# Patient Record
Sex: MALE | Race: WHITE | NOT HISPANIC OR LATINO | Employment: OTHER | URBAN - METROPOLITAN AREA
[De-identification: names, ages, dates, MRNs, and addresses within clinical notes are randomized per-mention and may not be internally consistent; named-entity substitution may affect disease eponyms.]

---

## 2017-10-30 ENCOUNTER — OFFICE VISIT (OUTPATIENT)
Dept: FAMILY MEDICINE CLINIC | Facility: CLINIC | Age: 77
End: 2017-10-30

## 2017-10-30 VITALS
RESPIRATION RATE: 16 BRPM | WEIGHT: 205 LBS | OXYGEN SATURATION: 98 % | TEMPERATURE: 98 F | HEIGHT: 72 IN | SYSTOLIC BLOOD PRESSURE: 162 MMHG | DIASTOLIC BLOOD PRESSURE: 76 MMHG | HEART RATE: 91 BPM | BODY MASS INDEX: 27.77 KG/M2

## 2017-10-30 DIAGNOSIS — E78.00 HYPERCHOLESTEROLEMIA: Primary | ICD-10-CM

## 2017-10-30 DIAGNOSIS — I10 ESSENTIAL HYPERTENSION: ICD-10-CM

## 2017-10-30 PROCEDURE — 99203 OFFICE O/P NEW LOW 30 MIN: CPT | Performed by: INTERNAL MEDICINE

## 2017-10-30 RX ORDER — POTASSIUM CHLORIDE 750 MG/1
10 TABLET, FILM COATED, EXTENDED RELEASE ORAL
Refills: 0 | COMMUNITY
Start: 2017-08-25 | End: 2017-10-30 | Stop reason: SDUPTHER

## 2017-10-30 RX ORDER — HYDROCHLOROTHIAZIDE 25 MG/1
25 TABLET ORAL 2 TIMES DAILY
Qty: 180 TABLET | Refills: 1 | Status: SHIPPED | OUTPATIENT
Start: 2017-10-30 | End: 2018-04-30 | Stop reason: SDUPTHER

## 2017-10-30 RX ORDER — METOPROLOL SUCCINATE 25 MG/1
25 TABLET, EXTENDED RELEASE ORAL DAILY
Refills: 0 | COMMUNITY
Start: 2017-07-31 | End: 2017-10-30 | Stop reason: SDUPTHER

## 2017-10-30 RX ORDER — ALLOPURINOL 100 MG/1
100 TABLET ORAL DAILY
Refills: 0 | COMMUNITY
Start: 2017-09-13 | End: 2017-10-30 | Stop reason: SDUPTHER

## 2017-10-30 RX ORDER — HYDROCHLOROTHIAZIDE 25 MG/1
25 TABLET ORAL 2 TIMES DAILY
Refills: 0 | COMMUNITY
Start: 2017-08-07 | End: 2017-10-30 | Stop reason: SDUPTHER

## 2017-10-30 RX ORDER — RAMIPRIL 5 MG/1
5 CAPSULE ORAL 2 TIMES DAILY
Refills: 0 | COMMUNITY
Start: 2017-09-13 | End: 2017-10-30 | Stop reason: SDUPTHER

## 2017-10-30 RX ORDER — ATORVASTATIN CALCIUM 10 MG/1
10 TABLET, FILM COATED ORAL DAILY
Qty: 90 TABLET | Refills: 1 | Status: SHIPPED | OUTPATIENT
Start: 2017-10-30 | End: 2018-05-29 | Stop reason: SDUPTHER

## 2017-10-30 RX ORDER — ATORVASTATIN CALCIUM 10 MG/1
10 TABLET, FILM COATED ORAL DAILY
Refills: 0 | COMMUNITY
Start: 2017-09-01 | End: 2017-10-30 | Stop reason: SDUPTHER

## 2017-10-30 RX ORDER — POTASSIUM CHLORIDE 750 MG/1
10 TABLET, FILM COATED, EXTENDED RELEASE ORAL 2 TIMES DAILY
Qty: 180 TABLET | Refills: 1 | Status: SHIPPED | OUTPATIENT
Start: 2017-10-30 | End: 2017-11-01 | Stop reason: SDUPTHER

## 2017-10-30 RX ORDER — METOPROLOL SUCCINATE 25 MG/1
25 TABLET, EXTENDED RELEASE ORAL DAILY
Qty: 90 TABLET | Refills: 1 | Status: SHIPPED | OUTPATIENT
Start: 2017-10-30 | End: 2018-04-24 | Stop reason: SDUPTHER

## 2017-10-30 RX ORDER — RAMIPRIL 5 MG/1
5 CAPSULE ORAL 2 TIMES DAILY
Qty: 180 CAPSULE | Refills: 1 | Status: SHIPPED | OUTPATIENT
Start: 2017-10-30 | End: 2018-07-03 | Stop reason: SDUPTHER

## 2017-10-30 RX ORDER — ALLOPURINOL 100 MG/1
100 TABLET ORAL DAILY
Qty: 90 TABLET | Refills: 1 | Status: SHIPPED | OUTPATIENT
Start: 2017-10-30 | End: 2018-07-03 | Stop reason: SDUPTHER

## 2017-10-30 RX ORDER — HYDRALAZINE HYDROCHLORIDE 25 MG/1
25 TABLET, FILM COATED ORAL DAILY
Qty: 180 TABLET | Refills: 0 | Status: SHIPPED | OUTPATIENT
Start: 2017-10-30 | End: 2018-02-06 | Stop reason: SDUPTHER

## 2017-10-30 RX ORDER — HYDRALAZINE HYDROCHLORIDE 25 MG/1
25 TABLET, FILM COATED ORAL DAILY
Refills: 0 | COMMUNITY
Start: 2017-09-06 | End: 2017-10-30 | Stop reason: SDUPTHER

## 2017-10-30 NOTE — PROGRESS NOTES
Subjective   Olu Izquierdo is a 77 y.o. male. Patient is here today for   Chief Complaint   Patient presents with   • Med Refill     patient needs refill on Metoprolol and HCTZ          Vitals:    10/30/17 1323   BP: 162/76   Pulse: 91   Resp: 16   Temp: 98 °F (36.7 °C)   SpO2: 98%       Past Medical History:   Diagnosis Date   • Arthritis    • Depression    • Hyperlipidemia    • Hypertension       No Known Allergies   Social History     Social History   • Marital status:      Spouse name: N/A   • Number of children: N/A   • Years of education: N/A     Occupational History   • Not on file.     Social History Main Topics   • Smoking status: Never Smoker   • Smokeless tobacco: Not on file   • Alcohol use Yes   • Drug use: Not on file   • Sexual activity: Not on file     Other Topics Concern   • Not on file     Social History Narrative   • No narrative on file        Current Outpatient Prescriptions:   •  allopurinol (ZYLOPRIM) 100 MG tablet, Take 1 tablet by mouth Daily., Disp: 90 tablet, Rfl: 1  •  atorvastatin (LIPITOR) 10 MG tablet, Take 1 tablet by mouth Daily., Disp: 90 tablet, Rfl: 1  •  hydrALAZINE (APRESOLINE) 25 MG tablet, Take 1 tablet by mouth Daily., Disp: 180 tablet, Rfl: 0  •  hydrochlorothiazide (HYDRODIURIL) 25 MG tablet, Take 1 tablet by mouth 2 (Two) Times a Day., Disp: 180 tablet, Rfl: 1  •  metoprolol succinate XL (TOPROL-XL) 25 MG 24 hr tablet, Take 1 tablet by mouth Daily., Disp: 90 tablet, Rfl: 1  •  potassium chloride (K-DUR) 10 MEQ CR tablet, Take 1 tablet by mouth 2 (Two) Times a Day. TAKE 3 TABLETS TWICE DAILY, Disp: 180 tablet, Rfl: 1  •  ramipril (ALTACE) 5 MG capsule, Take 1 capsule by mouth 2 (Two) Times a Day., Disp: 180 capsule, Rfl: 1     Objective     HPI Comments: He is here meet me today for the first time.  He saw Dr. Akins for many years until his recent death.       Review of Systems   Constitutional: Negative.    HENT: Negative.    Respiratory: Negative.     Cardiovascular: Negative.    Musculoskeletal: Negative.    Psychiatric/Behavioral: Negative.        Physical Exam   Constitutional: He is oriented to person, place, and time. He appears well-developed and well-nourished.   HENT:   Head: Normocephalic and atraumatic.   Cardiovascular: Normal rate and regular rhythm.    Pulmonary/Chest: Effort normal and breath sounds normal.   Neurological: He is alert and oriented to person, place, and time.   Psychiatric: He has a normal mood and affect. His behavior is normal.   Nursing note and vitals reviewed.        Problem List Items Addressed This Visit        Cardiovascular and Mediastinum    Essential hypertension    Relevant Medications    hydrALAZINE (APRESOLINE) 25 MG tablet    hydrochlorothiazide (HYDRODIURIL) 25 MG tablet    metoprolol succinate XL (TOPROL-XL) 25 MG 24 hr tablet    ramipril (ALTACE) 5 MG capsule    Hypercholesterolemia - Primary    Relevant Medications    atorvastatin (LIPITOR) 10 MG tablet            PLAN  His hypertension is well-controlled.    He is a lipid and comprehensive metabolic panel which we will get today.    Follow-up in 3 months or so.  About a week before that visit, I would like to get following labs: Lipid profile, comprehensive metabolic panel, CBC, urinalysis.  Return in about 3 months (around 1/30/2018) for with labs.

## 2017-11-01 RX ORDER — POTASSIUM CHLORIDE 750 MG/1
TABLET, FILM COATED, EXTENDED RELEASE ORAL
Qty: 540 TABLET | Refills: 1 | Status: SHIPPED | OUTPATIENT
Start: 2017-11-01 | End: 2018-05-29 | Stop reason: SDUPTHER

## 2017-12-27 ENCOUNTER — OFFICE VISIT (OUTPATIENT)
Dept: FAMILY MEDICINE CLINIC | Facility: CLINIC | Age: 77
End: 2017-12-27

## 2017-12-27 VITALS
DIASTOLIC BLOOD PRESSURE: 77 MMHG | HEART RATE: 91 BPM | HEIGHT: 72 IN | RESPIRATION RATE: 16 BRPM | WEIGHT: 207.2 LBS | SYSTOLIC BLOOD PRESSURE: 148 MMHG | BODY MASS INDEX: 28.06 KG/M2 | TEMPERATURE: 98.4 F | OXYGEN SATURATION: 98 %

## 2017-12-27 DIAGNOSIS — J06.9 ACUTE URI: Primary | ICD-10-CM

## 2017-12-27 PROCEDURE — 99213 OFFICE O/P EST LOW 20 MIN: CPT | Performed by: NURSE PRACTITIONER

## 2017-12-27 RX ORDER — PROMETHAZINE HYDROCHLORIDE AND CODEINE PHOSPHATE 6.25; 1 MG/5ML; MG/5ML
5 SYRUP ORAL EVERY 4 HOURS PRN
Qty: 180 ML | Refills: 0 | Status: SHIPPED | OUTPATIENT
Start: 2017-12-27 | End: 2018-02-06

## 2017-12-27 NOTE — PROGRESS NOTES
Subjective   Olu Izquierdo is a 77 y.o. male.   Chief Complaint   Patient presents with   • Cough     pt states has been going on 12/18/17   • Hoarse   • Nasal Congestion     Vitals:    12/27/17 1347   BP: 148/77   Pulse: 91   Resp: 16   Temp: 98.4 °F (36.9 °C)   SpO2: 98%     No LMP for male patient.    History of Present Illness  Olu is here for an acute visit. He c/o sore throat, cough, and nasal congestion for 3-4 days. He denies fever, chills or body aches. He did get his flu vaccine.     The following portions of the patient's history were reviewed and updated as appropriate: allergies, current medications, past family history, past medical history, past social history, past surgical history and problem list.    Review of Systems   Constitutional: Negative for chills, fatigue and fever.   HENT: Positive for congestion, postnasal drip, rhinorrhea and sore throat (mild ). Negative for ear pain, sinus pain and sinus pressure.    Respiratory: Positive for cough. Negative for shortness of breath and wheezing.    Cardiovascular: Negative.        Objective   Physical Exam   Constitutional: Vital signs are normal. He appears well-developed and well-nourished. No distress.   HENT:   Right Ear: Tympanic membrane and ear canal normal.   Left Ear: Tympanic membrane and ear canal normal.   Nose: Mucosal edema and rhinorrhea present.   Mouth/Throat: Uvula is midline. Posterior oropharyngeal erythema present.   Cardiovascular: Normal rate and regular rhythm.    Pulmonary/Chest: Effort normal and breath sounds normal.   Neurological: He is alert.       Assessment/Plan   Olu was seen today for cough, hoarse and nasal congestion.    Diagnoses and all orders for this visit:    Acute URI    Other orders  -     promethazine-codeine (PHENERGAN with CODEINE) 6.25-10 MG/5ML syrup; Take 5 mL by mouth Every 4 (Four) Hours As Needed for Cough.      claritin or flonase otc for post nasal drainage  Rest and fluids  Tylenol or  motrin  If no improvement with symptom treatment for 7-10 days, advised to call and will call in abx  If there are new symptoms or he is worse he needs to follow up in the office

## 2018-01-08 ENCOUNTER — OFFICE VISIT (OUTPATIENT)
Dept: FAMILY MEDICINE CLINIC | Facility: CLINIC | Age: 78
End: 2018-01-08

## 2018-01-08 VITALS
BODY MASS INDEX: 28.06 KG/M2 | HEART RATE: 91 BPM | TEMPERATURE: 98 F | HEIGHT: 72 IN | DIASTOLIC BLOOD PRESSURE: 64 MMHG | WEIGHT: 207.2 LBS | OXYGEN SATURATION: 98 % | SYSTOLIC BLOOD PRESSURE: 118 MMHG

## 2018-01-08 DIAGNOSIS — N41.0 ACUTE PROSTATITIS: Primary | ICD-10-CM

## 2018-01-08 PROCEDURE — 99213 OFFICE O/P EST LOW 20 MIN: CPT | Performed by: INTERNAL MEDICINE

## 2018-01-08 RX ORDER — AMOXICILLIN AND CLAVULANATE POTASSIUM 875; 125 MG/1; MG/1
TABLET, FILM COATED ORAL
Refills: 0 | COMMUNITY
Start: 2017-12-29 | End: 2018-02-06

## 2018-01-08 NOTE — PROGRESS NOTES
Subjective   Olu Izquierdo is a 77 y.o. male. Patient is here today for   Chief Complaint   Patient presents with   • Benign Prostatic Hypertrophy     went to urgent care on 1/6/18          Vitals:    01/08/18 1019   BP: 118/64   Pulse: 91   Temp: 98 °F (36.7 °C)   SpO2: 98%       Past Medical History:   Diagnosis Date   • Arthritis    • Depression    • Hyperlipidemia    • Hypertension       No Known Allergies   Social History     Social History   • Marital status:      Spouse name: N/A   • Number of children: N/A   • Years of education: N/A     Occupational History   • Not on file.     Social History Main Topics   • Smoking status: Never Smoker   • Smokeless tobacco: Never Used   • Alcohol use Yes   • Drug use: Not on file   • Sexual activity: Not on file     Other Topics Concern   • Not on file     Social History Narrative        Current Outpatient Prescriptions:   •  allopurinol (ZYLOPRIM) 100 MG tablet, Take 1 tablet by mouth Daily., Disp: 90 tablet, Rfl: 1  •  amoxicillin-clavulanate (AUGMENTIN) 875-125 MG per tablet, TK 1 T PO BID FOR 10 DAYS, Disp: , Rfl: 0  •  atorvastatin (LIPITOR) 10 MG tablet, Take 1 tablet by mouth Daily., Disp: 90 tablet, Rfl: 1  •  hydrALAZINE (APRESOLINE) 25 MG tablet, Take 1 tablet by mouth Daily., Disp: 180 tablet, Rfl: 0  •  hydrochlorothiazide (HYDRODIURIL) 25 MG tablet, Take 1 tablet by mouth 2 (Two) Times a Day., Disp: 180 tablet, Rfl: 1  •  metoprolol succinate XL (TOPROL-XL) 25 MG 24 hr tablet, Take 1 tablet by mouth Daily., Disp: 90 tablet, Rfl: 1  •  potassium chloride (K-DUR) 10 MEQ CR tablet, TAKE 3 TABLETS BY MOUTH TWICE DAILY, Disp: 540 tablet, Rfl: 1  •  promethazine-codeine (PHENERGAN with CODEINE) 6.25-10 MG/5ML syrup, Take 5 mL by mouth Every 4 (Four) Hours As Needed for Cough., Disp: 180 mL, Rfl: 0  •  ramipril (ALTACE) 5 MG capsule, Take 1 capsule by mouth 2 (Two) Times a Day., Disp: 180 capsule, Rfl: 1  •  sulfamethoxazole-trimethoprim (BACTRIM DS,SEPTRA DS)  800-160 MG per tablet, Take 1 tablet by mouth 2 (Two) Times a Day., Disp: 28 tablet, Rfl: 0  •  tamsulosin (FLOMAX) 0.4 MG capsule 24 hr capsule, Take 1 capsule by mouth Every Night., Disp: 30 capsule, Rfl: 0     Objective     HPI Comments: He went to the urgent treatment Center 2 days ago.  He is found to have a tender prostate.  He was treated for prostatitis with tamsulosin 0.4 mg and Bactrim DS.  He said he felt better almost immediately after starting the antibiotic.    He is following up today as he was asked to by the urgent treatment physician.    He tells me his symptoms of urinary obstruction have resolved.  She was having some burning in the tip of his penis at the end of micturition but this has gone away as well.    Benign Prostatic Hypertrophy          Review of Systems   Constitutional: Negative.    Genitourinary: Negative.        Physical Exam   Constitutional: He is oriented to person, place, and time. He appears well-developed and well-nourished.   HENT:   Head: Normocephalic and atraumatic.   Pulmonary/Chest: Effort normal.   Neurological: He is alert and oriented to person, place, and time.   Psychiatric: He has a normal mood and affect. His behavior is normal.   Nursing note and vitals reviewed.        Problem List Items Addressed This Visit        Genitourinary    Acute prostatitis - Primary            PLAN  He appears to be getting better with the appropriate antibiotic selection of the urgent treatment physician.  He will continue a total of 14 days Bactrim 1 by mouth twice a day.    He will continue tamsulosin 0.4 mg.    He will follow-up as previously arranged.  No Follow-up on file.

## 2018-01-30 DIAGNOSIS — Z79.899 LONG TERM USE OF DRUG: Primary | ICD-10-CM

## 2018-01-30 DIAGNOSIS — E78.5 HYPERLIPIDEMIA, UNSPECIFIED HYPERLIPIDEMIA TYPE: ICD-10-CM

## 2018-01-30 DIAGNOSIS — Z12.5 ENCOUNTER FOR SCREENING FOR MALIGNANT NEOPLASM OF PROSTATE: ICD-10-CM

## 2018-01-30 LAB
ALBUMIN SERPL-MCNC: 4.3 G/DL (ref 3.5–5.2)
ALBUMIN/GLOB SERPL: 1.7 G/DL
ALP SERPL-CCNC: 75 U/L (ref 39–117)
ALT SERPL-CCNC: 31 U/L (ref 1–41)
AST SERPL-CCNC: 24 U/L (ref 1–40)
BILIRUB SERPL-MCNC: 0.5 MG/DL (ref 0.1–1.2)
BUN SERPL-MCNC: 19 MG/DL (ref 8–23)
BUN/CREAT SERPL: 16.4 (ref 7–25)
CALCIUM SERPL-MCNC: 9.1 MG/DL (ref 8.6–10.5)
CHLORIDE SERPL-SCNC: 100 MMOL/L (ref 98–107)
CHOLEST SERPL-MCNC: 151 MG/DL (ref 0–200)
CO2 SERPL-SCNC: 32.6 MMOL/L (ref 22–29)
CREAT SERPL-MCNC: 1.16 MG/DL (ref 0.76–1.27)
GFR SERPLBLD CREATININE-BSD FMLA CKD-EPI: 61 ML/MIN/1.73
GFR SERPLBLD CREATININE-BSD FMLA CKD-EPI: 74 ML/MIN/1.73
GLOBULIN SER CALC-MCNC: 2.5 GM/DL
GLUCOSE SERPL-MCNC: 104 MG/DL (ref 65–99)
HDLC SERPL-MCNC: 58 MG/DL (ref 40–60)
LDLC SERPL CALC-MCNC: 76 MG/DL (ref 0–100)
LDLC/HDLC SERPL: 1.31 {RATIO}
POTASSIUM SERPL-SCNC: 4 MMOL/L (ref 3.5–5.2)
PROT SERPL-MCNC: 6.8 G/DL (ref 6–8.5)
PSA SERPL-MCNC: 1.9 NG/ML (ref 0–4)
SODIUM SERPL-SCNC: 140 MMOL/L (ref 136–145)
TRIGL SERPL-MCNC: 86 MG/DL (ref 0–150)
VLDLC SERPL CALC-MCNC: 17.2 MG/DL (ref 5–40)

## 2018-02-06 ENCOUNTER — OFFICE VISIT (OUTPATIENT)
Dept: FAMILY MEDICINE CLINIC | Facility: CLINIC | Age: 78
End: 2018-02-06

## 2018-02-06 VITALS
OXYGEN SATURATION: 99 % | TEMPERATURE: 97.8 F | BODY MASS INDEX: 28.42 KG/M2 | DIASTOLIC BLOOD PRESSURE: 63 MMHG | WEIGHT: 209.8 LBS | SYSTOLIC BLOOD PRESSURE: 134 MMHG | RESPIRATION RATE: 16 BRPM | HEART RATE: 78 BPM | HEIGHT: 72 IN

## 2018-02-06 DIAGNOSIS — E78.00 HYPERCHOLESTEROLEMIA: Primary | ICD-10-CM

## 2018-02-06 DIAGNOSIS — I10 ESSENTIAL HYPERTENSION: ICD-10-CM

## 2018-02-06 PROCEDURE — 99213 OFFICE O/P EST LOW 20 MIN: CPT | Performed by: INTERNAL MEDICINE

## 2018-02-06 RX ORDER — HYDRALAZINE HYDROCHLORIDE 25 MG/1
25 TABLET, FILM COATED ORAL DAILY
Qty: 180 TABLET | Refills: 0 | Status: SHIPPED | OUTPATIENT
Start: 2018-02-06 | End: 2018-06-07 | Stop reason: SDUPTHER

## 2018-02-06 NOTE — PROGRESS NOTES
Subjective   Olu Izquierdo is a 77 y.o. male. Patient is here today for   Chief Complaint   Patient presents with   • Follow-up     hypertension          Vitals:    02/06/18 0857   BP: 134/63   Pulse: 78   Resp: 16   Temp: 97.8 °F (36.6 °C)   SpO2: 99%       Past Medical History:   Diagnosis Date   • Arthritis    • Depression    • Hyperlipidemia    • Hypertension       No Known Allergies   Social History     Social History   • Marital status:      Spouse name: N/A   • Number of children: N/A   • Years of education: N/A     Occupational History   • Not on file.     Social History Main Topics   • Smoking status: Never Smoker   • Smokeless tobacco: Never Used   • Alcohol use Yes   • Drug use: Not on file   • Sexual activity: Not on file     Other Topics Concern   • Not on file     Social History Narrative        Current Outpatient Prescriptions:   •  allopurinol (ZYLOPRIM) 100 MG tablet, Take 1 tablet by mouth Daily., Disp: 90 tablet, Rfl: 1  •  atorvastatin (LIPITOR) 10 MG tablet, Take 1 tablet by mouth Daily., Disp: 90 tablet, Rfl: 1  •  hydrALAZINE (APRESOLINE) 25 MG tablet, Take 1 tablet by mouth Daily., Disp: 180 tablet, Rfl: 0  •  hydrochlorothiazide (HYDRODIURIL) 25 MG tablet, Take 1 tablet by mouth 2 (Two) Times a Day., Disp: 180 tablet, Rfl: 1  •  metoprolol succinate XL (TOPROL-XL) 25 MG 24 hr tablet, Take 1 tablet by mouth Daily., Disp: 90 tablet, Rfl: 1  •  potassium chloride (K-DUR) 10 MEQ CR tablet, TAKE 3 TABLETS BY MOUTH TWICE DAILY, Disp: 540 tablet, Rfl: 1  •  ramipril (ALTACE) 5 MG capsule, Take 1 capsule by mouth 2 (Two) Times a Day., Disp: 180 capsule, Rfl: 1  •  tamsulosin (FLOMAX) 0.4 MG capsule 24 hr capsule, Take 1 capsule by mouth Every Night., Disp: 30 capsule, Rfl: 0     Objective     HPI Comments: He is here to follow-up on hypertension.    He tells me that he feels well.       Review of Systems   Constitutional: Negative.    HENT: Negative.    Respiratory: Negative.     Cardiovascular: Negative.    Psychiatric/Behavioral: Negative.        Physical Exam   Constitutional: He is oriented to person, place, and time.   Pleasant, neatly groomed, BMI 28.   HENT:   Head: Normocephalic and atraumatic.   Pulmonary/Chest: Effort normal.   Neurological: He is alert and oriented to person, place, and time.   Psychiatric: He has a normal mood and affect. His behavior is normal.   Nursing note and vitals reviewed.        Problem List Items Addressed This Visit        Cardiovascular and Mediastinum    Essential hypertension    Relevant Medications    hydrALAZINE (APRESOLINE) 25 MG tablet    Hypercholesterolemia - Primary            PLAN  His hypertension is well-controlled.    His hypercholesterolemia is well-controlled with an LDL of 76.    I asked him to follow-up in about 6 months to recheck a lipid and comprehensive metabolic panel.    He should follow-up for Medicare wellness visit once yearly.    I reminded him to get some vascular screening.  No Follow-up on file.

## 2018-04-24 RX ORDER — METOPROLOL SUCCINATE 25 MG/1
25 TABLET, EXTENDED RELEASE ORAL DAILY
Qty: 90 TABLET | Refills: 0 | Status: SHIPPED | OUTPATIENT
Start: 2018-04-24 | End: 2018-07-24 | Stop reason: SDUPTHER

## 2018-04-30 RX ORDER — HYDROCHLOROTHIAZIDE 25 MG/1
TABLET ORAL
Qty: 180 TABLET | Refills: 0 | Status: SHIPPED | OUTPATIENT
Start: 2018-04-30 | End: 2018-08-07 | Stop reason: SDUPTHER

## 2018-05-29 RX ORDER — ATORVASTATIN CALCIUM 10 MG/1
10 TABLET, FILM COATED ORAL DAILY
Qty: 90 TABLET | Refills: 0 | Status: SHIPPED | OUTPATIENT
Start: 2018-05-29 | End: 2018-08-22 | Stop reason: SDUPTHER

## 2018-05-29 RX ORDER — POTASSIUM CHLORIDE 750 MG/1
TABLET, FILM COATED, EXTENDED RELEASE ORAL
Qty: 540 TABLET | Refills: 0 | Status: SHIPPED | OUTPATIENT
Start: 2018-05-29 | End: 2018-08-22 | Stop reason: SDUPTHER

## 2018-06-07 RX ORDER — HYDRALAZINE HYDROCHLORIDE 25 MG/1
TABLET, FILM COATED ORAL
Qty: 180 TABLET | Refills: 0 | Status: SHIPPED | OUTPATIENT
Start: 2018-06-07 | End: 2018-08-22 | Stop reason: SDUPTHER

## 2018-06-14 ENCOUNTER — TELEPHONE (OUTPATIENT)
Dept: FAMILY MEDICINE CLINIC | Facility: CLINIC | Age: 78
End: 2018-06-14

## 2018-06-14 NOTE — TELEPHONE ENCOUNTER
Patient would like to become your patient. He sees Dr. Moore but would like pcp closer home.   Just let me know thanks.

## 2018-07-03 RX ORDER — ALLOPURINOL 100 MG/1
100 TABLET ORAL DAILY
Qty: 90 TABLET | Refills: 0 | Status: SHIPPED | OUTPATIENT
Start: 2018-07-03 | End: 2018-08-22

## 2018-07-03 RX ORDER — RAMIPRIL 5 MG/1
CAPSULE ORAL
Qty: 180 CAPSULE | Refills: 0 | Status: SHIPPED | OUTPATIENT
Start: 2018-07-03 | End: 2018-08-22 | Stop reason: SDUPTHER

## 2018-07-24 RX ORDER — METOPROLOL SUCCINATE 25 MG/1
25 TABLET, EXTENDED RELEASE ORAL DAILY
Qty: 90 TABLET | Refills: 0 | Status: SHIPPED | OUTPATIENT
Start: 2018-07-24 | End: 2018-08-22 | Stop reason: SDUPTHER

## 2018-08-07 RX ORDER — HYDROCHLOROTHIAZIDE 25 MG/1
TABLET ORAL
Qty: 180 TABLET | Refills: 0 | Status: SHIPPED | OUTPATIENT
Start: 2018-08-07 | End: 2018-08-22 | Stop reason: SDUPTHER

## 2018-08-22 ENCOUNTER — OFFICE VISIT (OUTPATIENT)
Dept: FAMILY MEDICINE CLINIC | Facility: CLINIC | Age: 78
End: 2018-08-22

## 2018-08-22 VITALS
DIASTOLIC BLOOD PRESSURE: 79 MMHG | HEIGHT: 72 IN | WEIGHT: 197.8 LBS | OXYGEN SATURATION: 99 % | SYSTOLIC BLOOD PRESSURE: 140 MMHG | BODY MASS INDEX: 26.79 KG/M2 | TEMPERATURE: 97 F | HEART RATE: 72 BPM

## 2018-08-22 DIAGNOSIS — I10 ESSENTIAL HYPERTENSION: Primary | ICD-10-CM

## 2018-08-22 DIAGNOSIS — E87.6 HYPOKALEMIA: ICD-10-CM

## 2018-08-22 DIAGNOSIS — E78.00 HYPERCHOLESTEROLEMIA: ICD-10-CM

## 2018-08-22 DIAGNOSIS — M10.9 GOUT, UNSPECIFIED CAUSE, UNSPECIFIED CHRONICITY, UNSPECIFIED SITE: ICD-10-CM

## 2018-08-22 PROBLEM — N41.0 ACUTE PROSTATITIS: Status: RESOLVED | Noted: 2018-01-08 | Resolved: 2018-08-22

## 2018-08-22 PROCEDURE — 99214 OFFICE O/P EST MOD 30 MIN: CPT | Performed by: FAMILY MEDICINE

## 2018-08-22 RX ORDER — POTASSIUM CHLORIDE 750 MG/1
30 TABLET, FILM COATED, EXTENDED RELEASE ORAL 2 TIMES DAILY
Qty: 540 TABLET | Refills: 1 | Status: SHIPPED | OUTPATIENT
Start: 2018-08-22 | End: 2019-02-25 | Stop reason: SDUPTHER

## 2018-08-22 RX ORDER — HYDRALAZINE HYDROCHLORIDE 25 MG/1
25 TABLET, FILM COATED ORAL 2 TIMES DAILY
Qty: 180 TABLET | Refills: 1 | Status: SHIPPED | OUTPATIENT
Start: 2018-08-22 | End: 2019-03-04 | Stop reason: SDUPTHER

## 2018-08-22 RX ORDER — ATORVASTATIN CALCIUM 10 MG/1
10 TABLET, FILM COATED ORAL DAILY
Qty: 90 TABLET | Refills: 1 | Status: SHIPPED | OUTPATIENT
Start: 2018-08-22 | End: 2019-02-21 | Stop reason: SDUPTHER

## 2018-08-22 RX ORDER — HYDROCHLOROTHIAZIDE 25 MG/1
25 TABLET ORAL 2 TIMES DAILY
Qty: 180 TABLET | Refills: 1 | Status: SHIPPED | OUTPATIENT
Start: 2018-08-22 | End: 2019-05-10 | Stop reason: SDUPTHER

## 2018-08-22 RX ORDER — ALLOPURINOL 100 MG/1
100 TABLET ORAL DAILY
Qty: 90 TABLET | Refills: 1 | Status: CANCELLED | OUTPATIENT
Start: 2018-08-22

## 2018-08-22 RX ORDER — METOPROLOL SUCCINATE 25 MG/1
25 TABLET, EXTENDED RELEASE ORAL DAILY
Qty: 90 TABLET | Refills: 0 | Status: SHIPPED | OUTPATIENT
Start: 2018-08-22 | End: 2019-01-21 | Stop reason: SDUPTHER

## 2018-08-22 RX ORDER — RAMIPRIL 5 MG/1
5 CAPSULE ORAL 2 TIMES DAILY
Qty: 180 CAPSULE | Refills: 1 | Status: SHIPPED | OUTPATIENT
Start: 2018-08-22 | End: 2019-04-02 | Stop reason: SDUPTHER

## 2018-08-22 NOTE — PROGRESS NOTES
"Aba Izquierdo is a 77 y.o. male.     Chief Complaint   Patient presents with   • Establish Care     Transfer Dr. Moore    • Hypertension   • Hyperlipidemia   • Arthritis        History of Present Illness    New patient.  Long-standing hypertension.  On a interesting combination of medication including hydralazine, hydrochlorothiazide 50 mg a day, metoprolol, Altace 5 mg twice a day, and a high dose of potassium chloride 60 mEq daily.  He states in the distant past, prior to the hydrochlorothiazide, he is to pass out with low potassium levels.  His blood pressures remained high at times.  He does not check at home.  He feels great otherwise.  No cardiovascular or neurological symptoms.  No rashes.  No synovitis.  Potassium only 4.0 a few months ago with normal creatinine.    Hyperlipidemia.  Continues on 10 mg of atorvastatin daily without complaint.  Lipid panel a few months ago normal.    History of questionable gout.  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  He's never had classic gout symptoms.  He's been on allopurinol 100 mg a day for about 20 years.      The following portions of the patient's history were reviewed and updated as appropriate: allergies, current medications, past family history, past medical history, past social history, past surgical history and problem list.          Review of Systems   Constitutional: Negative.    Respiratory: Negative.    Cardiovascular: Negative.    Musculoskeletal: Negative.    Neurological: Negative.    Psychiatric/Behavioral: Negative.        Objective   Blood pressure 140/79, pulse 72, temperature 97 °F (36.1 °C), temperature source Oral, height 182.9 cm (72.01\"), weight 89.7 kg (197 lb 12.8 oz), SpO2 99 %.  Physical Exam   Constitutional: He appears well-developed and well-nourished. No distress.   Neck: No thyromegaly present.   Cardiovascular: Normal rate, regular rhythm, normal heart sounds and intact distal pulses.    Pulmonary/Chest: " Effort normal and breath sounds normal.   Abdominal: Soft. Bowel sounds are normal. He exhibits no distension and no mass. There is no tenderness. There is no guarding.   No renal bruits   Musculoskeletal: He exhibits no edema.   Skin: Skin is warm and dry.   Psychiatric: He has a normal mood and affect. His behavior is normal. Judgment and thought content normal.   Nursing note and vitals reviewed.      Assessment/Plan   Olu was seen today for establish care, hypertension, hyperlipidemia and arthritis.    Diagnoses and all orders for this visit:    Essential hypertension  -     Comprehensive Metabolic Panel  -     Aldosterone / Renin Ratio    Gout, unspecified cause, unspecified chronicity, unspecified site    Hypercholesterolemia    Hypokalemia  -     Comprehensive Metabolic Panel  -     Aldosterone / Renin Ratio    Other orders  -     potassium chloride (K-DUR) 10 MEQ CR tablet; Take 3 tablets by mouth 2 (Two) Times a Day.  -     ramipril (ALTACE) 5 MG capsule; Take 1 capsule by mouth 2 (Two) Times a Day.  -     Cancel: allopurinol (ZYLOPRIM) 100 MG tablet; Take 1 tablet by mouth Daily.  -     atorvastatin (LIPITOR) 10 MG tablet; Take 1 tablet by mouth Daily.  -     hydrALAZINE (APRESOLINE) 25 MG tablet; Take 1 tablet by mouth 2 (Two) Times a Day.  -     hydrochlorothiazide (HYDRODIURIL) 25 MG tablet; Take 1 tablet by mouth 2 (Two) Times a Day.  -     metoprolol succinate XL (TOPROL-XL) 25 MG 24 hr tablet; Take 1 tablet by mouth Daily.      Hypertension.  History of hypokalemia.  Blood pressure not optimally controlled.  He is on a very high dose of potassium supplement along with his ACE inhibitor 10 mg a day of Altace and the relatively high dose of hydrochlorothiazide 50 mg a day.  However he states he had hypokalemia prior to hydrochlorothiazide use in the past.  Suspicious for possible hyperaldosteronism.  Rechecking CMP along with a aldosterone renin ratio.  He may need adjustment of medication.  He's  going to be checking blood pressures at home and sending us results.  I'll see him within 3 months for follow-up, sooner pending test results.  Also Medicare wellness visit next visit.    Hyperlipidemia.  Continue atorvastatin.    Previous history of possible gout.  Upon further history does not sound like gout.  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  I recommend stopping the 100 mg of allopurinol daily.  If he has gout-like symptoms will further investigate.  And treat.

## 2018-08-27 LAB
ALBUMIN SERPL-MCNC: 4.9 G/DL (ref 3.5–5.2)
ALBUMIN/GLOB SERPL: 2.2 G/DL
ALDOST SERPL-MCNC: 10.4 NG/DL (ref 0–30)
ALDOST/RENIN PLAS-RTO: 3.1 {RATIO} (ref 0–30)
ALP SERPL-CCNC: 82 U/L (ref 39–117)
ALT SERPL-CCNC: 21 U/L (ref 1–41)
AST SERPL-CCNC: 18 U/L (ref 1–40)
BILIRUB SERPL-MCNC: 0.4 MG/DL (ref 0.1–1.2)
BUN SERPL-MCNC: 19 MG/DL (ref 8–23)
BUN/CREAT SERPL: 14.8 (ref 7–25)
CALCIUM SERPL-MCNC: 9.4 MG/DL (ref 8.6–10.5)
CHLORIDE SERPL-SCNC: 102 MMOL/L (ref 98–107)
CO2 SERPL-SCNC: 28.7 MMOL/L (ref 22–29)
CREAT SERPL-MCNC: 1.28 MG/DL (ref 0.76–1.27)
GLOBULIN SER CALC-MCNC: 2.2 GM/DL
GLUCOSE SERPL-MCNC: 99 MG/DL (ref 65–99)
POTASSIUM SERPL-SCNC: 4.2 MMOL/L (ref 3.5–5.2)
PROT SERPL-MCNC: 7.1 G/DL (ref 6–8.5)
RENIN PLAS-CCNC: 3.31 NG/ML/HR (ref 0.17–5.38)
SODIUM SERPL-SCNC: 142 MMOL/L (ref 136–145)

## 2018-08-27 NOTE — PROGRESS NOTES
The lab work overall normal.  The aldosterone renin level was normal.  No cause of hypertension found.  Continue current therapy.  Need CMP and lipid panel prior to next visit.

## 2018-11-13 DIAGNOSIS — E78.00 HYPERCHOLESTEROLEMIA: ICD-10-CM

## 2018-11-13 DIAGNOSIS — I10 ESSENTIAL HYPERTENSION: Primary | ICD-10-CM

## 2018-11-13 LAB
ALBUMIN SERPL-MCNC: 4.4 G/DL (ref 3.5–5.2)
ALBUMIN/GLOB SERPL: 1.8 G/DL
ALP SERPL-CCNC: 79 U/L (ref 39–117)
ALT SERPL-CCNC: 14 U/L (ref 1–41)
AST SERPL-CCNC: 15 U/L (ref 1–40)
BILIRUB SERPL-MCNC: 0.6 MG/DL (ref 0.1–1.2)
BUN SERPL-MCNC: 20 MG/DL (ref 8–23)
BUN/CREAT SERPL: 17.2 (ref 7–25)
CALCIUM SERPL-MCNC: 9.5 MG/DL (ref 8.6–10.5)
CHLORIDE SERPL-SCNC: 101 MMOL/L (ref 98–107)
CHOLEST SERPL-MCNC: 146 MG/DL (ref 0–200)
CO2 SERPL-SCNC: 29.9 MMOL/L (ref 22–29)
CREAT SERPL-MCNC: 1.16 MG/DL (ref 0.76–1.27)
GLOBULIN SER CALC-MCNC: 2.4 GM/DL
GLUCOSE SERPL-MCNC: 103 MG/DL (ref 65–99)
HDLC SERPL-MCNC: 64 MG/DL (ref 40–60)
LDLC SERPL CALC-MCNC: 71 MG/DL (ref 0–100)
POTASSIUM SERPL-SCNC: 4.3 MMOL/L (ref 3.5–5.2)
PROT SERPL-MCNC: 6.8 G/DL (ref 6–8.5)
SODIUM SERPL-SCNC: 142 MMOL/L (ref 136–145)
TRIGL SERPL-MCNC: 57 MG/DL (ref 0–150)
VLDLC SERPL CALC-MCNC: 11.4 MG/DL (ref 5–40)

## 2018-11-20 ENCOUNTER — OFFICE VISIT (OUTPATIENT)
Dept: FAMILY MEDICINE CLINIC | Facility: CLINIC | Age: 78
End: 2018-11-20

## 2018-11-20 VITALS
DIASTOLIC BLOOD PRESSURE: 78 MMHG | TEMPERATURE: 97.1 F | OXYGEN SATURATION: 98 % | SYSTOLIC BLOOD PRESSURE: 140 MMHG | WEIGHT: 199.1 LBS | BODY MASS INDEX: 26.97 KG/M2 | HEART RATE: 83 BPM | HEIGHT: 72 IN

## 2018-11-20 DIAGNOSIS — I10 ESSENTIAL HYPERTENSION: ICD-10-CM

## 2018-11-20 DIAGNOSIS — M54.41 ACUTE BILATERAL LOW BACK PAIN WITH BILATERAL SCIATICA: ICD-10-CM

## 2018-11-20 DIAGNOSIS — E87.6 HYPOKALEMIA: ICD-10-CM

## 2018-11-20 DIAGNOSIS — M54.42 ACUTE BILATERAL LOW BACK PAIN WITH BILATERAL SCIATICA: ICD-10-CM

## 2018-11-20 DIAGNOSIS — E78.00 HYPERCHOLESTEROLEMIA: ICD-10-CM

## 2018-11-20 DIAGNOSIS — Z00.00 MEDICARE ANNUAL WELLNESS VISIT, SUBSEQUENT: Primary | ICD-10-CM

## 2018-11-20 PROCEDURE — 90670 PCV13 VACCINE IM: CPT | Performed by: FAMILY MEDICINE

## 2018-11-20 PROCEDURE — G0439 PPPS, SUBSEQ VISIT: HCPCS | Performed by: FAMILY MEDICINE

## 2018-11-20 PROCEDURE — G0009 ADMIN PNEUMOCOCCAL VACCINE: HCPCS | Performed by: FAMILY MEDICINE

## 2018-11-20 PROCEDURE — 99214 OFFICE O/P EST MOD 30 MIN: CPT | Performed by: FAMILY MEDICINE

## 2018-11-20 NOTE — PROGRESS NOTES
Subjective   Olu Izquierdo is a 78 y.o. male.     Annual Exam (AWV follow up labs) and Leg Pain (bilateral, siatica pain x 5 wks )    History of Present Illness    Hypertension follow up. Doing well with current medication which he is taking as directed. No known high or low blood pressure episodes. No cardiovascular or neurological symptoms. Today's BP: 140/78.  Repeat blood pressure was 140/78.  He checks his blood pressure at home regularly.  Runs normal.  He's been on high-dose potassium supplementation for about 10 years and the medication has been unchanged.  His aldosterone renin levels are normal.  He has history of hypokalemia but his lab work in recent weeks was normal.  He feels good.    Hyperlipidemia follow up. He is taking statin medication without complaint. No myopathy symptoms.     Last lipid panel:   Lab Results   Component Value Date    HDL 64 (H) 11/13/2018     Lab Results   Component Value Date    LDL 71 11/13/2018     Lab Results   Component Value Date    TRIG 57 11/13/2018     A few weeks of low back pain.  Shooting sciatica pain to the bilateral knees.  He's had this before but it's been a number of years.  He'll start with some numbness at the left anterior shin.  He's had no injury.  No change in urination.  No weakness.  No numbness otherwise.  Feels good.  Pain is not currently bothering him.  Mild to moderate symptoms.  Takes Tylenol.    The following portions of the patient's history were reviewed and updated as appropriate: allergies, current medications, past family history, past medical history, past social history, past surgical history and problem list.      Review of Systems   Constitutional: Negative.  Negative for fatigue and fever.   Respiratory: Negative.    Cardiovascular: Negative.    Gastrointestinal: Negative.    Genitourinary: Negative.    Musculoskeletal: Positive for back pain.   Neurological: Negative for weakness and numbness.   Psychiatric/Behavioral: Negative.   "      Objective   Blood pressure 140/78, pulse 83, temperature 97.1 °F (36.2 °C), temperature source Oral, height 182.9 cm (72.01\"), weight 90.3 kg (199 lb 1.6 oz), SpO2 98 %.  Physical Exam   Constitutional: He appears well-developed and well-nourished. No distress.   Neck: No thyromegaly present.   Cardiovascular: Normal rate, regular rhythm, normal heart sounds and intact distal pulses.   Pulmonary/Chest: Effort normal and breath sounds normal.   Musculoskeletal: He exhibits no edema.   Lumbar spine with good range of motion.  No bony pain to palpation.  No SI joint pain to palpation.  Negative straight leg lift bilaterally.  Strength is 5 out of 5 all major muscular lower extremity is.  DTRs are +2 over 4 bilateral patella.  1 over 4 bilateral Achilles.  Gait unremarkable.   Skin: Skin is warm and dry.   Psychiatric: He has a normal mood and affect. His behavior is normal. Judgment and thought content normal.   Nursing note and vitals reviewed.      Assessment/Plan   Olu was seen today for annual exam and leg pain.    Diagnoses and all orders for this visit:    Medicare annual wellness visit, subsequent    Essential hypertension    Hypercholesterolemia    Hypokalemia    Acute bilateral low back pain with bilateral sciatica  -     Ambulatory Referral to Physical Therapy Evaluate and treat    Other orders  -     Pneumococcal Conjugate Vaccine 13-Valent All    Hypertension.  Previous hypokalemia, resolved.  Continue current medication including high-dose potassium supplementation.  The aldosterone renin level was normal.  We will continue to monitor his lab work.    Hyperlipidemia.  Stable.    Acute low back pain.  Bilateral sciatica.  He's had this in the distant past.  This time no absolute indication for imaging or other workup.  If getting much worse in the next few weeks I would recommend an MRI.  In the meantime I recommend Tylenol and physical therapy consultation and treatment.    Follow-up in 6 months " for recheck.  Sooner as needed as above.

## 2018-11-20 NOTE — PROGRESS NOTES
QUICK REFERENCE INFORMATION:  The ABCs of the Annual Wellness Visit    Subsequent Medicare Wellness Visit    HEALTH RISK ASSESSMENT    1940    Recent Hospitalizations:  No hospitalization(s) within the last year..        Current Medical Providers:  Patient Care Team:  Edenilson Henry MD as PCP - General (Family Medicine)        Smoking Status:  Social History     Tobacco Use   Smoking Status Never Smoker   Smokeless Tobacco Never Used       Alcohol Consumption:  Social History     Substance and Sexual Activity   Alcohol Use Yes    Comment: 1 a night        Depression Screen:   PHQ-2/PHQ-9 Depression Screening 11/20/2018   Little interest or pleasure in doing things 0   Feeling down, depressed, or hopeless 0   Trouble falling or staying asleep, or sleeping too much -   Feeling tired or having little energy -   Poor appetite or overeating -   Feeling bad about yourself - or that you are a failure or have let yourself or your family down -   Trouble concentrating on things, such as reading the newspaper or watching television -   Moving or speaking so slowly that other people could have noticed. Or the opposite - being so fidgety or restless that you have been moving around a lot more than usual -   Thoughts that you would be better off dead, or of hurting yourself in some way -   Total Score 0   If you checked off any problems, how difficult have these problems made it for you to do your work, take care of things at home, or get along with other people? -       Health Habits and Functional and Cognitive Screening:  Functional & Cognitive Status 11/20/2018   Do you have difficulty preparing food and eating? No   Do you have difficulty bathing yourself, getting dressed or grooming yourself? No   Do you have difficulty using the toilet? No   Do you have difficulty moving around from place to place? No   Do you have trouble with steps or getting out of a bed or a chair? No   In the past year have you fallen or  experienced a near fall? No   Current Diet Well Balanced Diet   Dental Exam Up to date   Eye Exam Not up to date   Exercise (times per week) 6 times per week   Current Exercise Activities Include Walking   Do you need help using the phone?  No   Are you deaf or do you have serious difficulty hearing?  No   Do you need help with transportation? No   Do you need help shopping? No   Do you need help preparing meals?  No   Do you need help with housework?  No   Do you need help with laundry? No   Do you need help taking your medications? No   Do you need help managing money? No   Do you ever drive or ride in a car without wearing a seat belt? No   Have you felt unusual stress, anger or loneliness in the last month? No   Who do you live with? Spouse   If you need help, do you have trouble finding someone available to you? No   Have you been bothered in the last four weeks by sexual problems? No   Do you have difficulty concentrating, remembering or making decisions? No           Does the patient have evidence of cognitive impairment? No    Aspirin use counseling: Does not need ASA (and currently is not on it)      Recent Lab Results:  CMP:  Lab Results   Component Value Date     (H) 11/13/2018    BUN 20 11/13/2018    CREATININE 1.16 11/13/2018    EGFRIFNONA 61 11/13/2018    EGFRIFAFRI 74 11/13/2018    BCR 17.2 11/13/2018     11/13/2018    K 4.3 11/13/2018    CO2 29.9 (H) 11/13/2018    CALCIUM 9.5 11/13/2018    PROTENTOTREF 6.8 11/13/2018    ALBUMIN 4.40 11/13/2018    LABGLOBREF 2.4 11/13/2018    LABIL2 1.8 11/13/2018    BILITOT 0.6 11/13/2018    ALKPHOS 79 11/13/2018    AST 15 11/13/2018    ALT 14 11/13/2018     Lipid Panel:  Lab Results   Component Value Date    TRIG 57 11/13/2018    HDL 64 (H) 11/13/2018    VLDL 11.4 11/13/2018    LDLHDL 1.31 01/30/2018     HbA1c:       Visual Acuity:  No exam data present    Age-appropriate Screening Schedule:  Refer to the list below for future screening recommendations  based on patient's age, sex and/or medical conditions. Orders for these recommended tests are listed in the plan section. The patient has been provided with a written plan.    Health Maintenance   Topic Date Due   • TDAP/TD VACCINES (1 - Tdap) 09/05/1960   • ZOSTER VACCINE (1 of 2) 09/05/1991   • PNEUMOCOCCAL VACCINES (65+ LOW/MEDIUM RISK) (1 of 2 - PCV13) 09/05/2006   • INFLUENZA VACCINE  08/01/2018   • LIPID PANEL  11/13/2019        Immunization History   Administered Date(s) Administered   • Flu Vaccine High Dose PF 65YR+ 10/03/2017, 10/04/2018   • Hepatitis A 05/16/2018   \      Subjective   History of Present Illness    Olu Izquierdo is a 78 y.o. male who presents for an Subsequent Wellness Visit.    The following portions of the patient's history were reviewed and updated as appropriate: allergies, current medications, past family history, past medical history, past social history, past surgical history and problem list.    Outpatient Medications Prior to Visit   Medication Sig Dispense Refill   • atorvastatin (LIPITOR) 10 MG tablet Take 1 tablet by mouth Daily. 90 tablet 1   • hydrALAZINE (APRESOLINE) 25 MG tablet Take 1 tablet by mouth 2 (Two) Times a Day. 180 tablet 1   • hydrochlorothiazide (HYDRODIURIL) 25 MG tablet Take 1 tablet by mouth 2 (Two) Times a Day. 180 tablet 1   • metoprolol succinate XL (TOPROL-XL) 25 MG 24 hr tablet Take 1 tablet by mouth Daily. 90 tablet 0   • potassium chloride (K-DUR) 10 MEQ CR tablet Take 3 tablets by mouth 2 (Two) Times a Day. 540 tablet 1   • ramipril (ALTACE) 5 MG capsule Take 1 capsule by mouth 2 (Two) Times a Day. 180 capsule 1     No facility-administered medications prior to visit.        Patient Active Problem List   Diagnosis   • Essential hypertension   • Hypercholesterolemia   • Hypokalemia       Advance Care Planning:  has an advance directive - a copy HAS NOT been provided. Have asked the patient to send this to us to add to record.    Identification of Risk  "Factors:  Risk factors include: cardiovascular risk.    Review of Systems    Compared to one year ago, the patient feels his physical health is the same.  Compared to one year ago, the patient feels his mental health is the same.    Objective     Physical Exam    Vitals:    11/20/18 1349 11/20/18 1411   BP: 159/72 140/78   Pulse: 83    Temp: 97.1 °F (36.2 °C)    TempSrc: Oral    SpO2: 98%    Weight: 90.3 kg (199 lb 1.6 oz)    Height: 182.9 cm (72.01\")        Patient's Body mass index is 27 kg/m². BMI is within normal parameters. No follow-up required.      Assessment/Plan   Patient Self-Management and Personalized Health Advice  The patient has been provided with information about: exercise, prevention of cardiac or vascular disease and designing advance directives and preventive services including:   · Pneumococcal vaccine , new shingles at pharm.  · Patient declined colon cancer screening    Visit Diagnoses:    ICD-10-CM ICD-9-CM   1. Medicare annual wellness visit, subsequent Z00.00 V70.0   2. Essential hypertension I10 401.9   3. Hypercholesterolemia E78.00 272.0   4. Hypokalemia E87.6 276.8   5. Acute bilateral low back pain with bilateral sciatica M54.42 724.2    M54.41 724.3       Orders Placed This Encounter   Procedures   • Pneumococcal Conjugate Vaccine 13-Valent All   • Ambulatory Referral to Physical Therapy Evaluate and treat     Referral Priority:   Routine     Referral Type:   Therapy     Referral Reason:   Specialty Services Required     Referred to Provider:   Karla Marie, PT DPT     Requested Specialty:   Physical Therapy     Number of Visits Requested:   1       Outpatient Encounter Medications as of 11/20/2018   Medication Sig Dispense Refill   • atorvastatin (LIPITOR) 10 MG tablet Take 1 tablet by mouth Daily. 90 tablet 1   • hydrALAZINE (APRESOLINE) 25 MG tablet Take 1 tablet by mouth 2 (Two) Times a Day. 180 tablet 1   • hydrochlorothiazide (HYDRODIURIL) 25 MG tablet Take 1 tablet by " mouth 2 (Two) Times a Day. 180 tablet 1   • metoprolol succinate XL (TOPROL-XL) 25 MG 24 hr tablet Take 1 tablet by mouth Daily. 90 tablet 0   • potassium chloride (K-DUR) 10 MEQ CR tablet Take 3 tablets by mouth 2 (Two) Times a Day. 540 tablet 1   • ramipril (ALTACE) 5 MG capsule Take 1 capsule by mouth 2 (Two) Times a Day. 180 capsule 1     No facility-administered encounter medications on file as of 11/20/2018.        Reviewed use of high risk medication in the elderly: yes  Reviewed for potential of harmful drug interactions in the elderly: yes    Follow Up:  Return in about 6 months (around 5/20/2019) for Recheck.     An After Visit Summary and PPPS with all of these plans were given to the patient.

## 2018-11-27 ENCOUNTER — TREATMENT (OUTPATIENT)
Dept: PHYSICAL THERAPY | Facility: CLINIC | Age: 78
End: 2018-11-27

## 2018-11-27 DIAGNOSIS — M53.86 SCIATICA ASSOCIATED WITH DISORDER OF LUMBAR SPINE: Primary | ICD-10-CM

## 2018-11-27 PROCEDURE — 97035 APP MDLTY 1+ULTRASOUND EA 15: CPT | Performed by: PHYSICAL THERAPIST

## 2018-11-27 PROCEDURE — G8979 MOBILITY GOAL STATUS: HCPCS | Performed by: PHYSICAL THERAPIST

## 2018-11-27 PROCEDURE — 97161 PT EVAL LOW COMPLEX 20 MIN: CPT | Performed by: PHYSICAL THERAPIST

## 2018-11-27 PROCEDURE — G8978 MOBILITY CURRENT STATUS: HCPCS | Performed by: PHYSICAL THERAPIST

## 2018-11-27 PROCEDURE — 97110 THERAPEUTIC EXERCISES: CPT | Performed by: PHYSICAL THERAPIST

## 2018-11-27 NOTE — PROGRESS NOTES
Physical Therapy Initial Evaluation and Plan of Care    Patient: Olu Izquierdo   : 1940  Diagnosis/ICD-10 Code:  Sciatica associated with disorder of lumbar spine [M53.86]  Referring practitioner: Edenilson Henry MD    Subjective Evaluation    History of Present Illness  Mechanism of injury: Had back surgery  with partial discectomy - has had pain off/on for years but always resolves. 6-8 weeks ago pain started again and went into both posterior thighs and left anterior shin  Self managed with 2 tylenol per day and 2 at night    Walks 2 miles/day at fast pace, does yard work       Patient Occupation: Retired manager of electric [employer]  Current pain ratin  At best pain ratin  At worst pain ratin  Location: Right/Left LB, right>left  posterior thigh and left shin, no foot, residual numbness in left shin since surgery  Quality: knife-like, sharp, burning and radiating  Relieving factors: rest and change in position  Aggravating factors: standing, lifting and repetitive movement (twisting)    Social Support  Lives in: multiple-level home    Diagnostic Tests  No diagnostic tests performed    Treatments  Previous treatment: medication  Current treatment: medication and physical therapy  Patient Goals  Patient goal: be able to do normal activities without pain, return to exericse walking           Objective     Special Questions      Additional Special Questions  No to lumbar special questions      Postural Observations    Additional Postural Observation Details  Left hemipelvis anteriorly rotated compared to right, increased thoracic kyphosis and decreased lumbar lordosis    Palpation   Left   Hypertonic in the erector spinae and quadratus lumborum.     Right   Hypertonic in the erector spinae and quadratus lumborum.     Additional Palpation Details  Tenderness to palpation in the bilateral piriformis muscle  Slight tenderness in left SI joint    Tenderness     Left Hip   Tenderness in the  PSIS.     Right Hip   Tenderness in the PSIS.     Active Range of Motion     Lumbar   Flexion: 50 degrees with pain  Extension: 25 degrees   Left lateral flexion: 50 degrees   Right lateral flexion: 50 degrees   Left rotation: 50 degrees   Right rotation: 50 degrees     Strength/Myotome Testing     Lumbar   Left   Normal strength    Right   Normal strength    Tests     Lumbar     Left   Negative passive SLR, quadrant and valsalva.     Right   Negative passive SLR and quadrant.     Left Pelvic Girdle/Sacrum   Negative: sacrum compression and sacral spring.     Right Pelvic Girdle/Sacrum   Negative: sacrum compression and sacral spring.          Assessment & Plan     Assessment  Impairments: abnormal muscle firing, abnormal muscle tone, abnormal or restricted ROM, activity intolerance, lacks appropriate home exercise program, pain with function and weight-bearing intolerance  Assessment details: 78 y.o. Male with lumbar strain, sciatica presents with: 1. Constant lumbar and intermittent bilateral LE pain, 2. Decreased spinal AROM, 3. Increased thoracic kyphosis and decreased lumbar lordosis in standing, 4. Decreased hip and hamstring flexibility, 5. Slight pelvic asymmetry, 6. Chronic left lower leg numbness   Prognosis: good  Functional Limitations: carrying objects, lifting, walking, pulling, pushing, standing and stooping  Goals  Plan Goals: Short Term Goals: 2 weeks  Patient will be able to tolerate initial exercises  Patient will have pain <5/10  Patient will be able to stand/walk >15 minutes without increased symptoms  Patient will be able to twist at the waist without pain    Long Term Goals: 4 weeks  Patient will be independent in performing home exercise program.  Patient will have functional pain free spinal AROM  Patient will be able to stand/walk for 30 minutes without increased symptoms  Patient will be able to perform light yard work without pain    Plan  Therapy options: will be seen for skilled  physical therapy services  Planned modality interventions: ultrasound  Planned therapy interventions: manual therapy, strengthening, stretching, spinal/joint mobilization and home exercise program  Frequency: 2x week  Duration in visits: 8  Duration in weeks: 4  Treatment plan discussed with: patient  Plan details: Patient issued written HEP of exercises performed in clinic today          Manual Therapy:    5     mins  28983;  Therapeutic Exercise:    25     mins  71990;     Neuromuscular Tremayne:    0    mins  60940;    Therapeutic Activity:     0     mins  61767;       Evaluation Time:     25  mins  Timed Treatment:   38   mins   Total Treatment:     75   mins    PT SIGNATURE: Alicia Walter, PT   DATE TREATMENT INITIATED: 11/27/2018    Initial Certification  Certification Period: 2/25/2019  I certify that the therapy services are furnished while this patient is under my care.  The services outlined above are required by this patient, and will be reviewed every 90 days.     PHYSICIAN: Edenilson Henry MD      DATE:     Please sign and return via fax to [phone].. Thank you, [facility].

## 2018-11-30 ENCOUNTER — TREATMENT (OUTPATIENT)
Dept: PHYSICAL THERAPY | Facility: CLINIC | Age: 78
End: 2018-11-30

## 2018-11-30 DIAGNOSIS — M53.86 SCIATICA ASSOCIATED WITH DISORDER OF LUMBAR SPINE: Primary | ICD-10-CM

## 2018-11-30 PROCEDURE — G0283 ELEC STIM OTHER THAN WOUND: HCPCS | Performed by: PHYSICAL THERAPIST

## 2018-11-30 PROCEDURE — 97140 MANUAL THERAPY 1/> REGIONS: CPT | Performed by: PHYSICAL THERAPIST

## 2018-11-30 PROCEDURE — 97035 APP MDLTY 1+ULTRASOUND EA 15: CPT | Performed by: PHYSICAL THERAPIST

## 2018-11-30 PROCEDURE — 97110 THERAPEUTIC EXERCISES: CPT | Performed by: PHYSICAL THERAPIST

## 2018-11-30 NOTE — PROGRESS NOTES
Physical Therapy Daily Progress Note    VISIT#: 2    Subjective   Olu Izquierdo reports: that he is very sore this morning.  Complaining of increased back pain more left than right.  It loosens up after a bit of time in the morning.       Objective   Presents in flexed spinal positioning, left clinic in more upright position     See Exercise, Manual, and Modality Logs for complete treatment.     Patient Education: required considerable cueing for proper exercise technique    Assessment/Plan  Patient had increased pain upon presentation but was performing HEP incorrectly.  Exhibits symptoms of lumbar stenosis as well as pelvic rotation.    Progress strengthening /stabilization /functional activity           Manual Therapy:    13     mins  14703;  Therapeutic Exercise:    20     mins  29038;     Neuromuscular Tremayne:    0    mins  63949;    Therapeutic Activity:     0     mins  23066;       Timed Treatment:   39   mins   Total Treatment:     70   mins    Alicia Walter, PT  [place] License # [id number]  Physical Therapist

## 2018-12-03 ENCOUNTER — TREATMENT (OUTPATIENT)
Dept: PHYSICAL THERAPY | Facility: CLINIC | Age: 78
End: 2018-12-03

## 2018-12-03 DIAGNOSIS — M53.86 SCIATICA ASSOCIATED WITH DISORDER OF LUMBAR SPINE: Primary | ICD-10-CM

## 2018-12-03 PROCEDURE — G0283 ELEC STIM OTHER THAN WOUND: HCPCS | Performed by: PHYSICAL THERAPIST

## 2018-12-03 PROCEDURE — 97140 MANUAL THERAPY 1/> REGIONS: CPT | Performed by: PHYSICAL THERAPIST

## 2018-12-03 PROCEDURE — 97035 APP MDLTY 1+ULTRASOUND EA 15: CPT | Performed by: PHYSICAL THERAPIST

## 2018-12-03 PROCEDURE — 97110 THERAPEUTIC EXERCISES: CPT | Performed by: PHYSICAL THERAPIST

## 2018-12-03 NOTE — PROGRESS NOTES
Physical Therapy Daily Progress Note    VISIT#: 3    Subjective   Olu Izquierdo reports: that his hamstrings have increased with flexibility and the left calf pain has decreased in frequency but the LBP persists with standing      Objective   Presents with slightly flexed spinal position     Spinal flexion 50% with pain at end range    See Exercise, Manual, and Modality Logs for complete treatment.     Patient Education: need for careful gradual stretching     Assessment/Plan   Slight weakness noted in the left anterior tib as he was slightly dragging his left foot in the swing phase of gait.  His hamstring mobility does seem to e slightly Improved.   Relief with manual traction.    Progress strengthening /stabilization /functional activity  Attempt mechanical traction next visit         Manual Therapy:    11     mins  70444;  Therapeutic Exercise:    20/35     mins  68482;     Neuromuscular Tremayne:    0    mins  31263;    Therapeutic Activity:     0     mins  89077;       Timed Treatment:   39   mins   Total Treatment:     90   mins    Alicia Walter, PT  [place] License # [id number]  Physical Therapist

## 2018-12-06 ENCOUNTER — TREATMENT (OUTPATIENT)
Dept: PHYSICAL THERAPY | Facility: CLINIC | Age: 78
End: 2018-12-06

## 2018-12-06 DIAGNOSIS — M53.86 SCIATICA ASSOCIATED WITH DISORDER OF LUMBAR SPINE: Primary | ICD-10-CM

## 2018-12-06 PROCEDURE — 97110 THERAPEUTIC EXERCISES: CPT | Performed by: PHYSICAL THERAPIST

## 2018-12-06 PROCEDURE — 97140 MANUAL THERAPY 1/> REGIONS: CPT | Performed by: PHYSICAL THERAPIST

## 2018-12-06 PROCEDURE — G0283 ELEC STIM OTHER THAN WOUND: HCPCS | Performed by: PHYSICAL THERAPIST

## 2018-12-06 NOTE — PROGRESS NOTES
Physical Therapy Daily Progress Note    VISIT#: 4    Subjective   Olu Izquierdo reports: that he still has intense pain at times.  States that the left shin pain increases with driving.  Lower back still painful with coughing.       Objective   Slight flexed positioning upon presentation   Tenderness in bilateral SI joints  Relief with LAD    See Exercise, Manual, and Modality Logs for complete treatment.     Patient Education: need for improved flexibility to decrease symptoms    Assessment/Plan  Slow yet steady improvement with LBP.  Able to take longer strides with gait after stretching today.      Progress strengthening /stabilization /functional activity           Manual Therapy:    11     mins  59402;  Therapeutic Exercise:    20/35     mins  95829;     Neuromuscular Tremayne:    0    mins  52708;    Therapeutic Activity:     0     mins  85890;       Timed Treatment:   30   mins   Total Treatment:     70   mins    Alicia Walter, PT  [place] License # [id number]  Physical Therapist

## 2019-01-21 RX ORDER — METOPROLOL SUCCINATE 25 MG/1
25 TABLET, EXTENDED RELEASE ORAL DAILY
Qty: 90 TABLET | Refills: 1 | Status: SHIPPED | OUTPATIENT
Start: 2019-01-21 | End: 2019-07-22 | Stop reason: SDUPTHER

## 2019-02-24 RX ORDER — ATORVASTATIN CALCIUM 10 MG/1
10 TABLET, FILM COATED ORAL DAILY
Qty: 90 TABLET | Refills: 0 | Status: SHIPPED | OUTPATIENT
Start: 2019-02-24 | End: 2019-05-25 | Stop reason: SDUPTHER

## 2019-02-25 RX ORDER — POTASSIUM CHLORIDE 750 MG/1
TABLET, FILM COATED, EXTENDED RELEASE ORAL
Qty: 540 TABLET | Refills: 0 | Status: SHIPPED | OUTPATIENT
Start: 2019-02-25 | End: 2019-05-22 | Stop reason: SDUPTHER

## 2019-03-04 RX ORDER — HYDRALAZINE HYDROCHLORIDE 25 MG/1
TABLET, FILM COATED ORAL
Qty: 180 TABLET | Refills: 1 | Status: SHIPPED | OUTPATIENT
Start: 2019-03-04 | End: 2019-09-03 | Stop reason: SDUPTHER

## 2019-03-25 ENCOUNTER — OFFICE VISIT (OUTPATIENT)
Dept: FAMILY MEDICINE CLINIC | Facility: CLINIC | Age: 79
End: 2019-03-25

## 2019-03-25 VITALS
BODY MASS INDEX: 27.7 KG/M2 | WEIGHT: 204.5 LBS | DIASTOLIC BLOOD PRESSURE: 89 MMHG | SYSTOLIC BLOOD PRESSURE: 157 MMHG | OXYGEN SATURATION: 97 % | TEMPERATURE: 97.2 F | HEIGHT: 72 IN | HEART RATE: 89 BPM

## 2019-03-25 DIAGNOSIS — M48.062 SPINAL STENOSIS OF LUMBAR REGION WITH NEUROGENIC CLAUDICATION: Primary | ICD-10-CM

## 2019-03-25 PROCEDURE — 99214 OFFICE O/P EST MOD 30 MIN: CPT | Performed by: FAMILY MEDICINE

## 2019-03-25 NOTE — PROGRESS NOTES
"Subjective   Olu Izquierdo is a 78 y.o. male.     Chief Complaint   Patient presents with   • Sciatica     bilateral went to physical therapy it got  worse x nov and it just keeps getting worse   • Back Pain     and also on the right upper side like a pulled muscle        History of Present Illness    6-month history of worsening moderate to severe low back pain with bilateral radiation to the thighs and to the back of the knees.  It is worse with prolonged standing and walking.  It is not cramping.  It is fairly symmetric.  There are no urinary changes.  There is some questionable weakness in the lower extremities.  The pain bothers him and is starting to cause functional changes.  He is able to sleep fine until about 6 hours then and then it will wake him up.  He is otherwise able to bend over and move and lift things without pain.  There is been no known injury.  He has not done well with physical therapy.  No depression.  Remote laminectomy many years ago.      The following portions of the patient's history were reviewed and updated as appropriate: allergies, current medications, past family history, past medical history, past social history, past surgical history and problem list.          Review of Systems   Respiratory: Negative.    Cardiovascular: Negative.    Genitourinary: Negative.  Negative for difficulty urinating.   Musculoskeletal: Positive for back pain.   Neurological: Positive for weakness. Negative for numbness.       Objective   Blood pressure 157/89, pulse 89, temperature 97.2 °F (36.2 °C), temperature source Oral, height 182.9 cm (72.01\"), weight 92.8 kg (204 lb 8 oz), SpO2 97 %.  Physical Exam   Constitutional: He appears well-developed and well-nourished. No distress.   Neck: No thyromegaly present.   Cardiovascular: Normal rate.   Pulmonary/Chest: Effort normal.   Musculoskeletal: He exhibits no edema.   Lumbar spine full range of motion with no pain to palpation.  Negative straight leg " lift.  Strength is 5 out of 5 all major muscle groups in lower extremities.  DTRs are diminished +1/4 bilaterally at the patella and also similarly at the Achilles.  Gait is unremarkable.   Skin: Skin is warm and dry.   Psychiatric: He has a normal mood and affect. His behavior is normal. Judgment and thought content normal.   Nursing note and vitals reviewed.      Assessment/Plan   Olu was seen today for sciatica and back pain.    Diagnoses and all orders for this visit:    Spinal stenosis of lumbar region with neurogenic claudication  -     MRI Lumbar Spine Without Contrast; Future        Very probable lumbar spinal stenosis with neurogenic claudication.  Vascular claudication is much less likely.  Further workup needed.  Prognosis unknown.  I am recommending an MRI.  Holding off any medication at this time although gabapentin may be helpful.  Follow-up pending MRI results.

## 2019-03-27 ENCOUNTER — HOSPITAL ENCOUNTER (OUTPATIENT)
Dept: MRI IMAGING | Facility: HOSPITAL | Age: 79
Discharge: HOME | End: 2019-03-27
Admitting: FAMILY MEDICINE

## 2019-03-27 DIAGNOSIS — M48.062 SPINAL STENOSIS OF LUMBAR REGION WITH NEUROGENIC CLAUDICATION: ICD-10-CM

## 2019-03-27 PROCEDURE — A9577 INJ MULTIHANCE: HCPCS | Performed by: FAMILY MEDICINE

## 2019-03-27 PROCEDURE — 72158 MRI LUMBAR SPINE W/O & W/DYE: CPT

## 2019-03-27 PROCEDURE — 82565 ASSAY OF CREATININE: CPT

## 2019-03-27 PROCEDURE — 0 GADOBENATE DIMEGLUMINE 529 MG/ML SOLUTION: Performed by: FAMILY MEDICINE

## 2019-03-27 RX ADMIN — GADOBENATE DIMEGLUMINE 18 ML: 529 INJECTION, SOLUTION INTRAVENOUS at 09:12

## 2019-03-28 DIAGNOSIS — M48.062 SPINAL STENOSIS OF LUMBAR REGION WITH NEUROGENIC CLAUDICATION: Primary | ICD-10-CM

## 2019-03-28 LAB — CREAT BLDA-MCNC: 1.2 MG/DL (ref 0.6–1.3)

## 2019-03-28 NOTE — PROGRESS NOTES
Severe spinal stenosis at the L2-L3 level multiple levels, especially L2-L3 where there is a disc protrusion and also some arthritis areas causing trouble.  I recommend consultation with neurosurgeon.  I will place a consultation for Dr. Dudley

## 2019-04-02 RX ORDER — RAMIPRIL 5 MG/1
CAPSULE ORAL
Qty: 180 CAPSULE | Refills: 0 | Status: SHIPPED | OUTPATIENT
Start: 2019-04-02 | End: 2019-05-20

## 2019-04-09 ENCOUNTER — TELEPHONE (OUTPATIENT)
Dept: NEUROSURGERY | Facility: CLINIC | Age: 79
End: 2019-04-09

## 2019-04-09 NOTE — TELEPHONE ENCOUNTER
I called and spoke with the patient and he did receive the packet and will be mailing it out tomorrow.  Confirmed appointment on 4-30-19 @ 245 pm and arriving 215 pm.

## 2019-04-24 NOTE — PROGRESS NOTES
Subjective   Patient ID: Olu Izquierdo is a 78 y.o. male is being seen for consultation today at the request of Edenilson Henry MD for back pain that radiates into bilateral thigh's with tingling.    History of Present Illness    This patient has been having severe pain in his back with radiation into his legs since last November.  The pain is sharp and stabbing and quite severe.  It is located all the way across his lower back and radiates into his buttocks on both sides.  It radiates into his posterior lateral thigh down to the knee.  On the left side it goes into his anterior lower leg but not on the right.  He has no difficulty with bowel or bladder control or other associated symptoms.  He has been treated with physical therapy which made it worse as well as anti-inflammatory medications which did not help.  Activity makes the pain worse.  He did have a previous L4-5 discectomy by Dr. Hoang in the mid 80s.    The following portions of the patient's history were reviewed and updated as appropriate: allergies, current medications, past family history, past medical history, past social history, past surgical history and problem list.    Review of Systems   Constitutional: Positive for activity change.   Respiratory: Negative for chest tightness and shortness of breath.    Cardiovascular: Negative for chest pain.   Genitourinary: Positive for frequency.   Musculoskeletal: Positive for back pain, gait problem and myalgias.        Bilateral thigh pain   Neurological: Negative for weakness and numbness.        Positive for tingling   All other systems reviewed and are negative.      Objective   Physical Exam   Constitutional: He is oriented to person, place, and time. He appears well-developed and well-nourished.   HENT:   Head: Normocephalic and atraumatic.   Eyes: Conjunctivae and EOM are normal. Pupils are equal, round, and reactive to light.   Fundoscopic exam:       The right eye shows no papilledema. The right  eye shows venous pulsations.        The left eye shows no papilledema. The left eye shows venous pulsations.   Neck: Carotid bruit is not present.   Neurological: He is oriented to person, place, and time. He has a normal Finger-Nose-Finger Test and a normal Heel to Shin Test. Gait normal.   Reflex Scores:       Tricep reflexes are 2+ on the right side and 2+ on the left side.       Bicep reflexes are 2+ on the right side and 2+ on the left side.       Brachioradialis reflexes are 2+ on the right side and 2+ on the left side.       Patellar reflexes are 2+ on the right side and 2+ on the left side.       Achilles reflexes are 2+ on the right side and 2+ on the left side.  Psychiatric: His speech is normal.     Neurologic Exam     Mental Status   Oriented to person, place, and time.   Registration of memory: Good recent and remote memory.   Attention: normal. Concentration: normal.   Speech: speech is normal   Level of consciousness: alert  Knowledge: consistent with education.     Cranial Nerves     CN II   Visual fields full to confrontation.   Visual acuity: normal    CN III, IV, VI   Pupils are equal, round, and reactive to light.  Extraocular motions are normal.     CN V   Facial sensation intact.   Right corneal reflex: normal  Left corneal reflex: normal    CN VII   Facial expression full, symmetric.   Right facial weakness: none  Left facial weakness: none    CN VIII   Hearing: intact    CN IX, X   Palate: symmetric    CN XI   Right sternocleidomastoid strength: normal  Left sternocleidomastoid strength: normal    CN XII   Tongue: not atrophic  Tongue deviation: none    Motor Exam   Muscle bulk: normal  Right arm tone: normal  Left arm tone: normal  Right leg tone: normal  Left leg tone: normal    Strength   Strength 5/5 except as noted.     Sensory Exam   Light touch normal.     Gait, Coordination, and Reflexes     Gait  Gait: normal    Coordination   Finger to nose coordination: normal  Heel to shin  coordination: normal    Reflexes   Right brachioradialis: 2+  Left brachioradialis: 2+  Right biceps: 2+  Left biceps: 2+  Right triceps: 2+  Left triceps: 2+  Right patellar: 2+  Left patellar: 2+  Right achilles: 2+  Left achilles: 2+  Right : 2+  Left : 2+      Assessment/Plan   Independent Review of Radiographic Studies:      I reviewed an MRI of his lumbar spine done on 27 March of this year.  The axial images show widely patent canal and neuroforamina at L1-2 and T12-L1.  L2-3 shows a large disc bulge with severe stenosis.  L3-4 also shows fairly severe stenosis.  L4-5 is more open and it looks like he has had previous surgery on the left side at that level and L5-S1 mostly looks okay.    Medical Decision Making:      I told the patient about the imaging.  I told him we could certainly proceed with a myelogram and surgery but I would recommend some further nonsurgical treatment as a next step.  To that end I suggested some epidural blocks.  I will see him back after to have been done.    Olu was seen today for back pain.    Diagnoses and all orders for this visit:    Spinal stenosis of lumbar region with neurogenic claudication  -     Epidural Block      Return for Recheck and call after treatment or consultation.

## 2019-04-30 ENCOUNTER — OFFICE VISIT (OUTPATIENT)
Dept: NEUROSURGERY | Facility: CLINIC | Age: 79
End: 2019-04-30

## 2019-04-30 VITALS
HEIGHT: 72 IN | HEART RATE: 92 BPM | DIASTOLIC BLOOD PRESSURE: 88 MMHG | BODY MASS INDEX: 27.09 KG/M2 | WEIGHT: 200 LBS | SYSTOLIC BLOOD PRESSURE: 164 MMHG

## 2019-04-30 DIAGNOSIS — M48.062 SPINAL STENOSIS OF LUMBAR REGION WITH NEUROGENIC CLAUDICATION: Primary | ICD-10-CM

## 2019-04-30 PROCEDURE — 99203 OFFICE O/P NEW LOW 30 MIN: CPT | Performed by: NEUROLOGICAL SURGERY

## 2019-05-10 DIAGNOSIS — E87.6 HYPOKALEMIA: ICD-10-CM

## 2019-05-10 DIAGNOSIS — I10 ESSENTIAL HYPERTENSION: Primary | ICD-10-CM

## 2019-05-10 DIAGNOSIS — E78.00 HYPERCHOLESTEROLEMIA: ICD-10-CM

## 2019-05-10 RX ORDER — HYDROCHLOROTHIAZIDE 25 MG/1
TABLET ORAL
Qty: 180 TABLET | Refills: 1 | Status: SHIPPED | OUTPATIENT
Start: 2019-05-10 | End: 2019-08-22

## 2019-05-13 ENCOUNTER — ANESTHESIA (OUTPATIENT)
Dept: PAIN MEDICINE | Facility: HOSPITAL | Age: 79
End: 2019-05-13

## 2019-05-13 ENCOUNTER — HOSPITAL ENCOUNTER (OUTPATIENT)
Dept: GENERAL RADIOLOGY | Facility: HOSPITAL | Age: 79
Discharge: HOME | End: 2019-05-13

## 2019-05-13 ENCOUNTER — ANESTHESIA EVENT (OUTPATIENT)
Dept: PAIN MEDICINE | Facility: HOSPITAL | Age: 79
End: 2019-05-13

## 2019-05-13 ENCOUNTER — HOSPITAL ENCOUNTER (OUTPATIENT)
Dept: PAIN MEDICINE | Facility: HOSPITAL | Age: 79
Discharge: HOME | End: 2019-05-13
Admitting: ANESTHESIOLOGY

## 2019-05-13 VITALS
SYSTOLIC BLOOD PRESSURE: 152 MMHG | WEIGHT: 200 LBS | HEIGHT: 72 IN | RESPIRATION RATE: 16 BRPM | DIASTOLIC BLOOD PRESSURE: 82 MMHG | OXYGEN SATURATION: 96 % | HEART RATE: 71 BPM | BODY MASS INDEX: 27.09 KG/M2 | TEMPERATURE: 98.1 F

## 2019-05-13 DIAGNOSIS — R52 PAIN: ICD-10-CM

## 2019-05-13 DIAGNOSIS — M48.062 SPINAL STENOSIS OF LUMBAR REGION WITH NEUROGENIC CLAUDICATION: Primary | ICD-10-CM

## 2019-05-13 LAB
ALBUMIN SERPL-MCNC: 4.5 G/DL (ref 3.5–5.2)
ALBUMIN/GLOB SERPL: 1.8 G/DL
ALP SERPL-CCNC: 81 U/L (ref 39–117)
ALT SERPL-CCNC: 23 U/L (ref 1–41)
AST SERPL-CCNC: 18 U/L (ref 1–40)
BILIRUB SERPL-MCNC: 0.5 MG/DL (ref 0.2–1.2)
BUN SERPL-MCNC: 18 MG/DL (ref 8–23)
BUN/CREAT SERPL: 17.3 (ref 7–25)
CALCIUM SERPL-MCNC: 10 MG/DL (ref 8.6–10.5)
CHLORIDE SERPL-SCNC: 98 MMOL/L (ref 98–107)
CHOLEST SERPL-MCNC: 158 MG/DL (ref 0–200)
CO2 SERPL-SCNC: 29.2 MMOL/L (ref 22–29)
CREAT SERPL-MCNC: 1.04 MG/DL (ref 0.76–1.27)
GLOBULIN SER CALC-MCNC: 2.5 GM/DL
GLUCOSE SERPL-MCNC: 101 MG/DL (ref 65–99)
HDLC SERPL-MCNC: 61 MG/DL (ref 40–60)
LDLC SERPL CALC-MCNC: 75 MG/DL (ref 0–100)
POTASSIUM SERPL-SCNC: 3.9 MMOL/L (ref 3.5–5.2)
PROT SERPL-MCNC: 7 G/DL (ref 6–8.5)
SODIUM SERPL-SCNC: 138 MMOL/L (ref 136–145)
TRIGL SERPL-MCNC: 111 MG/DL (ref 0–150)
VLDLC SERPL CALC-MCNC: 22.2 MG/DL

## 2019-05-13 PROCEDURE — 77003 FLUOROGUIDE FOR SPINE INJECT: CPT

## 2019-05-13 PROCEDURE — 25010000002 FENTANYL CITRATE (PF) 100 MCG/2ML SOLUTION: Performed by: ANESTHESIOLOGY

## 2019-05-13 PROCEDURE — 0 IOPAMIDOL 41 % SOLUTION: Performed by: ANESTHESIOLOGY

## 2019-05-13 PROCEDURE — 25010000002 METHYLPREDNISOLONE PER 80 MG: Performed by: ANESTHESIOLOGY

## 2019-05-13 PROCEDURE — C1755 CATHETER, INTRASPINAL: HCPCS

## 2019-05-13 PROCEDURE — 25010000002 MIDAZOLAM PER 1 MG: Performed by: ANESTHESIOLOGY

## 2019-05-13 RX ORDER — FENTANYL CITRATE 50 UG/ML
50 INJECTION, SOLUTION INTRAMUSCULAR; INTRAVENOUS AS NEEDED
Status: DISCONTINUED | OUTPATIENT
Start: 2019-05-13 | End: 2019-05-14 | Stop reason: HOSPADM

## 2019-05-13 RX ORDER — MIDAZOLAM HYDROCHLORIDE 1 MG/ML
1 INJECTION INTRAMUSCULAR; INTRAVENOUS
Status: DISCONTINUED | OUTPATIENT
Start: 2019-05-13 | End: 2019-05-14 | Stop reason: HOSPADM

## 2019-05-13 RX ORDER — LIDOCAINE HYDROCHLORIDE 10 MG/ML
1 INJECTION, SOLUTION INFILTRATION; PERINEURAL ONCE
Status: DISCONTINUED | OUTPATIENT
Start: 2019-05-13 | End: 2019-05-14 | Stop reason: HOSPADM

## 2019-05-13 RX ORDER — ACETAMINOPHEN 500 MG
500 TABLET ORAL DAILY
COMMUNITY

## 2019-05-13 RX ORDER — SODIUM CHLORIDE 0.9 % (FLUSH) 0.9 %
3 SYRINGE (ML) INJECTION EVERY 12 HOURS SCHEDULED
Status: DISCONTINUED | OUTPATIENT
Start: 2019-05-13 | End: 2019-05-14 | Stop reason: HOSPADM

## 2019-05-13 RX ORDER — SODIUM CHLORIDE 0.9 % (FLUSH) 0.9 %
3-10 SYRINGE (ML) INJECTION AS NEEDED
Status: DISCONTINUED | OUTPATIENT
Start: 2019-05-13 | End: 2019-05-14 | Stop reason: HOSPADM

## 2019-05-13 RX ORDER — ACETAMINOPHEN,DIPHENHYDRAMINE HCL 500; 25 MG/1; MG/1
2 TABLET, FILM COATED ORAL NIGHTLY PRN
COMMUNITY

## 2019-05-13 RX ORDER — METHYLPREDNISOLONE ACETATE 80 MG/ML
80 INJECTION, SUSPENSION INTRA-ARTICULAR; INTRALESIONAL; INTRAMUSCULAR; SOFT TISSUE ONCE
Status: COMPLETED | OUTPATIENT
Start: 2019-05-13 | End: 2019-05-13

## 2019-05-13 RX ORDER — LIDOCAINE HYDROCHLORIDE 10 MG/ML
0.5 INJECTION, SOLUTION INFILTRATION; PERINEURAL ONCE AS NEEDED
Status: DISCONTINUED | OUTPATIENT
Start: 2019-05-13 | End: 2019-05-14 | Stop reason: HOSPADM

## 2019-05-13 RX ADMIN — FENTANYL CITRATE 50 MCG: 50 INJECTION, SOLUTION INTRAMUSCULAR; INTRAVENOUS at 13:09

## 2019-05-13 RX ADMIN — IOPAMIDOL 10 ML: 408 INJECTION, SOLUTION INTRATHECAL at 13:13

## 2019-05-13 RX ADMIN — MIDAZOLAM 2 MG: 1 INJECTION INTRAMUSCULAR; INTRAVENOUS at 13:09

## 2019-05-13 RX ADMIN — METHYLPREDNISOLONE ACETATE 80 MG: 80 INJECTION, SUSPENSION INTRA-ARTICULAR; INTRALESIONAL; INTRAMUSCULAR; SOFT TISSUE at 13:13

## 2019-05-13 NOTE — ANESTHESIA PROCEDURE NOTES
PAIN Epidural block    Pre-sedation assessment completed: 5/13/2019 1:04 PM    Patient reassessed immediately prior to procedure    Patient location during procedure: pain clinic  Start Time: 5/13/2019 1:04 PM  Stop Time: 5/13/2019 1:19 PM  Indication:procedure for pain  Performed By  Anesthesiologist: Blaise Leone MD  Preanesthetic Checklist  Completed: patient identified, site marked, surgical consent, pre-op evaluation, timeout performed, risks and benefits discussed and monitors and equipment checked  Additional Notes  Depomedrol - 80mg    Needle position confirmed by fluoroscopy and epidurogram using 2cc of [id number].    Diagnosis  Post-Op Diagnosis Codes:     * Lumbar spinal stenosis (M48.061)     * Lumbar radiculopathy (M54.16)    Prep:  Pt Position:prone  Sterile Tech:cap, gloves, mask and sterile barrier  Prep:chlorhexidine gluconate and isopropyl alcohol  Monitoring:blood pressure monitoring, continuous pulse oximetry and EKG  Procedure:Sedation: yes     Approach:left paramedian  Guidance: fluoroscopy  Location:lumbar  Level:2-3  Needle Type:Tuohy  Needle Gauge:20  Aspiration:negative  Medications:  Depomedrol:80 mg  Preservative Free Saline:2mL  Isovue:2mL    Post Assessment:  Post-procedure: bandaide.  Pt Tolerance:patient tolerated the procedure well with no apparent complications  Complications:no

## 2019-05-13 NOTE — H&P
"[facility]    History and Physical    Patient Name: Olu Izquierdo  :  1940  MRN:  [id number]  Date of Admission: 2019    Subjective     Patient is a 78 y.o. male presents with chief complaint of chronic, intermitent, moderate low back and leg: bilateral pain.  Onset of symptoms was gradual starting several months ago.  Symptoms are associated/aggravated by activity or twisting. Symptoms improve with rest    The following portions of the patients history were reviewed and updated as appropriate: current medications, allergies, past medical history, past surgical history, past family history, past social history and problem list                Objective     Past Medical History:   Past Medical History:   Diagnosis Date   • Arthritis    • Depression    • Hyperlipidemia    • Hypertension    • Injury of back     Back surgery 1984 partial discectomy with \"sciatic nerve damage\"   • Low back pain      Past Surgical History:   Past Surgical History:   Procedure Laterality Date   • BACK SURGERY     • CATARACT EXTRACTION, BILATERAL     • EPIDURAL BLOCK       Family History:   Family History   Problem Relation Age of Onset   • Heart disease Mother    • Hypertension Mother    • Alcohol abuse Father      Social History:   Social History     Tobacco Use   • Smoking status: Never Smoker   • Smokeless tobacco: Never Used   Substance Use Topics   • Alcohol use: Yes     Comment: 1 a night    • Drug use: No       Vital Signs Range for the last 24 hours  Temperature:     Temp Source:     BP:     Pulse:     Respirations:     SPO2:     O2 Amount (l/min):     O2 Devices     Weight: Weight:  [90.7 kg (200 lb)] 90.7 kg (200 lb)     Flowsheet Rows      First Filed Value   Admission Height  182.9 cm (72\") Documented at 2019 1239   Admission Weight  90.7 kg (200 lb) Documented at 2019 1239          --------------------------------------------------------------------------------    Current Outpatient Medications "   Medication Sig Dispense Refill   • acetaminophen (TYLENOL) 500 MG tablet Take 500 mg by mouth Daily. PT STATES HE TAKES 3 TABLETS IN THE AM     • atorvastatin (LIPITOR) 10 MG tablet TAKE 1 TABLET BY MOUTH DAILY 90 tablet 0   • diphenhydrAMINE-acetaminophen (TYLENOL PM)  MG tablet per tablet Take 2 tablets by mouth At Night As Needed for Sleep.     • hydrALAZINE (APRESOLINE) 25 MG tablet TAKE 1 TABLET BY MOUTH TWICE DAILY 180 tablet 1   • hydrochlorothiazide (HYDRODIURIL) 25 MG tablet TAKE 1 TABLET BY MOUTH TWICE DAILY 180 tablet 1   • metoprolol succinate XL (TOPROL-XL) 25 MG 24 hr tablet TAKE 1 TABLET BY MOUTH DAILY 90 tablet 1   • potassium chloride (K-DUR) 10 MEQ CR tablet TAKE 3 TABLETS BY MOUTH TWICE DAILY 540 tablet 0   • ramipril (ALTACE) 5 MG capsule TAKE 1 CAPSULE BY MOUTH TWICE DAILY 180 capsule 0     No current facility-administered medications for this encounter.        --------------------------------------------------------------------------------  Assessment/Plan      Anesthesia Evaluation     Patient summary reviewed and Nursing notes reviewed   NPO Solid Status: > 8 hours  NPO Liquid Status: > 2 hours    Pain impairs ability to perform ADLs: Sleeping  Modalities previously tried to control pain with limited effectiveness within the last 4-6 weeks: Rest     Airway   Mallampati: II  TM distance: >3 FB  Neck ROM: full  Dental - normal exam     Pulmonary - negative pulmonary ROS and normal exam   Cardiovascular - normal exam    (+) hypertension, hyperlipidemia,       Neuro/Psych- neuro exam normal  (+) psychiatric history Depression,     GI/Hepatic/Renal/Endo - negative ROS     Musculoskeletal (-) normal exam    (+) back pain, radiculopathy  Abdominal  - normal exam   Substance History - negative use     OB/GYN negative ob/gyn ROS         Other                   Diagnosis and Plan    Treatment Plan  ASA 3      Procedures: Lumbar Epidural Steroid Injection(LESI), With fluoroscopy,        Anesthetic plan and risks discussed with patient.          Diagnosis     * Lumbar spinal stenosis [M48.061]     * Lumbar radiculopathy [M54.16]

## 2019-05-20 ENCOUNTER — OFFICE VISIT (OUTPATIENT)
Dept: FAMILY MEDICINE CLINIC | Facility: CLINIC | Age: 79
End: 2019-05-20

## 2019-05-20 VITALS
HEART RATE: 77 BPM | WEIGHT: 206.8 LBS | BODY MASS INDEX: 28.01 KG/M2 | SYSTOLIC BLOOD PRESSURE: 146 MMHG | DIASTOLIC BLOOD PRESSURE: 82 MMHG | TEMPERATURE: 98.4 F | OXYGEN SATURATION: 99 % | HEIGHT: 72 IN

## 2019-05-20 DIAGNOSIS — E78.00 HYPERCHOLESTEROLEMIA: ICD-10-CM

## 2019-05-20 DIAGNOSIS — E87.6 HYPOKALEMIA: ICD-10-CM

## 2019-05-20 DIAGNOSIS — I10 ESSENTIAL HYPERTENSION: Primary | ICD-10-CM

## 2019-05-20 DIAGNOSIS — M48.062 SPINAL STENOSIS OF LUMBAR REGION WITH NEUROGENIC CLAUDICATION: ICD-10-CM

## 2019-05-20 PROCEDURE — 99214 OFFICE O/P EST MOD 30 MIN: CPT | Performed by: FAMILY MEDICINE

## 2019-05-20 RX ORDER — RAMIPRIL 10 MG/1
10 CAPSULE ORAL 2 TIMES DAILY
Qty: 180 CAPSULE | Refills: 1 | Status: SHIPPED | OUTPATIENT
Start: 2019-05-20 | End: 2019-11-14 | Stop reason: SDUPTHER

## 2019-05-20 NOTE — PROGRESS NOTES
"Subjective   Olu Izquierdo is a 78 y.o. male.     Hypertension (follow up labs)    History of Present Illness    Hypertension follow up. Doing well with current medication which he is taking as directed.  Blood pressure has been running high at the office.  He does not check at home.  No cardiovascular or neurological symptoms. Today's BP: 146/82.      Hyperlipidemia follow up. He is taking statin medication without complaint. No myopathy symptoms.     Last lipid panel:   Lab Results   Component Value Date    HDL 61 (H) 05/13/2019     Lab Results   Component Value Date    LDL 75 05/13/2019     Lab Results   Component Value Date    TRIG 111 05/13/2019     Spinal stenosis with neurogenic claudication.  He saw the neurosurgeon.  They are doing epidural blocks.  He had his first 1 done.  It helped a little bit.  He seems pleased so far.  He has a second 1 scheduled.    Hypokalemia.  Long-standing.  He is on high doses of potassium supplementation along with hydrochlorothiazide hydralazine and metoprolol.  He also takes ramipril 5 mg twice a day.  His blood pressure remains high in the office today.    The following portions of the patient's history were reviewed and updated as appropriate: allergies, current medications, past family history, past medical history, past social history, past surgical history and problem list.      Review of Systems   Constitutional: Negative.    Respiratory: Negative.    Cardiovascular: Negative.    Musculoskeletal: Negative.        Objective   Blood pressure 146/82, pulse 77, temperature 98.4 °F (36.9 °C), temperature source Oral, height 182.9 cm (72.01\"), weight 93.8 kg (206 lb 12.8 oz), SpO2 99 %.  Physical Exam   Constitutional: He appears well-developed and well-nourished. No distress.   Neck: No thyromegaly present.   Cardiovascular: Normal rate, regular rhythm, normal heart sounds and intact distal pulses.   Pulmonary/Chest: Effort normal and breath sounds normal.   Musculoskeletal: " He exhibits no edema.   Skin: Skin is warm and dry.   Psychiatric: He has a normal mood and affect. His behavior is normal. Judgment and thought content normal.   Nursing note and vitals reviewed.      Assessment/Plan   Olu was seen today for hypertension.    Diagnoses and all orders for this visit:    Essential hypertension  -     Basic Metabolic Panel; Future    Hypercholesterolemia    Spinal stenosis of lumbar region with neurogenic claudication    Hypokalemia    Other orders  -     ramipril (ALTACE) 10 MG capsule; Take 1 capsule by mouth 2 (Two) Times a Day.      Hypertension.  Needs better control.  Increasing the Altace from 5 mg twice a day up to 10 mg twice a day which is the maximum dose.  He continues hydralazine, hydrochlorothiazide, and metoprolol.  I have asked him to check his blood pressure at home on a regular basis and send us numbers within 3 weeks.    Hypokalemia.  He continues the potassium supplementation at 60 mEq a day.  We will need to watch for a potassium increase with the increase in the Altace.  He is going to check a BMP in 2 weeks.    Spinal stenosis with neurogenic claudication of the lumbar area.  He continues with neurosurgery.  He is next epidural steroid injection ordered.    Hyperlipidemia.  He continues atorvastatin.    Follow-up in 6 months for recheck and wellness visit

## 2019-05-23 RX ORDER — POTASSIUM CHLORIDE 750 MG/1
TABLET, FILM COATED, EXTENDED RELEASE ORAL
Qty: 540 TABLET | Refills: 1 | Status: SHIPPED | OUTPATIENT
Start: 2019-05-23 | End: 2019-10-03

## 2019-05-28 RX ORDER — ATORVASTATIN CALCIUM 10 MG/1
10 TABLET, FILM COATED ORAL DAILY
Qty: 90 TABLET | Refills: 1 | Status: SHIPPED | OUTPATIENT
Start: 2019-05-28 | End: 2019-11-24 | Stop reason: SDUPTHER

## 2019-05-29 ENCOUNTER — OFFICE VISIT (OUTPATIENT)
Dept: FAMILY MEDICINE CLINIC | Facility: CLINIC | Age: 79
End: 2019-05-29

## 2019-05-29 VITALS
HEART RATE: 82 BPM | WEIGHT: 204.8 LBS | OXYGEN SATURATION: 98 % | HEIGHT: 72 IN | DIASTOLIC BLOOD PRESSURE: 76 MMHG | SYSTOLIC BLOOD PRESSURE: 169 MMHG | TEMPERATURE: 97.2 F | BODY MASS INDEX: 27.74 KG/M2

## 2019-05-29 DIAGNOSIS — L24.7 IRRITANT CONTACT DERMATITIS DUE TO PLANTS, EXCEPT FOOD: Primary | ICD-10-CM

## 2019-05-29 PROCEDURE — 99213 OFFICE O/P EST LOW 20 MIN: CPT | Performed by: FAMILY MEDICINE

## 2019-05-29 NOTE — PROGRESS NOTES
"Subjective   Olu Izquierdo is a 78 y.o. male.     Chief Complaint   Patient presents with   • Rash     right site, under arm        History of Present Illness    Rash about 5 days.  Right thorax.  Also right shoulder right axilla.  Linear streaks.  Very itchy.  Also some burning on the bilateral axilla without rash.  He was doing some yard work a few days ago.  Had assured on.  No rash elsewhere.    Hypertension.  Long-standing.  Originally started on hydralazine.  They developed hand swelling.  He was placed on hydrochlorothiazide.  He was then given supplementation with potassium.  Because of hypokalemia.  He also takes a beta-blocker and ramipril.  We double the dose of the ACE inhibitor recently, his blood pressures during 150 systolic at home.  His aldosterone renin levels were normal late last year.  Recent potassium levels were on the low side but still normal.  We are rechecking now that he is been on the higher dose of the ACE inhibitor.  He also continues metoprolol XL 25 mg daily.      The following portions of the patient's history were reviewed and updated as appropriate: allergies, current medications, past family history, past medical history, past social history, past surgical history and problem list.          Review of Systems   Constitutional: Negative.    Respiratory: Negative.    Cardiovascular: Negative.    Musculoskeletal: Negative.    Skin: Positive for rash.       Objective   Blood pressure 169/76, pulse 82, temperature 97.2 °F (36.2 °C), temperature source Oral, height 182.9 cm (72.01\"), weight 92.9 kg (204 lb 12.8 oz), SpO2 98 %.  Physical Exam   Constitutional: No distress.   Cardiovascular: Normal rate.   Pulmonary/Chest: Effort normal.   Skin: Rash noted.   Pruritic erythematous streaks and some papular lesions along the right thorax into the right axilla.  Not vesicular.       Assessment/Plan   Olu was seen today for rash.    Diagnoses and all orders for this visit:    Irritant " contact dermatitis due to plants, except food    Other orders  -     triamcinolone (KENALOG) 0.1 % ointment; Apply  topically to the appropriate area as directed 2 (Two) Times a Day.      Probable contact dermatitis.  Mild poison ivy possible.  At this point holding off on steroids.  Triamcinolone ointment 0.1% twice a day as needed.  If not improving let us know.    Partially resistant hypertension.  Hypokalemia related to the hydrochlorothiazide.  Normal aldosterone renin ratio.  Recently increase the Altace.  Checking BMP next week.  If potassium still reasonable and blood pressure continues remain high, add Aldactone and decrease potassium at same time.  Otherwise follow-up as indicated

## 2019-06-03 ENCOUNTER — RESULTS ENCOUNTER (OUTPATIENT)
Dept: FAMILY MEDICINE CLINIC | Facility: CLINIC | Age: 79
End: 2019-06-03

## 2019-06-03 DIAGNOSIS — I10 ESSENTIAL HYPERTENSION: ICD-10-CM

## 2019-06-04 LAB
BUN SERPL-MCNC: 20 MG/DL (ref 8–23)
BUN/CREAT SERPL: 19.4 (ref 7–25)
CALCIUM SERPL-MCNC: 10.2 MG/DL (ref 8.6–10.5)
CHLORIDE SERPL-SCNC: 101 MMOL/L (ref 98–107)
CO2 SERPL-SCNC: 28.7 MMOL/L (ref 22–29)
CREAT SERPL-MCNC: 1.03 MG/DL (ref 0.76–1.27)
GLUCOSE SERPL-MCNC: 105 MG/DL (ref 65–99)
POTASSIUM SERPL-SCNC: 4.5 MMOL/L (ref 3.5–5.2)
SODIUM SERPL-SCNC: 141 MMOL/L (ref 136–145)

## 2019-06-10 ENCOUNTER — HOSPITAL ENCOUNTER (OUTPATIENT)
Dept: GENERAL RADIOLOGY | Facility: HOSPITAL | Age: 79
Discharge: HOME | End: 2019-06-10

## 2019-06-10 ENCOUNTER — ANESTHESIA (OUTPATIENT)
Dept: PAIN MEDICINE | Facility: HOSPITAL | Age: 79
End: 2019-06-10

## 2019-06-10 ENCOUNTER — HOSPITAL ENCOUNTER (OUTPATIENT)
Dept: PAIN MEDICINE | Facility: HOSPITAL | Age: 79
Discharge: HOME | End: 2019-06-10
Admitting: ANESTHESIOLOGY

## 2019-06-10 ENCOUNTER — ANESTHESIA EVENT (OUTPATIENT)
Dept: PAIN MEDICINE | Facility: HOSPITAL | Age: 79
End: 2019-06-10

## 2019-06-10 VITALS
BODY MASS INDEX: 27.09 KG/M2 | OXYGEN SATURATION: 96 % | RESPIRATION RATE: 16 BRPM | HEART RATE: 77 BPM | SYSTOLIC BLOOD PRESSURE: 132 MMHG | WEIGHT: 200 LBS | HEIGHT: 72 IN | DIASTOLIC BLOOD PRESSURE: 78 MMHG | TEMPERATURE: 98 F

## 2019-06-10 DIAGNOSIS — R52 PAIN: ICD-10-CM

## 2019-06-10 DIAGNOSIS — M48.062 SPINAL STENOSIS OF LUMBAR REGION WITH NEUROGENIC CLAUDICATION: ICD-10-CM

## 2019-06-10 PROCEDURE — 25010000002 MIDAZOLAM PER 1 MG: Performed by: ANESTHESIOLOGY

## 2019-06-10 PROCEDURE — C1755 CATHETER, INTRASPINAL: HCPCS

## 2019-06-10 PROCEDURE — 25010000002 METHYLPREDNISOLONE PER 80 MG: Performed by: ANESTHESIOLOGY

## 2019-06-10 PROCEDURE — 25010000002 FENTANYL CITRATE (PF) 100 MCG/2ML SOLUTION: Performed by: ANESTHESIOLOGY

## 2019-06-10 PROCEDURE — 77003 FLUOROGUIDE FOR SPINE INJECT: CPT

## 2019-06-10 PROCEDURE — 0 IOPAMIDOL 41 % SOLUTION: Performed by: ANESTHESIOLOGY

## 2019-06-10 RX ORDER — SODIUM CHLORIDE 0.9 % (FLUSH) 0.9 %
1-10 SYRINGE (ML) INJECTION AS NEEDED
Status: DISCONTINUED | OUTPATIENT
Start: 2019-06-10 | End: 2019-06-11 | Stop reason: HOSPADM

## 2019-06-10 RX ORDER — LIDOCAINE HYDROCHLORIDE 10 MG/ML
1 INJECTION, SOLUTION INFILTRATION; PERINEURAL ONCE AS NEEDED
Status: DISCONTINUED | OUTPATIENT
Start: 2019-06-10 | End: 2019-06-11 | Stop reason: HOSPADM

## 2019-06-10 RX ORDER — SODIUM CHLORIDE 0.9 % (FLUSH) 0.9 %
3-10 SYRINGE (ML) INJECTION AS NEEDED
Status: DISCONTINUED | OUTPATIENT
Start: 2019-06-10 | End: 2019-06-11 | Stop reason: HOSPADM

## 2019-06-10 RX ORDER — MIDAZOLAM HYDROCHLORIDE 1 MG/ML
1 INJECTION INTRAMUSCULAR; INTRAVENOUS AS NEEDED
Status: DISCONTINUED | OUTPATIENT
Start: 2019-06-10 | End: 2019-06-11 | Stop reason: HOSPADM

## 2019-06-10 RX ORDER — FENTANYL CITRATE 50 UG/ML
50 INJECTION, SOLUTION INTRAMUSCULAR; INTRAVENOUS AS NEEDED
Status: DISCONTINUED | OUTPATIENT
Start: 2019-06-10 | End: 2019-06-11 | Stop reason: HOSPADM

## 2019-06-10 RX ORDER — SODIUM CHLORIDE 0.9 % (FLUSH) 0.9 %
3 SYRINGE (ML) INJECTION EVERY 12 HOURS SCHEDULED
Status: DISCONTINUED | OUTPATIENT
Start: 2019-06-10 | End: 2019-06-11 | Stop reason: HOSPADM

## 2019-06-10 RX ORDER — METHYLPREDNISOLONE ACETATE 80 MG/ML
80 INJECTION, SUSPENSION INTRA-ARTICULAR; INTRALESIONAL; INTRAMUSCULAR; SOFT TISSUE ONCE
Status: COMPLETED | OUTPATIENT
Start: 2019-06-10 | End: 2019-06-10

## 2019-06-10 RX ADMIN — METHYLPREDNISOLONE ACETATE 80 MG: 80 INJECTION, SUSPENSION INTRA-ARTICULAR; INTRALESIONAL; INTRAMUSCULAR; SOFT TISSUE at 13:20

## 2019-06-10 RX ADMIN — IOPAMIDOL 10 ML: 408 INJECTION, SOLUTION INTRATHECAL at 13:20

## 2019-06-10 RX ADMIN — FENTANYL CITRATE 50 MCG: 50 INJECTION INTRAMUSCULAR; INTRAVENOUS at 13:10

## 2019-06-10 RX ADMIN — MIDAZOLAM 2 MG: 1 INJECTION INTRAMUSCULAR; INTRAVENOUS at 13:10

## 2019-06-10 NOTE — H&P
"INTERVAL HISTORY:    The patient returns for another Lumbar epidural steroid injection today.  They have received 30% improvement since their last injection with a pain level of 5/10 at its worst recently.    Conservative measures tried in the interim Tylenol and daily walking    Current Outpatient Medications on File Prior to Encounter   Medication Sig Dispense Refill   • acetaminophen (TYLENOL) 500 MG tablet Take 500 mg by mouth Daily. PT STATES HE TAKES 3 TABLETS IN THE AM     • atorvastatin (LIPITOR) 10 MG tablet TAKE 1 TABLET BY MOUTH DAILY 90 tablet 1   • diphenhydrAMINE-acetaminophen (TYLENOL PM)  MG tablet per tablet Take 2 tablets by mouth At Night As Needed for Sleep.     • hydrALAZINE (APRESOLINE) 25 MG tablet TAKE 1 TABLET BY MOUTH TWICE DAILY 180 tablet 1   • hydrochlorothiazide (HYDRODIURIL) 25 MG tablet TAKE 1 TABLET BY MOUTH TWICE DAILY 180 tablet 1   • metoprolol succinate XL (TOPROL-XL) 25 MG 24 hr tablet TAKE 1 TABLET BY MOUTH DAILY 90 tablet 1   • potassium chloride (K-DUR) 10 MEQ CR tablet TAKE 3 TABLETS BY MOUTH TWICE DAILY 540 tablet 1   • ramipril (ALTACE) 10 MG capsule Take 1 capsule by mouth 2 (Two) Times a Day. 180 capsule 1   • triamcinolone (KENALOG) 0.1 % ointment Apply  topically to the appropriate area as directed 2 (Two) Times a Day. 80 g 0     No current facility-administered medications on file prior to encounter.        Past Medical History:   Diagnosis Date   • Arthritis    • Depression    • Hyperlipidemia    • Hypertension    • Injury of back     Back surgery 1984 partial discectomy with \"sciatic nerve damage\"   • Low back pain        No hematologic infectious or constitutional symptoms  Negative patient screen for ISAAC      Exam:  There were no vitals taken for this visit.  Airway Mallampatti 2  Alert and oriented      Diagnosis:  Post-Op Diagnosis Codes:     * Spinal stenosis of lumbar region with neurogenic claudication [M48.062]    Plan:  Lumbar 2 epidural steroid " injection under fluoroscopic guidance    I have encouraged them to continue:  1.  Physical therapy exercises at home as prescribed by physical therapy or from the pain clinic handout (given to the patient).  Continuation of these exercises every day, or multiple times per week, even when the patient has good pain relief, was stressed to the patient as a preventative measure to decrease the frequency and severity of future pain episodes.  2.  Continue pain medicines as already prescribed.  If patient not currently taking any, it is recommended to begin Acetaminophen 1000 mg po q 8 hours.  If other medicines containing Acetaminophen are currently prescribed, maintain daily dose at 3000mg.    3.  If they can tolerate NSAIDS, it is recommended to take Ibuprofen 600 mg po q 6 hours for 7 days during pain exacerbations.   Alternatively, they may substitute an NSAID of their choice (e.g. Aleve)  4.  Heat and ice to the affected area as tolerated for pain control.  It was discussed that heating pads can cause burns.  5.  Low impact exercise such as walking or water exercise was recommended to maintain overall health and aid in weight control.   6.  Follow up as needed for subsequent injections.  7.  Patient was counseled to abstain from tobacco products.

## 2019-06-10 NOTE — ANESTHESIA PROCEDURE NOTES
PAIN Epidural block      Patient reassessed immediately prior to procedure    Patient location during procedure: pain clinic  Start Time: 6/10/2019 1:07 PM  Stop Time: 6/10/2019 1:26 PM  Indication:procedure for pain  Performed By  Anesthesiologist: Dewey Liu MD  Preanesthetic Checklist  Completed: patient identified and risks and benefits discussed  Additional Notes  Diagnosis:     Post-Op Diagnosis Codes:     * Spinal stenosis of lumbar region with neurogenic claudication (M48.062)    Sedation: Versed 2 mg and fentanyl 50 mcg    A lumbar epidural steroid injection with fluoroscopic guidance and an Isovue dye epidurogram was performed.  Under fluoroscopic guidance, the epidural space was identified and accessed, confirmed by loss of resistance to saline followed by injection of Isovue dye, which shows a good epidurogram.  The above medications were injected uneventfully.    My first attempt was at the L to interspace.  I attempted both sides but secondary to bony interference was unable to access the area.  I then changed to the L5 area which appeared to be the most open space.    Prep:  Pt Position:prone  Sterile Tech:cap, gloves, mask and sterile barrier  Prep:chlorhexidine gluconate and isopropyl alcohol  Monitoring:blood pressure monitoring, continuous pulse oximetry and EKG  Procedure:Sedation: yes     Approach:right paramedian  Guidance: fluoroscopy  Location:lumbar  Interspace: L5-S1.  Needle Type:Tuohy  Needle Gauge:20  Aspiration:negative  Medications:  Depomedrol:80  Preservative Free Saline:1mL  Isovue:1mL  Comments:Isovue dye reveals good epidurogram  Post Assessment:  Pt Tolerance:patient tolerated the procedure well with no apparent complications  Complications:no

## 2019-07-22 RX ORDER — METOPROLOL SUCCINATE 25 MG/1
25 TABLET, EXTENDED RELEASE ORAL DAILY
Qty: 90 TABLET | Refills: 1 | Status: SHIPPED | OUTPATIENT
Start: 2019-07-22 | End: 2020-01-20

## 2019-07-23 ENCOUNTER — ANESTHESIA EVENT (OUTPATIENT)
Dept: PAIN MEDICINE | Facility: HOSPITAL | Age: 79
End: 2019-07-23

## 2019-07-23 ENCOUNTER — HOSPITAL ENCOUNTER (OUTPATIENT)
Dept: PAIN MEDICINE | Facility: HOSPITAL | Age: 79
Discharge: HOME | End: 2019-07-23
Admitting: ANESTHESIOLOGY

## 2019-07-23 ENCOUNTER — HOSPITAL ENCOUNTER (OUTPATIENT)
Dept: GENERAL RADIOLOGY | Facility: HOSPITAL | Age: 79
Discharge: HOME | End: 2019-07-23

## 2019-07-23 ENCOUNTER — ANESTHESIA (OUTPATIENT)
Dept: PAIN MEDICINE | Facility: HOSPITAL | Age: 79
End: 2019-07-23

## 2019-07-23 VITALS
OXYGEN SATURATION: 96 % | HEART RATE: 73 BPM | SYSTOLIC BLOOD PRESSURE: 136 MMHG | RESPIRATION RATE: 16 BRPM | DIASTOLIC BLOOD PRESSURE: 80 MMHG | TEMPERATURE: 97.7 F

## 2019-07-23 DIAGNOSIS — R52 PAIN: ICD-10-CM

## 2019-07-23 DIAGNOSIS — M48.062 SPINAL STENOSIS OF LUMBAR REGION WITH NEUROGENIC CLAUDICATION: Primary | ICD-10-CM

## 2019-07-23 PROCEDURE — 77003 FLUOROGUIDE FOR SPINE INJECT: CPT

## 2019-07-23 PROCEDURE — C1755 CATHETER, INTRASPINAL: HCPCS

## 2019-07-23 PROCEDURE — 0 IOPAMIDOL 41 % SOLUTION: Performed by: ANESTHESIOLOGY

## 2019-07-23 PROCEDURE — 25010000002 FENTANYL CITRATE (PF) 100 MCG/2ML SOLUTION: Performed by: ANESTHESIOLOGY

## 2019-07-23 PROCEDURE — 25010000002 METHYLPREDNISOLONE PER 80 MG: Performed by: ANESTHESIOLOGY

## 2019-07-23 PROCEDURE — 25010000002 MIDAZOLAM PER 1 MG: Performed by: ANESTHESIOLOGY

## 2019-07-23 RX ORDER — SODIUM CHLORIDE 0.9 % (FLUSH) 0.9 %
3-10 SYRINGE (ML) INJECTION AS NEEDED
Status: DISCONTINUED | OUTPATIENT
Start: 2019-07-23 | End: 2019-07-24 | Stop reason: HOSPADM

## 2019-07-23 RX ORDER — FENTANYL CITRATE 50 UG/ML
50 INJECTION, SOLUTION INTRAMUSCULAR; INTRAVENOUS AS NEEDED
Status: DISCONTINUED | OUTPATIENT
Start: 2019-07-23 | End: 2019-07-24 | Stop reason: HOSPADM

## 2019-07-23 RX ORDER — METHYLPREDNISOLONE ACETATE 80 MG/ML
80 INJECTION, SUSPENSION INTRA-ARTICULAR; INTRALESIONAL; INTRAMUSCULAR; SOFT TISSUE ONCE
Status: COMPLETED | OUTPATIENT
Start: 2019-07-23 | End: 2019-07-23

## 2019-07-23 RX ORDER — SODIUM CHLORIDE 0.9 % (FLUSH) 0.9 %
3 SYRINGE (ML) INJECTION EVERY 12 HOURS SCHEDULED
Status: DISCONTINUED | OUTPATIENT
Start: 2019-07-23 | End: 2019-07-24 | Stop reason: HOSPADM

## 2019-07-23 RX ORDER — LIDOCAINE HYDROCHLORIDE 10 MG/ML
1 INJECTION, SOLUTION INFILTRATION; PERINEURAL ONCE
Status: DISCONTINUED | OUTPATIENT
Start: 2019-07-23 | End: 2019-07-24 | Stop reason: HOSPADM

## 2019-07-23 RX ORDER — MIDAZOLAM HYDROCHLORIDE 1 MG/ML
1 INJECTION INTRAMUSCULAR; INTRAVENOUS
Status: DISCONTINUED | OUTPATIENT
Start: 2019-07-23 | End: 2019-07-24 | Stop reason: HOSPADM

## 2019-07-23 RX ADMIN — IOPAMIDOL 10 ML: 408 INJECTION, SOLUTION INTRATHECAL at 10:48

## 2019-07-23 RX ADMIN — MIDAZOLAM 2 MG: 1 INJECTION INTRAMUSCULAR; INTRAVENOUS at 10:42

## 2019-07-23 RX ADMIN — FENTANYL CITRATE 50 MCG: 50 INJECTION, SOLUTION INTRAMUSCULAR; INTRAVENOUS at 10:43

## 2019-07-23 RX ADMIN — METHYLPREDNISOLONE ACETATE 80 MG: 80 INJECTION, SUSPENSION INTRA-ARTICULAR; INTRALESIONAL; INTRAMUSCULAR; SOFT TISSUE at 10:48

## 2019-07-23 NOTE — H&P
"[facility]    History and Physical    Patient Name: Olu Izquierdo  :  1940  MRN:  [id number]  Date of Admission: 2019    Subjective     Patient is a 78 y.o. male presents with chief complaint of chronic, intermitent, moderate low back and leg: bilateral pain.  Onset of symptoms was gradual starting several months ago.  Symptoms are associated/aggravated by activity or twisting. Symptoms improve with rest    The following portions of the patients history were reviewed and updated as appropriate: current medications, allergies, past medical history, past surgical history, past family history, past social history and problem list                Objective     Past Medical History:   Past Medical History:   Diagnosis Date   • Arthritis    • Depression    • Hyperlipidemia    • Hypertension    • Injury of back     Back surgery 1984 partial discectomy with \"sciatic nerve damage\"   • Low back pain      Past Surgical History:   Past Surgical History:   Procedure Laterality Date   • BACK SURGERY     • CATARACT EXTRACTION, BILATERAL     • EPIDURAL BLOCK       Family History:   Family History   Problem Relation Age of Onset   • Heart disease Mother    • Hypertension Mother    • Alcohol abuse Father      Social History:   Social History     Tobacco Use   • Smoking status: Never Smoker   • Smokeless tobacco: Never Used   Substance Use Topics   • Alcohol use: Yes     Comment: 1 a night    • Drug use: No       Vital Signs Range for the last 24 hours  Temperature:     Temp Source:     BP:     Pulse:     Respirations:     SPO2:     O2 Amount (l/min):     O2 Devices     Weight:       Flowsheet Rows      First Filed Value   Admission Height  182.9 cm (72\") Documented at 2019 1239   Admission Weight  90.7 kg (200 lb) Documented at 2019 1239          --------------------------------------------------------------------------------    Current Outpatient Medications   Medication Sig Dispense Refill   • " acetaminophen (TYLENOL) 500 MG tablet Take 500 mg by mouth Daily. PT STATES HE TAKES 3 TABLETS IN THE AM     • atorvastatin (LIPITOR) 10 MG tablet TAKE 1 TABLET BY MOUTH DAILY 90 tablet 1   • diphenhydrAMINE-acetaminophen (TYLENOL PM)  MG tablet per tablet Take 2 tablets by mouth At Night As Needed for Sleep.     • hydrALAZINE (APRESOLINE) 25 MG tablet TAKE 1 TABLET BY MOUTH TWICE DAILY 180 tablet 1   • hydrochlorothiazide (HYDRODIURIL) 25 MG tablet TAKE 1 TABLET BY MOUTH TWICE DAILY 180 tablet 1   • metoprolol succinate XL (TOPROL-XL) 25 MG 24 hr tablet TAKE 1 TABLET BY MOUTH DAILY 90 tablet 1   • potassium chloride (K-DUR) 10 MEQ CR tablet TAKE 3 TABLETS BY MOUTH TWICE DAILY 540 tablet 1   • ramipril (ALTACE) 10 MG capsule Take 1 capsule by mouth 2 (Two) Times a Day. 180 capsule 1     No current facility-administered medications for this encounter.        --------------------------------------------------------------------------------  Assessment/Plan      Anesthesia Evaluation     Patient summary reviewed and Nursing notes reviewed   NPO Solid Status: > 8 hours  NPO Liquid Status: > 2 hours    Pain impairs ability to perform ADLs: Sleeping  Modalities previously tried to control pain with limited effectiveness within the last 4-6 weeks: Rest     Airway   Mallampati: II  TM distance: >3 FB  Neck ROM: full  Dental - normal exam     Pulmonary - negative pulmonary ROS and normal exam   Cardiovascular - normal exam    (+) hypertension, hyperlipidemia,       Neuro/Psych- neuro exam normal  (+) psychiatric history Depression,     GI/Hepatic/Renal/Endo - negative ROS     Musculoskeletal (-) normal exam    (+) back pain, radiculopathy  Abdominal  - normal exam   Substance History - negative use     OB/GYN negative ob/gyn ROS         Other                   Diagnosis and Plan      Treatment Plan  ASA 3   Patient has had previous injection/procedure with 25-50% improvement.   Procedures: Lumbar Epidural Steroid  Injection(LESI), With fluoroscopy,       Anesthetic plan and risks discussed with patient.          Diagnosis     * Lumbar degenerative disc disease [M51.36]     * Lumbar radiculopathy [M54.16]

## 2019-07-23 NOTE — ANESTHESIA PROCEDURE NOTES
PAIN Epidural block    Pre-sedation assessment completed: 7/23/2019 10:38 AM    Patient reassessed immediately prior to procedure    Patient location during procedure: pain clinic  Start Time: 7/23/2019 10:38 AM  Stop Time: 7/23/2019 10:53 AM  Indication:procedure for pain  Performed By  Anesthesiologist: Blaise Leone MD  Preanesthetic Checklist  Completed: patient identified, site marked, surgical consent, pre-op evaluation, timeout performed, risks and benefits discussed and monitors and equipment checked  Additional Notes  Depomedrol - 80mg    Needle position confirmed by fluoroscopy and epidurogram using 2cc of [id number].    Diagnosis  Post-Op Diagnosis Codes:     * Lumbar degenerative disc disease (M51.36)     * Lumbar radiculopathy (M54.16)    Prep:  Pt Position:prone  Sterile Tech:cap, gloves, mask and sterile barrier  Prep:chlorhexidine gluconate and isopropyl alcohol  Monitoring:blood pressure monitoring, continuous pulse oximetry and EKG  Procedure:Sedation: yes     Approach:left paramedian  Guidance: fluoroscopy  Location:lumbar  Level:2-3  Needle Type:Tuohy  Needle Gauge:20  Aspiration:negative  Medications:  Depomedrol:80 mg  Preservative Free Saline:2mL  Isovue:2mL    Post Assessment:  Post-procedure: bandaide.  Pt Tolerance:patient tolerated the procedure well with no apparent complications  Complications:no

## 2019-07-26 ENCOUNTER — OFFICE VISIT (OUTPATIENT)
Dept: FAMILY MEDICINE CLINIC | Facility: CLINIC | Age: 79
End: 2019-07-26

## 2019-07-26 VITALS
SYSTOLIC BLOOD PRESSURE: 144 MMHG | OXYGEN SATURATION: 97 % | TEMPERATURE: 99.8 F | DIASTOLIC BLOOD PRESSURE: 77 MMHG | HEIGHT: 72 IN | WEIGHT: 205.5 LBS | HEART RATE: 109 BPM | BODY MASS INDEX: 27.83 KG/M2

## 2019-07-26 DIAGNOSIS — N41.0 ACUTE PROSTATITIS: Primary | ICD-10-CM

## 2019-07-26 PROCEDURE — 96372 THER/PROPH/DIAG INJ SC/IM: CPT | Performed by: FAMILY MEDICINE

## 2019-07-26 PROCEDURE — 99214 OFFICE O/P EST MOD 30 MIN: CPT | Performed by: FAMILY MEDICINE

## 2019-07-26 RX ORDER — CEFTRIAXONE 1 G/1
1 INJECTION, POWDER, FOR SOLUTION INTRAMUSCULAR; INTRAVENOUS ONCE
Status: COMPLETED | OUTPATIENT
Start: 2019-07-26 | End: 2019-07-26

## 2019-07-26 RX ADMIN — CEFTRIAXONE 1 G: 1 INJECTION, POWDER, FOR SOLUTION INTRAMUSCULAR; INTRAVENOUS at 15:17

## 2019-07-26 NOTE — PROGRESS NOTES
"Aba Izquierdo is a 78 y.o. male.     Chief Complaint   Patient presents with   • Headache     x yesterday went to the  yesterday   • Generalized Body Aches   • Urinary Incontinence        History of Present Illness    Just over 24 hours of urinary frequency and some burning.  Feeling a little run down and tired and slight headache.  He had a little bit of blood in the urethra yesterday.  He felt fine driving here.  No lightheadedness or dizziness.  He feels a little bit feverish but no severe shaking chills.  He was seen in the urgent care center yesterday.  Urinalysis revealed evidence of a UTI.  They sent a urine culture.  He was given Rocephin 1 g IM and given Bactrim.  He has started the Bactrim x2 now.  He presents here for follow-up.  He got some sleep last night but did have some frequent urination at times.  He continues to urinate every 40 minutes or so in moderate amounts.  He is able to start his stream no trouble but has some trouble finishing it.  He has had multiple prostate infections in the past.  Last was a number of months ago.      The following portions of the patient's history were reviewed and updated as appropriate: allergies, current medications, past family history, past medical history, past social history, past surgical history and problem list.          Review of Systems   Constitutional: Positive for chills. Negative for diaphoresis and fever.   HENT: Negative.    Respiratory: Negative.    Cardiovascular: Negative.    Gastrointestinal: Negative.    Genitourinary: Positive for difficulty urinating, dysuria, frequency and urgency. Negative for flank pain, hematuria and testicular pain.       Objective   Blood pressure 144/77, pulse 109, temperature 99.8 °F (37.7 °C), temperature source Oral, height 182.9 cm (72.01\"), weight 93.2 kg (205 lb 8 oz), SpO2 97 %.  Physical Exam   Constitutional:   Patient appears tired but nontoxic   HENT:   Head: Atraumatic.   Eyes: Conjunctivae " are normal.   Neck: Normal range of motion. Neck supple.   Cardiovascular: Regular rhythm.   Mild tachycardia.  Pulse 109   Pulmonary/Chest: Effort normal.   Abdominal: Soft. Bowel sounds are normal. He exhibits no distension. There is tenderness.   Minimal left mid quadrant pain to palpation.  No rebound.  No guarding.  No palpable mass   Genitourinary:   Genitourinary Comments: Prostate +3 size.  Boggy.  Slightly tender       Assessment/Plan   Olu was seen today for headache, generalized body aches and urinary incontinence.    Diagnoses and all orders for this visit:    Acute prostatitis  -     cefTRIAXone (ROCEPHIN) injection 1 g    Acute prostatitis with systemic symptoms.  At this time no evidence of toxicity.  However I am concerned about him getting sicker this weekend.  We discussed whether or not he should be hospitalized.  At this time no absolute indication for hospitalization, but things could change fast this weekend.  Patient is aware of this.  He states he does not want to be hospitalized.  He is able to drink plenty of fluids and able to urinate.  He is able to tolerate the Bactrim.  I am recommending continuing the Bactrim.  Await urine culture.  1 g of Rocephin IM was given today.  He knows to go directly to the emergency room this weekend with severe shaking chills, high fever, or otherwise feeling ill.  We will await culture report this weekend.  He will call with questions.  Hold hydrochlorothiazide for now.

## 2019-07-26 NOTE — PATIENT INSTRUCTIONS
We are going to continue to treat you for this prostate infection, possible early kidney infection.  Continue the sulfamethoxazole trimethoprim antibiotic.  We also gave you another injection of Rocephin or ceftriaxone today.  We are awaiting the urine culture report.  Continue drinking plenty of fluids.  If you feel very ill this weekend such as high fever or severe shaking chills or other severe symptoms, go directly to the emergency room.  Also call with questions.  Stop hydrochlorothiazide this weekend

## 2019-07-29 ENCOUNTER — TELEPHONE (OUTPATIENT)
Dept: FAMILY MEDICINE CLINIC | Facility: CLINIC | Age: 79
End: 2019-07-29

## 2019-08-22 ENCOUNTER — OFFICE VISIT (OUTPATIENT)
Dept: FAMILY MEDICINE CLINIC | Facility: CLINIC | Age: 79
End: 2019-08-22

## 2019-08-22 VITALS
TEMPERATURE: 98.7 F | DIASTOLIC BLOOD PRESSURE: 82 MMHG | SYSTOLIC BLOOD PRESSURE: 162 MMHG | WEIGHT: 203.2 LBS | HEIGHT: 72 IN | BODY MASS INDEX: 27.52 KG/M2 | OXYGEN SATURATION: 97 % | HEART RATE: 90 BPM

## 2019-08-22 DIAGNOSIS — R35.0 FREQUENT URINATION: ICD-10-CM

## 2019-08-22 DIAGNOSIS — N30.90 CYSTITIS: Primary | ICD-10-CM

## 2019-08-22 LAB
BILIRUB BLD-MCNC: NEGATIVE MG/DL
CLARITY, POC: ABNORMAL
COLOR UR: YELLOW
GLUCOSE UR STRIP-MCNC: NEGATIVE MG/DL
KETONES UR QL: ABNORMAL
LEUKOCYTE EST, POC: ABNORMAL
NITRITE UR-MCNC: NEGATIVE MG/ML
PH UR: 5 [PH] (ref 5–8)
PROT UR STRIP-MCNC: ABNORMAL MG/DL
RBC # UR STRIP: ABNORMAL /UL
SP GR UR: 1.03 (ref 1–1.03)
UROBILINOGEN UR QL: NORMAL

## 2019-08-22 PROCEDURE — 81003 URINALYSIS AUTO W/O SCOPE: CPT | Performed by: FAMILY MEDICINE

## 2019-08-22 PROCEDURE — 99213 OFFICE O/P EST LOW 20 MIN: CPT | Performed by: FAMILY MEDICINE

## 2019-08-22 RX ORDER — PHENAZOPYRIDINE HYDROCHLORIDE 200 MG/1
200 TABLET, FILM COATED ORAL 3 TIMES DAILY PRN
Qty: 21 TABLET | Refills: 0 | Status: SHIPPED | OUTPATIENT
Start: 2019-08-22 | End: 2019-10-03

## 2019-08-22 RX ORDER — SULFAMETHOXAZOLE AND TRIMETHOPRIM 800; 160 MG/1; MG/1
1 TABLET ORAL 2 TIMES DAILY
Qty: 14 TABLET | Refills: 0 | Status: SHIPPED | OUTPATIENT
Start: 2019-08-22 | End: 2019-10-03

## 2019-08-22 NOTE — PROGRESS NOTES
"Subjective   Olu Izquierdo is a 78 y.o. male.     Chief Complaint   Patient presents with   • Dysuria     x last night    • Urinary Frequency        History of Present Illness    12 hours.  Sudden onset of urinary frequency and urgency.  Decreased volume.  Some burning.  Last month he was diagnosed with UTI, treated.  Klebsiella sensitive to Bactrim partially resistant to nitrofurantoin and sensitive to Cipro.  He otherwise feels okay.  The symptoms are moderate but getting worse.  Sudden onset.  He has had no fevers or chills no abdominal pain.  No hematuria.    Hypertension.  We held the hydrochlorothiazide because of his lower urinary tract symptoms.  He still not taking it.  Blood pressure was normal the anesthesiologist clinic, slightly high today.  He is not checking home.    The following portions of the patient's history were reviewed and updated as appropriate: allergies, current medications, past family history, past medical history, past social history, past surgical history and problem list.          Review of Systems   Constitutional: Negative.    Respiratory: Negative.    Cardiovascular: Negative.    Genitourinary: Positive for dysuria and urgency. Negative for hematuria.       Objective   Blood pressure 162/82, pulse 90, temperature 98.7 °F (37.1 °C), temperature source Oral, height 182.9 cm (72.01\"), weight 92.2 kg (203 lb 3.2 oz), SpO2 97 %.  Physical Exam   Constitutional: No distress.   HENT:   Head: Atraumatic.   Cardiovascular: Normal rate.   Pulmonary/Chest: Effort normal.   Abdominal: Soft. He exhibits no distension. There is no tenderness.   No CVA tenderness to percussion   Skin: He is not diaphoretic.       Assessment/Plan   Olu was seen today for dysuria and urinary frequency.    Diagnoses and all orders for this visit:    Cystitis    Frequent urination  -     POC Urinalysis Dipstick, Automated  -     Urine Culture - Urine, Urine, Clean Catch    Other orders  -     " sulfamethoxazole-trimethoprim (BACTRIM DS) 800-160 MG per tablet; Take 1 tablet by mouth 2 (Two) Times a Day.  -     phenazopyridine (PYRIDIUM) 200 MG tablet; Take 1 tablet by mouth 3 (Three) Times a Day As Needed for bladder spasms.        Lower urinary tract symptoms.  Probable acute and recurrent cystitis.  Bactrim twice daily for 7 days.  Pyridium as needed.  Send urine culture.  With worsening symptoms seek medical attention immediately.  He will call if he has questions.  If symptoms persist, referral to urology may be considered also.    Hypertension.  Fair control.  He is going be checking blood pressures at home and sending us readings.  I will see him back in 6 weeks for blood pressure recheck sooner as needed

## 2019-08-25 LAB
BACTERIA UR CULT: ABNORMAL
BACTERIA UR CULT: ABNORMAL
OTHER ANTIBIOTIC SUSC ISLT: ABNORMAL

## 2019-09-03 RX ORDER — HYDRALAZINE HYDROCHLORIDE 25 MG/1
TABLET, FILM COATED ORAL
Qty: 180 TABLET | Refills: 1 | Status: SHIPPED | OUTPATIENT
Start: 2019-09-03 | End: 2019-10-25

## 2019-10-01 ENCOUNTER — OFFICE VISIT (OUTPATIENT)
Dept: NEUROSURGERY | Facility: CLINIC | Age: 79
End: 2019-10-01

## 2019-10-01 VITALS — DIASTOLIC BLOOD PRESSURE: 82 MMHG | HEART RATE: 92 BPM | SYSTOLIC BLOOD PRESSURE: 153 MMHG

## 2019-10-01 DIAGNOSIS — M48.062 SPINAL STENOSIS OF LUMBAR REGION WITH NEUROGENIC CLAUDICATION: Primary | ICD-10-CM

## 2019-10-01 PROCEDURE — 99214 OFFICE O/P EST MOD 30 MIN: CPT | Performed by: NEUROLOGICAL SURGERY

## 2019-10-01 RX ORDER — DEXAMETHASONE 4 MG/1
8 TABLET ORAL TAKE AS DIRECTED
Qty: 2 TABLET | Refills: 0 | Status: SHIPPED | OUTPATIENT
Start: 2019-10-01 | End: 2019-10-10 | Stop reason: HOSPADM

## 2019-10-03 ENCOUNTER — OFFICE VISIT (OUTPATIENT)
Dept: FAMILY MEDICINE CLINIC | Facility: CLINIC | Age: 79
End: 2019-10-03

## 2019-10-03 VITALS
SYSTOLIC BLOOD PRESSURE: 131 MMHG | TEMPERATURE: 97.1 F | BODY MASS INDEX: 27.45 KG/M2 | HEART RATE: 80 BPM | WEIGHT: 202.7 LBS | OXYGEN SATURATION: 98 % | DIASTOLIC BLOOD PRESSURE: 73 MMHG | HEIGHT: 72 IN

## 2019-10-03 DIAGNOSIS — Z23 NEED FOR IMMUNIZATION AGAINST INFLUENZA: ICD-10-CM

## 2019-10-03 DIAGNOSIS — E87.6 HYPOKALEMIA: ICD-10-CM

## 2019-10-03 DIAGNOSIS — I10 ESSENTIAL HYPERTENSION: Primary | ICD-10-CM

## 2019-10-03 PROCEDURE — G0008 ADMIN INFLUENZA VIRUS VAC: HCPCS | Performed by: FAMILY MEDICINE

## 2019-10-03 PROCEDURE — 90653 IIV ADJUVANT VACCINE IM: CPT | Performed by: FAMILY MEDICINE

## 2019-10-03 PROCEDURE — 99213 OFFICE O/P EST LOW 20 MIN: CPT | Performed by: FAMILY MEDICINE

## 2019-10-03 RX ORDER — HYDROCHLOROTHIAZIDE 25 MG/1
25 TABLET ORAL 2 TIMES DAILY
Qty: 180 TABLET | Refills: 1 | Status: CANCELLED | OUTPATIENT
Start: 2019-10-03

## 2019-10-03 RX ORDER — SPIRONOLACTONE 50 MG/1
50 TABLET, FILM COATED ORAL DAILY
Qty: 90 TABLET | Refills: 1 | Status: SHIPPED | OUTPATIENT
Start: 2019-10-03 | End: 2020-03-25

## 2019-10-03 NOTE — PROGRESS NOTES
"Subjective   Olu Iqzuierdo is a 79 y.o. male.     Chief Complaint   Patient presents with   • Hypertension     pt has started back on his HCTZ 25 mg bid , if you want him to keep taking will need refill   • Back Pain        History of Present Illness    Follow-up hypertension.  Resistant.  He is been on hydralazine, metoprolol, Altace, and hydrochlorothiazide at high dose for some time.  His potassium runs low.  Prior to starting medicine.  He 12 years ago had hypokalemia has been on 60 mEq of potassium chloride daily in addition to his 50 mg of hydrochlorothiazide.  Aldosterone level was normal last year.  His blood pressures here today is fine but at home the running on average about 150.  As high as 160 systolic.  Otherwise feels well.  He is having ongoing back pain and scheduled for a myelogram next week.  BMP 4 months ago demonstrate normal creatinine normal potassium.      The following portions of the patient's history were reviewed and updated as appropriate: allergies, current medications, past family history, past medical history, past social history, past surgical history and problem list.          Review of Systems   Constitutional: Negative.    Respiratory: Negative.    Cardiovascular: Negative.    Musculoskeletal: Positive for back pain.   Neurological: Negative for headaches.       Objective   Blood pressure 131/73, pulse 80, temperature 97.1 °F (36.2 °C), temperature source Oral, height 182.9 cm (72.01\"), weight 91.9 kg (202 lb 11.2 oz), SpO2 98 %.  Physical Exam   Constitutional: He is oriented to person, place, and time. He appears well-developed and well-nourished. No distress.   HENT:   Head: Atraumatic.   Cardiovascular: Normal rate, regular rhythm, normal heart sounds and intact distal pulses.   Pulmonary/Chest: Effort normal and breath sounds normal.   Musculoskeletal: He exhibits no edema.   Neurological: He is alert and oriented to person, place, and time. Coordination normal.   Skin: Skin " is warm and dry.   Psychiatric: He has a normal mood and affect. His behavior is normal. Judgment and thought content normal.   Nursing note and vitals reviewed.      Assessment/Plan   Olu was seen today for hypertension and back pain.    Diagnoses and all orders for this visit:    Essential hypertension    Hypokalemia  -     Basic Metabolic Panel  -     Magnesium  -     Basic Metabolic Panel; Future    Other orders  -     Cancel: hydroCHLOROthiazide (HYDRODIURIL) 25 MG tablet; Take 1 tablet by mouth 2 (Two) Times a Day.  -     spironolactone (ALDACTONE) 50 MG tablet; Take 1 tablet by mouth Daily.        Hypertension.  Not controlled.  Previous hypokalemia on ACE inhibitor, thiazide diuretic, requiring high doses of potassium supplementation.  Aldosterone level was 10 last year.  Renin was not high.  At this time I want to use a potassium sparing diuretic.  He is going to continue the ACE inhibitor, continue the hydralazine and the metoprolol.  Stop the potassium supplementation for now.  Stop hydrochlorothiazide 50 mg a day.  Start spironolactone 50 mg a day.  Check BMP today.  Check BMP next week.  See me in 3 weeks.  Continue to monitor blood pressure at home.  Questionable low-level hyperaldosteronism.  Offered nephrology consultation.  Patient declined.  However if things are not improving I will need to get him to a specialist.

## 2019-10-04 LAB
BUN SERPL-MCNC: 18 MG/DL (ref 8–23)
BUN/CREAT SERPL: 17.3 (ref 7–25)
CALCIUM SERPL-MCNC: 10 MG/DL (ref 8.6–10.5)
CHLORIDE SERPL-SCNC: 100 MMOL/L (ref 98–107)
CO2 SERPL-SCNC: 28.4 MMOL/L (ref 22–29)
CREAT SERPL-MCNC: 1.04 MG/DL (ref 0.76–1.27)
GLUCOSE SERPL-MCNC: 108 MG/DL (ref 65–99)
MAGNESIUM SERPL-MCNC: 1.9 MG/DL (ref 1.6–2.4)
POTASSIUM SERPL-SCNC: 4.3 MMOL/L (ref 3.5–5.2)
SODIUM SERPL-SCNC: 140 MMOL/L (ref 136–145)

## 2019-10-04 NOTE — PROGRESS NOTES
The potassium and other blood chemistries are normal.  Continue current plan.  Repeat BMP next week.

## 2019-10-08 ENCOUNTER — RESULTS ENCOUNTER (OUTPATIENT)
Dept: FAMILY MEDICINE CLINIC | Facility: CLINIC | Age: 79
End: 2019-10-08

## 2019-10-08 DIAGNOSIS — E87.6 HYPOKALEMIA: ICD-10-CM

## 2019-10-09 LAB
BUN SERPL-MCNC: 15 MG/DL (ref 8–23)
BUN/CREAT SERPL: 15.6 (ref 7–25)
CALCIUM SERPL-MCNC: 9.5 MG/DL (ref 8.6–10.5)
CHLORIDE SERPL-SCNC: 102 MMOL/L (ref 98–107)
CO2 SERPL-SCNC: 29 MMOL/L (ref 22–29)
CREAT SERPL-MCNC: 0.96 MG/DL (ref 0.76–1.27)
GLUCOSE SERPL-MCNC: 97 MG/DL (ref 65–99)
POTASSIUM SERPL-SCNC: 4.7 MMOL/L (ref 3.5–5.2)
SODIUM SERPL-SCNC: 143 MMOL/L (ref 136–145)

## 2019-10-10 ENCOUNTER — HOSPITAL ENCOUNTER (OUTPATIENT)
Dept: GENERAL RADIOLOGY | Facility: HOSPITAL | Age: 79
Discharge: HOME | End: 2019-10-10

## 2019-10-10 ENCOUNTER — HOSPITAL ENCOUNTER (OUTPATIENT)
Dept: CT IMAGING | Facility: HOSPITAL | Age: 79
Discharge: HOME | End: 2019-10-10
Admitting: NEUROLOGICAL SURGERY

## 2019-10-10 VITALS
RESPIRATION RATE: 20 BRPM | BODY MASS INDEX: 27.09 KG/M2 | SYSTOLIC BLOOD PRESSURE: 124 MMHG | HEART RATE: 73 BPM | HEIGHT: 72 IN | DIASTOLIC BLOOD PRESSURE: 73 MMHG | TEMPERATURE: 98.3 F | WEIGHT: 200 LBS | OXYGEN SATURATION: 97 %

## 2019-10-10 DIAGNOSIS — M48.062 SPINAL STENOSIS OF LUMBAR REGION WITH NEUROGENIC CLAUDICATION: ICD-10-CM

## 2019-10-10 PROCEDURE — 72132 CT LUMBAR SPINE W/DYE: CPT

## 2019-10-10 PROCEDURE — 25010000003 LIDOCAINE 1 % SOLUTION: Performed by: NEUROLOGICAL SURGERY

## 2019-10-10 PROCEDURE — 72240 MYELOGRAPHY NECK SPINE: CPT

## 2019-10-10 PROCEDURE — 62304 MYELOGRAPHY LUMBAR INJECTION: CPT

## 2019-10-10 PROCEDURE — 0 IOPAMIDOL 41 % SOLUTION: Performed by: NEUROLOGICAL SURGERY

## 2019-10-10 PROCEDURE — 72114 X-RAY EXAM L-S SPINE BENDING: CPT

## 2019-10-10 RX ORDER — HYDROCODONE BITARTRATE AND ACETAMINOPHEN 5; 325 MG/1; MG/1
1 TABLET ORAL EVERY 4 HOURS PRN
Status: DISCONTINUED | OUTPATIENT
Start: 2019-10-10 | End: 2019-10-11 | Stop reason: HOSPADM

## 2019-10-10 RX ORDER — ACETAMINOPHEN 325 MG/1
650 TABLET ORAL EVERY 4 HOURS PRN
Status: DISCONTINUED | OUTPATIENT
Start: 2019-10-10 | End: 2019-10-11 | Stop reason: HOSPADM

## 2019-10-10 RX ORDER — LIDOCAINE HYDROCHLORIDE 10 MG/ML
10 INJECTION, SOLUTION INFILTRATION; PERINEURAL ONCE
Status: COMPLETED | OUTPATIENT
Start: 2019-10-10 | End: 2019-10-10

## 2019-10-10 RX ADMIN — IOPAMIDOL 20 ML: 408 INJECTION, SOLUTION INTRATHECAL at 07:28

## 2019-10-10 RX ADMIN — LIDOCAINE HYDROCHLORIDE 5 ML: 10 INJECTION, SOLUTION INFILTRATION; PERINEURAL at 07:27

## 2019-10-10 NOTE — DISCHARGE INSTRUCTIONS
EDUCATION /DISCHARGE INSTRUCTIONS:    A myelogram is a special radiology procedure of the spinal cord, spinal nerves and other related structures.  You will be awake during the examination.  An area of your lower back will be cleansed with an antiseptic solution.  The physician will inject a numbing medication in your lower back.  While your back is numb, a needle will be placed in the lower back area.  A small amount of spinal fluid may be withdrawn and sent to the lab if ordered by your physician. While the needle is in the back, an injection of a contrast material (xray dye) will be given through the needle.  The contrast material will allow the physician to see the spinal cord and spinal nerves.  Once injected, the needle will be removed and a band aid will be placed over the injection site.  The table will be tilted during the process to allow the contrast material to flow to particular areas in the spine.  Following the injection and xrays, you will be taken to the CT scan where more pictures will be taken. After the procedure is finished, the contrast material will be absorbed by your body and eliminated through your kidneys.  The radiologist will study and interpret your myelogram and send the results to your physician.  Procedure risks of a myelogram include, but are not limited to:  *  Bleeding   *  seizure  *  Infection   *  Headache, possibly severe requiring  *  Contrast reaction      a blood patch  *  Nerve or cord injury  *  Paralysis and death  Benefits of the procedure:  *  Best examination for delineating pathology related to spinal cord compression from a    disc and/or nerve root compression  Alternatives to the procedure:  MRI - a non invasive procedure requiring intravenous contrast injection.  Cannot be done on patients with certain pacemakers or metal in the body.  MRI risks include possible reaction to the contrast material, movement of metal located in the body.Benefit to MRI:  Non-invasive  and usually painless procedure.  THIS EDUCATION INFORMATION WAS REVIEWED PRIOR TO PROCEDURE AND CONSENT. Patient initials________________Time______0644____________    24 hour rest period ends __on 10/11/19 after 1030__________________.  Important information following your myelogram:  * ACTIVITY:   *  Lie down with your head elevated no more than 2 pillows high today & tonight  *  Sit up to eat your meals and use the restroom, otherwise, lie down.  *  Remain less active for two to three days.  *  Do not drive for 48 hours following a myelogram  *  You may remove the bandage and shower in the morning  *  Increase your fluids for the next 24 hours.  Caffeinated drinks are encouraged.      CALL YOUR PHYSICIAN FOR THE FOLLOWING:  * Pain at the injection site  * Reddness, swelling, bruising or drainage at the injection site.  * A fever by mouth of 101.0 or any new symptoms  Headaches are a common side effect after a myelogram.  If you get a headache, you should stay flat in bed and drink plenty of fluids. If the headache persist and does not go away with rest/medication, CALL Dr. Dudley at [phone]

## 2019-10-11 ENCOUNTER — TELEPHONE (OUTPATIENT)
Dept: INTERVENTIONAL RADIOLOGY/VASCULAR | Facility: HOSPITAL | Age: 79
End: 2019-10-11

## 2019-10-23 ENCOUNTER — OFFICE VISIT (OUTPATIENT)
Dept: FAMILY MEDICINE CLINIC | Facility: CLINIC | Age: 79
End: 2019-10-23

## 2019-10-23 VITALS
TEMPERATURE: 98.2 F | HEART RATE: 87 BPM | OXYGEN SATURATION: 98 % | HEIGHT: 72 IN | DIASTOLIC BLOOD PRESSURE: 80 MMHG | BODY MASS INDEX: 27.6 KG/M2 | SYSTOLIC BLOOD PRESSURE: 160 MMHG | WEIGHT: 203.8 LBS

## 2019-10-23 DIAGNOSIS — E87.6 HYPOKALEMIA: ICD-10-CM

## 2019-10-23 DIAGNOSIS — I10 ESSENTIAL HYPERTENSION: Primary | ICD-10-CM

## 2019-10-23 PROCEDURE — 99213 OFFICE O/P EST LOW 20 MIN: CPT | Performed by: FAMILY MEDICINE

## 2019-10-23 RX ORDER — HYDROCHLOROTHIAZIDE 12.5 MG/1
12.5 TABLET ORAL DAILY
Qty: 90 TABLET | Refills: 1 | Status: SHIPPED | OUTPATIENT
Start: 2019-10-23 | End: 2019-12-02

## 2019-10-23 NOTE — PROGRESS NOTES
"Subjective   Olu Izquierdo is a 79 y.o. male.     Chief Complaint   Patient presents with   • Hypertension        History of Present Illness    Long history of hypertension.  Partially resistant.  With ongoing hypokalemia.  Last visit we stopped the potassium supplementation and stopped the hydrochlorothiazide.  Started spironolactone 50 mg a day.  He is continue hydralazine 25 mg twice a day.  His BMP was normal couple weeks ago.  The potassium was 4.7.  Off potassium supplementation.  He notices no change in urination since stopping the hydrochlorothiazide 25 mg a day and starting aspirin lactone 50 mg a day.  Blood pressure still running labile.  From 100 4250 systolic.  Normal diastolic.  He feels fine.  He has had a recent CT myelogram done.  He is following up with the neurosurgeon soon for evaluation and possible surgery.  No cardiovascular symptoms.      The following portions of the patient's history were reviewed and updated as appropriate: allergies, current medications, past family history, past medical history, past social history, past surgical history and problem list.          Review of Systems   Constitutional: Negative.    Respiratory: Negative.    Cardiovascular: Negative.    Musculoskeletal: Positive for back pain.       Objective   Blood pressure 160/80, pulse 87, temperature 98.2 °F (36.8 °C), temperature source Oral, height 182.9 cm (72.01\"), weight 92.4 kg (203 lb 12.8 oz), SpO2 98 %.  Physical Exam   Constitutional: No distress.   HENT:   Head: Atraumatic.   Cardiovascular: Normal rate.   Pulmonary/Chest: Effort normal.   Neurological: He is alert. Coordination normal.   Psychiatric: He has a normal mood and affect. His behavior is normal.       Assessment/Plan   Olu was seen today for hypertension.    Diagnoses and all orders for this visit:    Essential hypertension    Hypokalemia  -     Basic Metabolic Panel; Future    Other orders  -     hydroCHLOROthiazide (HYDRODIURIL) 12.5 MG tablet; " Take 1 tablet by mouth Daily.      Hypertension, partially controlled.  Hypokalemia, now resolved.  Off potassium supplementation since starting spironolactone.  Potassium is 4.7, towards the higher end of normal.  I am going continue current medications and restart hydrochlorothiazide but at a lower dose.  He will have a BMP done in 1 week to assure potassium remains normalized.  And then the plan is to gradually increase the hydrochlorothiazide, or switch to chlorthalidone if we need to.  At this point, the patient is happier not taking all the potassium tablets.  I will see him back in 1 month recheck.  Also recommend BMP prior to that visit.  He will continue to monitor his blood pressure.  He will call with concerns.

## 2019-10-24 ENCOUNTER — PREP FOR SURGERY (OUTPATIENT)
Dept: OTHER | Facility: HOSPITAL | Age: 79
End: 2019-10-24

## 2019-10-24 ENCOUNTER — OFFICE VISIT (OUTPATIENT)
Dept: NEUROSURGERY | Facility: CLINIC | Age: 79
End: 2019-10-24

## 2019-10-24 VITALS — DIASTOLIC BLOOD PRESSURE: 76 MMHG | HEART RATE: 81 BPM | SYSTOLIC BLOOD PRESSURE: 149 MMHG

## 2019-10-24 DIAGNOSIS — M48.062 SPINAL STENOSIS OF LUMBAR REGION WITH NEUROGENIC CLAUDICATION: Primary | ICD-10-CM

## 2019-10-24 PROCEDURE — 99213 OFFICE O/P EST LOW 20 MIN: CPT | Performed by: NEUROLOGICAL SURGERY

## 2019-10-24 RX ORDER — CEFAZOLIN SODIUM 2 G/100ML
2 INJECTION, SOLUTION INTRAVENOUS ONCE
Status: CANCELLED | OUTPATIENT
Start: 2019-10-30 | End: 2019-10-24

## 2019-10-24 NOTE — PROGRESS NOTES
Subjective   Patient ID: Olu Izquierdo is a 79 y.o. male is here today for follow-up with a new Lumbar Myelogram that was ordered at his last office visit for back pain that radiated into bilateral thighs with tingling.  Today his symptoms are unchanged from his previous visit.    History of Present Illness    This patient continues with difficulty walking and an abnormal gait and walking bent over.  He says he does not have a lot of pain.    The following portions of the patient's history were reviewed and updated as appropriate: allergies, current medications, past family history, past medical history, past social history, past surgical history and problem list.    Review of Systems   Constitutional: Positive for activity change.   Respiratory: Negative for chest tightness and shortness of breath.    Cardiovascular: Negative for chest pain.   Musculoskeletal: Positive for back pain and myalgias.        Bilateral thigh pain   All other systems reviewed and are negative.      Objective   Physical Exam   Constitutional: He is oriented to person, place, and time. He appears well-developed and well-nourished.   Neurological: He is oriented to person, place, and time.     Neurologic Exam     Mental Status   Oriented to person, place, and time.       Assessment/Plan   Independent Review of Radiographic Studies:      I reviewed his plain films, myelogram, and CT scan myself.  The plain films show multilevel degenerative disease.  There is no evidence of abnormal movement on flexion extension.  On the myelogram itself there is fairly severe stenosis at L2-3 and L3-4 as well as some at L4-5.  On the lateral films there is significant stenosis at L2-3 and moderately significant stenosis at L3-4 less so at L4-5.  On the standing films L2-3 and L3-4 look more impressively stenotic.  On the post myelographic CT scan the lower thoracic spine down to L1 looks okay.  At L2-3 there is severe stenosis with complete loss of contrast.   There is moderate central stenosis at L3-4.  L4-5 shows the central stenosis is not all that bad but there is complete obliteration of the neuroforamina.  He may have had previous surgery on the left at that level.  L5-S1 also shows complete obliteration of the neuroforamina.    Medical Decision Making:      I told the patient about the imaging.  I initially discussed with him doing a multilevel decompression from L2 to the sacrum with a fusion.  As I discussed this further and found that his pain really is not that much of a factor and is more his gait I thought that trying a 1 level decompression at L2-3 might be of some benefit.  I told the patient about the risks, complications and expected outcome of the lumbar surgery.  I explained that there was an 80% chance of getting rid of the pain in the leg.  I explained that there would still be back pain after the surgery.  Initially this will be quite severe but will improve over time.  There is a 2 or 3% chance of infection, bleeding, CSF leak, damage to the nerve as a result of surgery, paralysis, as well as anesthetic risk.  There is a 5% chance of late instability which could require a fusion later on and a 10% chance of recurrent disc herniation.  We discussed the postoperative hospital and home course.  He would like to proceed.  His right leg seems to be a bit worse than his left.    He will need to be scheduled for a: Right L2-3 laminectomy with metrx    Olu was seen today for back pain.    Diagnoses and all orders for this visit:    Spinal stenosis of lumbar region with neurogenic claudication      Return for 2-3 week post op.

## 2019-10-25 ENCOUNTER — APPOINTMENT (OUTPATIENT)
Dept: PREADMISSION TESTING | Facility: HOSPITAL | Age: 79
End: 2019-10-25

## 2019-10-25 VITALS
HEIGHT: 72 IN | BODY MASS INDEX: 27.25 KG/M2 | WEIGHT: 201.2 LBS | TEMPERATURE: 98.8 F | SYSTOLIC BLOOD PRESSURE: 159 MMHG | RESPIRATION RATE: 18 BRPM | DIASTOLIC BLOOD PRESSURE: 76 MMHG | HEART RATE: 89 BPM | OXYGEN SATURATION: 97 %

## 2019-10-25 LAB
ANION GAP SERPL CALCULATED.3IONS-SCNC: 9.3 MMOL/L (ref 5–15)
BUN BLD-MCNC: 18 MG/DL (ref 8–23)
BUN/CREAT SERPL: 13.4 (ref 7–25)
CALCIUM SPEC-SCNC: 9.7 MG/DL (ref 8.6–10.5)
CHLORIDE SERPL-SCNC: 98 MMOL/L (ref 98–107)
CO2 SERPL-SCNC: 29.7 MMOL/L (ref 22–29)
CREAT BLD-MCNC: 1.34 MG/DL (ref 0.76–1.27)
DEPRECATED RDW RBC AUTO: 39.7 FL (ref 37–54)
ERYTHROCYTE [DISTWIDTH] IN BLOOD BY AUTOMATED COUNT: 12 % (ref 12.3–15.4)
GFR SERPL CREATININE-BSD FRML MDRD: 52 ML/MIN/1.73
GLUCOSE BLD-MCNC: 103 MG/DL (ref 65–99)
HCT VFR BLD AUTO: 42.6 % (ref 37.5–51)
HGB BLD-MCNC: 14.8 G/DL (ref 13–17.7)
MCH RBC QN AUTO: 31.8 PG (ref 26.6–33)
MCHC RBC AUTO-ENTMCNC: 34.7 G/DL (ref 31.5–35.7)
MCV RBC AUTO: 91.6 FL (ref 79–97)
PLATELET # BLD AUTO: 230 10*3/MM3 (ref 140–450)
PMV BLD AUTO: 9.5 FL (ref 6–12)
POTASSIUM BLD-SCNC: 3.9 MMOL/L (ref 3.5–5.2)
RBC # BLD AUTO: 4.65 10*6/MM3 (ref 4.14–5.8)
SODIUM BLD-SCNC: 137 MMOL/L (ref 136–145)
WBC NRBC COR # BLD: 8.63 10*3/MM3 (ref 3.4–10.8)

## 2019-10-25 PROCEDURE — 36415 COLL VENOUS BLD VENIPUNCTURE: CPT

## 2019-10-25 PROCEDURE — 80048 BASIC METABOLIC PNL TOTAL CA: CPT | Performed by: NEUROLOGICAL SURGERY

## 2019-10-25 PROCEDURE — 85027 COMPLETE CBC AUTOMATED: CPT | Performed by: NEUROLOGICAL SURGERY

## 2019-10-25 PROCEDURE — 93010 ELECTROCARDIOGRAM REPORT: CPT | Performed by: INTERNAL MEDICINE

## 2019-10-25 PROCEDURE — 93005 ELECTROCARDIOGRAM TRACING: CPT

## 2019-10-25 RX ORDER — HYDRALAZINE HYDROCHLORIDE 25 MG/1
25 TABLET, FILM COATED ORAL 2 TIMES DAILY
COMMUNITY
End: 2020-03-10

## 2019-10-25 NOTE — DISCHARGE INSTRUCTIONS
Take the following medications the morning of surgery with a small sip of water:  HYDRALAZINE  BRING RAMIPRIL WITH YOU            2% CHLORAHEXIDINE GLUCONATE* CLOTH  Preparing or “prepping” skin before surgery can reduce the risk of infection at the surgical site. To make the process easier, [facility] has chosen disposable cloths moistened with a rinse-free, 2% Chlorhexidine Gluconate (CHG) antiseptic solution. The steps below outline the prepping process and should be carefully followed.        Use the prep cloth on the area that is circled in the diagram             Directions Night before Surgery  1) Shower using a fresh bar of anti-bacterial soap (such as Dial) and clean washcloth.  Use a clean towel to completely dry your skin.  2) Do not use any lotions, oils or creams on your skin.  3) Open the package and remove 1 cloth, wipe your skin for 30 seconds in a circular motion.  Allow to dry for 3 minutes.  4) Repeat #3 with second cloth.  5) Do not touch your eyes, ears, or mouth with the prep cloth.  6) Allow the wet prep solution to air dry.  7) Discard the prep cloth and wash your hands with soap and water.   8) Dress in clean bed clothes and sleep on fresh clean bed sheets.   9) You may experience some temporary itching after the prep.    Directions Day of Surgery  1) Repeat steps 1,2,3,4,5,6,7, and 9.   2) Dress in clean clothes before coming to the hospital.        General Instructions: CLEAR LIQUIDS UNTIL 4:30 AM MORNING OF SURGERY  • Do not eat solid food after midnight the night before surgery.  • You may drink clear liquids day of surgery but must stop at least one hour before your hospital arrival time.  • It is beneficial for you to have a clear drink that contains carbohydrates the day of surgery.  We suggest a 12 to 20 ounce bottle of Gatorade or Powerade for non-diabetic patients or a 12 to 20 ounce bottle of G2 or Powerade Zero for diabetic patients. (Pediatric patients, are not  advised to drink a 12 to 20 ounce carbohydrate drink)    Clear liquids are liquids you can see through.  Nothing red in color.     Plain water                               Sports drinks  Sodas                                   Gelatin (Jell-O)  Fruit juices without pulp such as white grape juice and apple juice  Popsicles that contain no fruit or yogurt  Tea or coffee (no cream or milk added)  Gatorade / Powerade  G2 / Powerade Zero    • Infants may have breast milk up to four hours before surgery.  • Infants drinking formula may drink formula up to six hours before surgery.   • Patients who avoid smoking, chewing tobacco and alcohol for 4 weeks prior to surgery have a reduced risk of post-operative complications.  Quit smoking as many days before surgery as you can.  • Do not smoke, use chewing tobacco or drink alcohol the day of surgery.   • If applicable bring your C-PAP/ BI-PAP machine.  • Bring any papers given to you in the doctor’s office.  • Wear clean comfortable clothes.  • Do not wear contact lenses, false eyelashes or make-up.  Bring a case for your glasses.   • Bring crutches or walker if applicable.  • Remove all piercings.  Leave jewelry and any other valuables at home.  • Hair extensions with metal clips must be removed prior to surgery.  • The Pre-Admission Testing nurse will instruct you to bring medications if unable to obtain an accurate list in Pre-Admission Testing.        If you were given a blood bank ID arm band remember to bring it with you the day of surgery.    Preventing a Surgical Site Infection:  • For 2 to 3 days before surgery, avoid shaving with a razor because the razor can irritate skin and make it easier to develop an infection.    • Any areas of open skin can increase the risk of a post-operative wound infection by allowing bacteria to enter and travel throughout the body.  Notify your surgeon if you have any skin wounds / rashes even if it is not near the expected surgical  site.  The area will need assessed to determine if surgery should be delayed until it is healed.  • The night prior to surgery sleep in a clean bed with clean clothing.  Do not allow pets to sleep with you.  • Shower on the morning of surgery using a fresh bar of anti-bacterial soap (such as Dial) and clean washcloth.  Dry with a clean towel and dress in clean clothing.  • Ask your surgeon if you will be receiving antibiotics prior to surgery.  • Make sure you, your family, and all healthcare providers clean their hands with soap and water or an alcohol based hand  before caring for you or your wound.    Day of surgery: 10/30/2019 ARRIVAL TIME 5:30 AM  Your arrival time is approximately two hours before your scheduled surgery time.  Upon arrival, a Pre-op nurse and Anesthesiologist will review your health history, obtain vital signs, and answer questions you may have.  The only belongings needed at this time will be a list of your home medications and if applicable your C-PAP/BI-PAP machine.  If you are staying overnight your family can leave the rest of your belongings in the car and bring them to your room later.  A Pre-op nurse will start an IV and you may receive medication in preparation for surgery, including something to help you relax.  Your family will be able to see you in the Pre-op area.  Two visitors at a time will be allowed in the Pre-op room.  While you are in surgery your family should notify the waiting room  if they leave the waiting room area and provide a contact phone number.    Please be aware that surgery does come with discomfort.  We want to make every effort to control your discomfort so please discuss any uncontrolled symptoms with your nurse.   Your doctor will most likely have prescribed pain medications.      If you are going home after surgery you will receive individualized written care instructions before being discharged.  A responsible adult must drive you to  and from the hospital on the day of your surgery and stay with you for 24 hours.    If you are staying overnight following surgery, you will be transported to your hospital room following the recovery period.  [facility] has all private rooms.    You have received a list of surgical assistants for your reference.  If you have any questions please call Pre-Admission Testing at [phone].  Deductibles and co-payments are collected on the day of service. Please be prepared to pay the required co-pay, deductible or deposit on the day of service as defined by your plan.

## 2019-10-28 ENCOUNTER — TELEPHONE (OUTPATIENT)
Dept: FAMILY MEDICINE CLINIC | Facility: CLINIC | Age: 79
End: 2019-10-28

## 2019-10-28 ENCOUNTER — TELEPHONE (OUTPATIENT)
Dept: NEUROSURGERY | Facility: CLINIC | Age: 79
End: 2019-10-28

## 2019-10-28 NOTE — TELEPHONE ENCOUNTER
There is a little risk of permanent damage.  I probably would not wait until after the holidays but I think waiting a few weeks will not make much difference.

## 2019-10-28 NOTE — TELEPHONE ENCOUNTER
Pt recently had labs with [facility] and will like to know if they are good enough for  or will he need to do more labs? He also has some questions about an upcomming surgery.

## 2019-10-28 NOTE — TELEPHONE ENCOUNTER
"Pt called to cancel surgery for 10/30/19. His son passed on 10/27/19 and he isn't able to handle having surgery right now. He will call to r/s when he is in a better place emotionally. He states that possibly \"after the holidays\".    He wants to know if he is risking permanent damage by waiting. I told him that we would check with Dr. Dudley and see what he has to say. We will get back with him when we hear from Dr. Dudley.  "

## 2019-10-28 NOTE — TELEPHONE ENCOUNTER
Spoke with the patient's wife. They will call next week on M W or F to get his surgery r/s. They will ensure it is prior to the Holidays they just need some time to collect thoughts and get some things taken care of.

## 2019-10-30 ENCOUNTER — RESULTS ENCOUNTER (OUTPATIENT)
Dept: FAMILY MEDICINE CLINIC | Facility: CLINIC | Age: 79
End: 2019-10-30

## 2019-10-30 DIAGNOSIS — E87.6 HYPOKALEMIA: ICD-10-CM

## 2019-11-01 ENCOUNTER — TELEPHONE (OUTPATIENT)
Dept: FAMILY MEDICINE CLINIC | Facility: CLINIC | Age: 79
End: 2019-11-01

## 2019-11-01 NOTE — TELEPHONE ENCOUNTER
Tell him I looked at the BMP done by the surgeon.  The potassium looks great.  Continue medication as is.  The creatinine kidney function level is just slightly off.  Like to repeat a BMP postoperatively.  Around November 20.  Thank you

## 2019-11-01 NOTE — TELEPHONE ENCOUNTER
Pt had walked in today. He did not get lab work done to check the BMP. He did get it drawn again thought the pre op for his back surgery on 10-25-19. He was not sure if he had to get them again. I told him no that we will hold off and have you look at those. He is having surgery next fri 11-8-19. Please advise if anything else needs to be done

## 2019-11-08 ENCOUNTER — ANESTHESIA (OUTPATIENT)
Dept: PERIOP | Facility: HOSPITAL | Age: 79
End: 2019-11-08

## 2019-11-08 ENCOUNTER — HOSPITAL ENCOUNTER (OUTPATIENT)
Facility: HOSPITAL | Age: 79
Setting detail: HOSPITAL OUTPATIENT SURGERY
Discharge: HOME | End: 2019-11-08
Attending: NEUROLOGICAL SURGERY | Admitting: NEUROLOGICAL SURGERY

## 2019-11-08 ENCOUNTER — ANESTHESIA EVENT (OUTPATIENT)
Dept: PERIOP | Facility: HOSPITAL | Age: 79
End: 2019-11-08

## 2019-11-08 ENCOUNTER — APPOINTMENT (OUTPATIENT)
Dept: GENERAL RADIOLOGY | Facility: HOSPITAL | Age: 79
End: 2019-11-08

## 2019-11-08 VITALS
WEIGHT: 197.75 LBS | HEART RATE: 88 BPM | DIASTOLIC BLOOD PRESSURE: 77 MMHG | SYSTOLIC BLOOD PRESSURE: 138 MMHG | OXYGEN SATURATION: 97 % | TEMPERATURE: 97.7 F | BODY MASS INDEX: 26.82 KG/M2 | RESPIRATION RATE: 16 BRPM

## 2019-11-08 DIAGNOSIS — M48.062 SPINAL STENOSIS OF LUMBAR REGION WITH NEUROGENIC CLAUDICATION: ICD-10-CM

## 2019-11-08 PROCEDURE — 25010000002 ONDANSETRON PER 1 MG: Performed by: NURSE ANESTHETIST, CERTIFIED REGISTERED

## 2019-11-08 PROCEDURE — 72100 X-RAY EXAM L-S SPINE 2/3 VWS: CPT

## 2019-11-08 PROCEDURE — 25010000002 MIDAZOLAM PER 1 MG: Performed by: ANESTHESIOLOGY

## 2019-11-08 PROCEDURE — 25010000002 PHENYLEPHRINE PER 1 ML: Performed by: NURSE ANESTHETIST, CERTIFIED REGISTERED

## 2019-11-08 PROCEDURE — 25010000003 CEFAZOLIN IN DEXTROSE 2-4 GM/100ML-% SOLUTION: Performed by: NEUROLOGICAL SURGERY

## 2019-11-08 PROCEDURE — 25010000002 NEOSTIGMINE PER 0.5 MG: Performed by: NURSE ANESTHETIST, CERTIFIED REGISTERED

## 2019-11-08 PROCEDURE — 25010000002 FENTANYL CITRATE (PF) 100 MCG/2ML SOLUTION: Performed by: NURSE ANESTHETIST, CERTIFIED REGISTERED

## 2019-11-08 PROCEDURE — 76000 FLUOROSCOPY <1 HR PHYS/QHP: CPT

## 2019-11-08 PROCEDURE — 25010000002 DEXAMETHASONE PER 1 MG: Performed by: NURSE ANESTHETIST, CERTIFIED REGISTERED

## 2019-11-08 PROCEDURE — 25010000002 PROPOFOL 10 MG/ML EMULSION: Performed by: NURSE ANESTHETIST, CERTIFIED REGISTERED

## 2019-11-08 PROCEDURE — 63047 LAM FACETEC & FORAMOT LUMBAR: CPT | Performed by: NEUROLOGICAL SURGERY

## 2019-11-08 DEVICE — SSC BONE WAX
Type: IMPLANTABLE DEVICE | Site: SPINE LUMBAR | Status: FUNCTIONAL
Brand: SSC BONE WAX

## 2019-11-08 RX ORDER — MIDAZOLAM HYDROCHLORIDE 1 MG/ML
1 INJECTION INTRAMUSCULAR; INTRAVENOUS
Status: DISCONTINUED | OUTPATIENT
Start: 2019-11-08 | End: 2019-11-08 | Stop reason: HOSPADM

## 2019-11-08 RX ORDER — SODIUM CHLORIDE 0.9 % (FLUSH) 0.9 %
3 SYRINGE (ML) INJECTION EVERY 12 HOURS SCHEDULED
Status: DISCONTINUED | OUTPATIENT
Start: 2019-11-08 | End: 2019-11-08 | Stop reason: HOSPADM

## 2019-11-08 RX ORDER — FLUMAZENIL 0.1 MG/ML
0.2 INJECTION INTRAVENOUS AS NEEDED
Status: DISCONTINUED | OUTPATIENT
Start: 2019-11-08 | End: 2019-11-08 | Stop reason: HOSPADM

## 2019-11-08 RX ORDER — OXYCODONE AND ACETAMINOPHEN 7.5; 325 MG/1; MG/1
1 TABLET ORAL ONCE AS NEEDED
Status: DISCONTINUED | OUTPATIENT
Start: 2019-11-08 | End: 2019-11-08 | Stop reason: HOSPADM

## 2019-11-08 RX ORDER — CEFAZOLIN SODIUM 2 G/100ML
2 INJECTION, SOLUTION INTRAVENOUS ONCE
Status: COMPLETED | OUTPATIENT
Start: 2019-11-08 | End: 2019-11-08

## 2019-11-08 RX ORDER — DEXAMETHASONE SODIUM PHOSPHATE 10 MG/ML
INJECTION INTRAMUSCULAR; INTRAVENOUS AS NEEDED
Status: DISCONTINUED | OUTPATIENT
Start: 2019-11-08 | End: 2019-11-08 | Stop reason: SURG

## 2019-11-08 RX ORDER — PROPOFOL 10 MG/ML
VIAL (ML) INTRAVENOUS AS NEEDED
Status: DISCONTINUED | OUTPATIENT
Start: 2019-11-08 | End: 2019-11-08 | Stop reason: SURG

## 2019-11-08 RX ORDER — PROMETHAZINE HYDROCHLORIDE 25 MG/ML
6.25 INJECTION, SOLUTION INTRAMUSCULAR; INTRAVENOUS
Status: DISCONTINUED | OUTPATIENT
Start: 2019-11-08 | End: 2019-11-08 | Stop reason: HOSPADM

## 2019-11-08 RX ORDER — EPHEDRINE SULFATE 50 MG/ML
5 INJECTION, SOLUTION INTRAVENOUS ONCE AS NEEDED
Status: DISCONTINUED | OUTPATIENT
Start: 2019-11-08 | End: 2019-11-08 | Stop reason: HOSPADM

## 2019-11-08 RX ORDER — DIPHENHYDRAMINE HCL 25 MG
25 CAPSULE ORAL
Status: DISCONTINUED | OUTPATIENT
Start: 2019-11-08 | End: 2019-11-08 | Stop reason: HOSPADM

## 2019-11-08 RX ORDER — ONDANSETRON 2 MG/ML
INJECTION INTRAMUSCULAR; INTRAVENOUS AS NEEDED
Status: DISCONTINUED | OUTPATIENT
Start: 2019-11-08 | End: 2019-11-08 | Stop reason: SURG

## 2019-11-08 RX ORDER — FAMOTIDINE 10 MG/ML
20 INJECTION, SOLUTION INTRAVENOUS ONCE
Status: COMPLETED | OUTPATIENT
Start: 2019-11-08 | End: 2019-11-08

## 2019-11-08 RX ORDER — LIDOCAINE HYDROCHLORIDE 10 MG/ML
0.5 INJECTION, SOLUTION EPIDURAL; INFILTRATION; INTRACAUDAL; PERINEURAL ONCE AS NEEDED
Status: DISCONTINUED | OUTPATIENT
Start: 2019-11-08 | End: 2019-11-08 | Stop reason: HOSPADM

## 2019-11-08 RX ORDER — DIPHENHYDRAMINE HYDROCHLORIDE 50 MG/ML
12.5 INJECTION INTRAMUSCULAR; INTRAVENOUS
Status: DISCONTINUED | OUTPATIENT
Start: 2019-11-08 | End: 2019-11-08 | Stop reason: HOSPADM

## 2019-11-08 RX ORDER — FENTANYL CITRATE 50 UG/ML
50 INJECTION, SOLUTION INTRAMUSCULAR; INTRAVENOUS
Status: DISCONTINUED | OUTPATIENT
Start: 2019-11-08 | End: 2019-11-08 | Stop reason: HOSPADM

## 2019-11-08 RX ORDER — HYDRALAZINE HYDROCHLORIDE 20 MG/ML
5 INJECTION INTRAMUSCULAR; INTRAVENOUS
Status: DISCONTINUED | OUTPATIENT
Start: 2019-11-08 | End: 2019-11-08 | Stop reason: HOSPADM

## 2019-11-08 RX ORDER — HYDROCODONE BITARTRATE AND ACETAMINOPHEN 5; 325 MG/1; MG/1
1 TABLET ORAL EVERY 4 HOURS PRN
Qty: 35 TABLET | Refills: 0
Start: 2019-11-08 | End: 2019-12-02

## 2019-11-08 RX ORDER — FENTANYL CITRATE 50 UG/ML
INJECTION, SOLUTION INTRAMUSCULAR; INTRAVENOUS AS NEEDED
Status: DISCONTINUED | OUTPATIENT
Start: 2019-11-08 | End: 2019-11-08 | Stop reason: SURG

## 2019-11-08 RX ORDER — HYDROCODONE BITARTRATE AND ACETAMINOPHEN 7.5; 325 MG/1; MG/1
1 TABLET ORAL ONCE AS NEEDED
Status: COMPLETED | OUTPATIENT
Start: 2019-11-08 | End: 2019-11-08

## 2019-11-08 RX ORDER — EPHEDRINE SULFATE 50 MG/ML
INJECTION, SOLUTION INTRAVENOUS AS NEEDED
Status: DISCONTINUED | OUTPATIENT
Start: 2019-11-08 | End: 2019-11-08 | Stop reason: SURG

## 2019-11-08 RX ORDER — ROCURONIUM BROMIDE 10 MG/ML
INJECTION, SOLUTION INTRAVENOUS AS NEEDED
Status: DISCONTINUED | OUTPATIENT
Start: 2019-11-08 | End: 2019-11-08 | Stop reason: SURG

## 2019-11-08 RX ORDER — LIDOCAINE HYDROCHLORIDE 20 MG/ML
INJECTION, SOLUTION INFILTRATION; PERINEURAL AS NEEDED
Status: DISCONTINUED | OUTPATIENT
Start: 2019-11-08 | End: 2019-11-08 | Stop reason: SURG

## 2019-11-08 RX ORDER — SODIUM CHLORIDE, SODIUM LACTATE, POTASSIUM CHLORIDE, CALCIUM CHLORIDE 600; 310; 30; 20 MG/100ML; MG/100ML; MG/100ML; MG/100ML
9 INJECTION, SOLUTION INTRAVENOUS CONTINUOUS
Status: DISCONTINUED | OUTPATIENT
Start: 2019-11-08 | End: 2019-11-08 | Stop reason: HOSPADM

## 2019-11-08 RX ORDER — LABETALOL HYDROCHLORIDE 5 MG/ML
5 INJECTION, SOLUTION INTRAVENOUS
Status: DISCONTINUED | OUTPATIENT
Start: 2019-11-08 | End: 2019-11-08 | Stop reason: HOSPADM

## 2019-11-08 RX ORDER — GLYCOPYRROLATE 0.2 MG/ML
INJECTION INTRAMUSCULAR; INTRAVENOUS AS NEEDED
Status: DISCONTINUED | OUTPATIENT
Start: 2019-11-08 | End: 2019-11-08 | Stop reason: SURG

## 2019-11-08 RX ORDER — MIDAZOLAM HYDROCHLORIDE 1 MG/ML
2 INJECTION INTRAMUSCULAR; INTRAVENOUS
Status: DISCONTINUED | OUTPATIENT
Start: 2019-11-08 | End: 2019-11-08 | Stop reason: HOSPADM

## 2019-11-08 RX ORDER — PROMETHAZINE HYDROCHLORIDE 25 MG/1
25 SUPPOSITORY RECTAL ONCE AS NEEDED
Status: DISCONTINUED | OUTPATIENT
Start: 2019-11-08 | End: 2019-11-08 | Stop reason: HOSPADM

## 2019-11-08 RX ORDER — PROMETHAZINE HYDROCHLORIDE 25 MG/ML
12.5 INJECTION, SOLUTION INTRAMUSCULAR; INTRAVENOUS ONCE AS NEEDED
Status: DISCONTINUED | OUTPATIENT
Start: 2019-11-08 | End: 2019-11-08 | Stop reason: HOSPADM

## 2019-11-08 RX ORDER — ACETAMINOPHEN 325 MG/1
650 TABLET ORAL ONCE AS NEEDED
Status: DISCONTINUED | OUTPATIENT
Start: 2019-11-08 | End: 2019-11-08 | Stop reason: HOSPADM

## 2019-11-08 RX ORDER — PROMETHAZINE HYDROCHLORIDE 25 MG/1
25 TABLET ORAL ONCE AS NEEDED
Status: DISCONTINUED | OUTPATIENT
Start: 2019-11-08 | End: 2019-11-08 | Stop reason: HOSPADM

## 2019-11-08 RX ORDER — SODIUM CHLORIDE 0.9 % (FLUSH) 0.9 %
3-10 SYRINGE (ML) INJECTION AS NEEDED
Status: DISCONTINUED | OUTPATIENT
Start: 2019-11-08 | End: 2019-11-08 | Stop reason: HOSPADM

## 2019-11-08 RX ORDER — ONDANSETRON 2 MG/ML
4 INJECTION INTRAMUSCULAR; INTRAVENOUS ONCE AS NEEDED
Status: COMPLETED | OUTPATIENT
Start: 2019-11-08 | End: 2019-11-08

## 2019-11-08 RX ORDER — HYDROMORPHONE HYDROCHLORIDE 1 MG/ML
0.5 INJECTION, SOLUTION INTRAMUSCULAR; INTRAVENOUS; SUBCUTANEOUS
Status: DISCONTINUED | OUTPATIENT
Start: 2019-11-08 | End: 2019-11-08 | Stop reason: HOSPADM

## 2019-11-08 RX ORDER — NALOXONE HCL 0.4 MG/ML
0.2 VIAL (ML) INJECTION AS NEEDED
Status: DISCONTINUED | OUTPATIENT
Start: 2019-11-08 | End: 2019-11-08 | Stop reason: HOSPADM

## 2019-11-08 RX ADMIN — SODIUM CHLORIDE, POTASSIUM CHLORIDE, SODIUM LACTATE AND CALCIUM CHLORIDE 9 ML/HR: 600; 310; 30; 20 INJECTION, SOLUTION INTRAVENOUS at 07:39

## 2019-11-08 RX ADMIN — FAMOTIDINE 20 MG: 10 INJECTION INTRAVENOUS at 07:39

## 2019-11-08 RX ADMIN — ONDANSETRON 4 MG: 2 INJECTION INTRAMUSCULAR; INTRAVENOUS at 09:20

## 2019-11-08 RX ADMIN — PHENYLEPHRINE HYDROCHLORIDE 100 MCG: 10 INJECTION INTRAVENOUS at 08:07

## 2019-11-08 RX ADMIN — ONDANSETRON 4 MG: 2 INJECTION INTRAMUSCULAR; INTRAVENOUS at 10:12

## 2019-11-08 RX ADMIN — HYDROCODONE BITARTRATE AND ACETAMINOPHEN 1 TABLET: 7.5; 325 TABLET ORAL at 10:43

## 2019-11-08 RX ADMIN — PROPOFOL 150 MG: 10 INJECTION, EMULSION INTRAVENOUS at 08:05

## 2019-11-08 RX ADMIN — LIDOCAINE HYDROCHLORIDE 100 MG: 20 INJECTION, SOLUTION INFILTRATION; PERINEURAL at 08:05

## 2019-11-08 RX ADMIN — EPHEDRINE SULFATE 10 MG: 50 INJECTION INTRAMUSCULAR; INTRAVENOUS; SUBCUTANEOUS at 09:01

## 2019-11-08 RX ADMIN — CEFAZOLIN SODIUM 1 G: 2 INJECTION, SOLUTION INTRAVENOUS at 09:12

## 2019-11-08 RX ADMIN — PHENYLEPHRINE HYDROCHLORIDE 100 MCG: 10 INJECTION INTRAVENOUS at 08:35

## 2019-11-08 RX ADMIN — SODIUM CHLORIDE, POTASSIUM CHLORIDE, SODIUM LACTATE AND CALCIUM CHLORIDE 9 ML/HR: 600; 310; 30; 20 INJECTION, SOLUTION INTRAVENOUS at 10:24

## 2019-11-08 RX ADMIN — MIDAZOLAM 2 MG: 1 INJECTION INTRAMUSCULAR; INTRAVENOUS at 07:39

## 2019-11-08 RX ADMIN — PROPOFOL 50 MG: 10 INJECTION, EMULSION INTRAVENOUS at 08:54

## 2019-11-08 RX ADMIN — FENTANYL CITRATE 50 MCG: 50 INJECTION INTRAMUSCULAR; INTRAVENOUS at 08:05

## 2019-11-08 RX ADMIN — DEXAMETHASONE SODIUM PHOSPHATE 4 MG: 10 INJECTION INTRAMUSCULAR; INTRAVENOUS at 08:30

## 2019-11-08 RX ADMIN — FENTANYL CITRATE 50 MCG: 50 INJECTION, SOLUTION INTRAMUSCULAR; INTRAVENOUS at 10:07

## 2019-11-08 RX ADMIN — PHENYLEPHRINE HYDROCHLORIDE 100 MCG: 10 INJECTION INTRAVENOUS at 08:20

## 2019-11-08 RX ADMIN — FENTANYL CITRATE 50 MCG: 50 INJECTION, SOLUTION INTRAMUSCULAR; INTRAVENOUS at 10:20

## 2019-11-08 RX ADMIN — CEFAZOLIN SODIUM 2 G: 2 INJECTION, SOLUTION INTRAVENOUS at 08:15

## 2019-11-08 RX ADMIN — GLYCOPYRROLATE 0.4 MG: 0.2 INJECTION INTRAMUSCULAR; INTRAVENOUS at 09:20

## 2019-11-08 RX ADMIN — ROCURONIUM BROMIDE 50 MG: 10 INJECTION INTRAVENOUS at 08:05

## 2019-11-08 RX ADMIN — FENTANYL CITRATE 50 MCG: 50 INJECTION INTRAMUSCULAR; INTRAVENOUS at 08:54

## 2019-11-08 RX ADMIN — NEOSTIGMINE METHYLSULFATE 2 MG: 1 INJECTION INTRAMUSCULAR; INTRAVENOUS; SUBCUTANEOUS at 09:20

## 2019-11-08 NOTE — ANESTHESIA PROCEDURE NOTES
Airway  Urgency: elective    Date/Time: 11/8/2019 8:06 AM    General Information and Staff    Patient location during procedure: OR  Anesthesiologist: Izaiah Gupta MD  CRNA: Srinivasan Anton CRNA    Indications and Patient Condition  Indications for airway management: airway protection    Preoxygenated: yes  MILS maintained throughout  Mask difficulty assessment: 2 - vent by mask + OA or adjuvant +/- NMBA    Final Airway Details  Final airway type: endotracheal airway      Successful airway: reinforced tube  Cuffed: yes   Successful intubation technique: direct laryngoscopy  Endotracheal tube insertion site: oral  Blade: Roque  Blade size: 4  Cormack-Lehane Classification: grade I - full view of glottis  Placement verified by: chest auscultation and capnometry   Measured from: gums  ETT/EBT to gums (cm): 23  Number of attempts at approach: 1  Assessment: lips, teeth, and gum same as pre-op and atraumatic intubation

## 2019-11-08 NOTE — BRIEF OP NOTE
LUMBAR DISCECTOMY POSTERIOR WITH METRIX  Progress Note    Olu Izquierdo  11/8/2019    Pre-op Diagnosis:   Spinal stenosis of lumbar region with neurogenic claudication [M48.062]       Post-Op Diagnosis Codes:     * Spinal stenosis of lumbar region with neurogenic claudication [M48.062]    Procedure/CPT® Codes:      Procedure(s):  Right Lumbar two-three laminectomy with metrix    Surgeon(s):  Luis Dudley MD    Anesthesia: General    Staff:   Circulator: Julianne Jack RN  Radiology Technologist: Mira Castano  Scrub Person: Dorota Donis  Assistant: Nahed Felton CSA  Other: Sonali Graff RN    Estimated Blood Loss: 100 mL    Urine Voided: 0 mL    Specimens:                None          Drains:      Findings: severe stenosis    Complications: none      Luis Dudley MD     Date: 11/8/2019  Time: 9:46 AM

## 2019-11-08 NOTE — ANESTHESIA PREPROCEDURE EVALUATION
Anesthesia Evaluation     Patient summary reviewed and Nursing notes reviewed   history of anesthetic complications (difficulty urinating after surgery):  NPO Solid Status: > 8 hours  NPO Liquid Status: > 2 hours           Airway   Mallampati: II  Neck ROM: full  No difficulty expected  Dental    (+) upper dentures    Pulmonary     breath sounds clear to auscultation  Cardiovascular     Rhythm: regular    (+) hypertension, hyperlipidemia,       Neuro/Psych  (+) numbness,     GI/Hepatic/Renal/Endo      Musculoskeletal     Abdominal    Substance History      OB/GYN          Other   arthritis,                      Anesthesia Plan    ASA 2     general     intravenous induction     Anesthetic plan, all risks, benefits, and alternatives have been provided, discussed and informed consent has been obtained with: patient.

## 2019-11-08 NOTE — ANESTHESIA POSTPROCEDURE EVALUATION
Patient: Olu Izquierdo    Procedure Summary     Date:  11/08/19 Room / Location:  [facility] OR [address] [place] [address] MAIN OR    Anesthesia Start:  0800 Anesthesia Stop:  0940    Procedure:  Right Lumbar two-three laminectomy with metrix (Right Spine Lumbar) Diagnosis:       Spinal stenosis of lumbar region with neurogenic claudication      (Spinal stenosis of lumbar region with neurogenic claudication [M48.062])    Surgeon:  Luis Dudley MD Provider:  Izaiah Gupta MD    Anesthesia Type:  general ASA Status:  2          Anesthesia Type: general  Last vitals  BP   139/73 (11/08/19 1115)   Temp   36.5 °C (97.7 °F) (11/08/19 1105)   Pulse   89 (11/08/19 1115)   Resp   16 (11/08/19 1115)     SpO2   97 % (11/08/19 1115)     Post Anesthesia Care and Evaluation    Patient location during evaluation: bedside  Patient participation: complete - patient participated  Level of consciousness: awake and alert  Pain management: adequate  Airway patency: patent  Anesthetic complications: No anesthetic complications  PONV Status: controlled  Cardiovascular status: acceptable  Respiratory status: acceptable  Hydration status: acceptable

## 2019-11-08 NOTE — OP NOTE
Preop diagnosis: Lumbar stenosis with neurogenic claudication L2-3    Postop diagnosis: same    Procedure performed: Right L2-3 laminectomy with a crossover to the left using minimal access spinal technologies    Surgeon: Luis Dudley M.D.    Assistant: Nahed Felton CFA who was instrumental in helping with visualization of neural structures, hemostasis, and retraction of neural structures.    Indications for the procedure:  This is a patient with severe pain in the legs.  Previous imaging had shown neural compression at the L2-3 levels.  As a result of this and the failure of conservative therapy the patient elected to proceed with surgery.    Operative summary:  After induction of general anesthesia the patient was intubated and placed on the operating table in the prone position on a Kendall table.  All pressure points were padded including peripheral points of entrapment.  The back was prepped with Chloraprep and then draped with Ioban, half sheets, and a split sheet.      The L2-3 level was localized with intraoperative fluoroscopy an incision was made on the right just above the pedicle.  Successive dilating tubes up to 18 mm by 5 cm were placed over that area.  Soft tissue was then removed from the supralaminar space.  The inferior laminar arch of L2 as well as the superior laminar arch of L3 and the medial aspect of facet were removed with the My COI drill.  The remainder of the operation was done under high-power magnification of the operating microscope using microsurgical technique and microsurgical instrumentation.  The ligamentum flavum was opened and removed out to the level of the pedicle using the Kerrison rongeurs.  This exposed the lateral thecal sac and the nerve root of L3.  Once the lateral recess was opened the dissection was carried up to the L2 pedicle completely decompressing the superior nerve root.    Once this was done the L3 nerve root was mobilized medially to expose the disc.   There was no evidence of a disc herniation.  Following this the tube was angled to the left and the anterior aspect of the spinous process as well as some of the left-sided lamina arches were removed to open the lateral recess on the left.  I was able to palpate the pedicles both L2 and L3 to be sure that the nerve roots on the left were relatively well decompressed.  Following this the bleeding was controlled with bipolar cautery.    Once the entire decompression was completed we were able to explore under the nerve root and the thecal sac using the Mendoza ball probe to be sure there was no evidence of residual compression.there being none bleeding was controlled again using the bipolar cautery, FloSeal, and thrombin and Gelfoam.  The area was then copiously irrigated with bacitracin solution and the tube was removed. The paraspinous musculature was injected with 100 cc 1/8% Marcaine with 1:200,000 epinephrine solution.    Another gram of Kefzol was given prior to closure.    The incision was then closed in layersdressed and the patient was taken to the recovery room in stable condition there were no apparent complications.  Sponge, instrument, and needle counts were correct at the end of the procedure.

## 2019-11-11 ENCOUNTER — TELEPHONE (OUTPATIENT)
Dept: NEUROSURGERY | Facility: CLINIC | Age: 79
End: 2019-11-11

## 2019-11-11 ENCOUNTER — HOSPITAL ENCOUNTER (EMERGENCY)
Facility: HOSPITAL | Age: 79
Discharge: HOME | End: 2019-11-11
Attending: EMERGENCY MEDICINE | Admitting: EMERGENCY MEDICINE

## 2019-11-11 VITALS
DIASTOLIC BLOOD PRESSURE: 80 MMHG | SYSTOLIC BLOOD PRESSURE: 146 MMHG | OXYGEN SATURATION: 95 % | HEART RATE: 94 BPM | BODY MASS INDEX: 26.68 KG/M2 | TEMPERATURE: 98.9 F | WEIGHT: 197 LBS | HEIGHT: 72 IN | RESPIRATION RATE: 16 BRPM

## 2019-11-11 DIAGNOSIS — R82.998 DARK URINE: ICD-10-CM

## 2019-11-11 DIAGNOSIS — M54.9 POSTOPERATIVE BACK PAIN: ICD-10-CM

## 2019-11-11 DIAGNOSIS — G89.18 POSTOPERATIVE BACK PAIN: ICD-10-CM

## 2019-11-11 DIAGNOSIS — E86.0 MILD DEHYDRATION: Primary | ICD-10-CM

## 2019-11-11 LAB
ALBUMIN SERPL-MCNC: 4 G/DL (ref 3.5–5.2)
ALBUMIN/GLOB SERPL: 1.2 G/DL
ALP SERPL-CCNC: 73 U/L (ref 39–117)
ALT SERPL W P-5'-P-CCNC: 16 U/L (ref 1–41)
ANION GAP SERPL CALCULATED.3IONS-SCNC: 14.3 MMOL/L (ref 5–15)
ANION GAP SERPL CALCULATED.3IONS-SCNC: 14.3 MMOL/L (ref 5–15)
AST SERPL-CCNC: 17 U/L (ref 1–40)
BASOPHILS # BLD AUTO: 0.09 10*3/MM3 (ref 0–0.2)
BASOPHILS NFR BLD AUTO: 0.7 % (ref 0–1.5)
BILIRUB SERPL-MCNC: 0.5 MG/DL (ref 0.2–1.2)
BILIRUB UR QL STRIP: NEGATIVE
BUN BLD-MCNC: 23 MG/DL (ref 8–23)
BUN BLD-MCNC: 23 MG/DL (ref 8–23)
BUN/CREAT SERPL: 20.4 (ref 7–25)
BUN/CREAT SERPL: 20.4 (ref 7–25)
CALCIUM SPEC-SCNC: 9.5 MG/DL (ref 8.6–10.5)
CALCIUM SPEC-SCNC: 9.5 MG/DL (ref 8.6–10.5)
CHLORIDE SERPL-SCNC: 91 MMOL/L (ref 98–107)
CHLORIDE SERPL-SCNC: 91 MMOL/L (ref 98–107)
CLARITY UR: CLEAR
CO2 SERPL-SCNC: 26.7 MMOL/L (ref 22–29)
CO2 SERPL-SCNC: 26.7 MMOL/L (ref 22–29)
COLOR UR: YELLOW
CREAT BLD-MCNC: 1.13 MG/DL (ref 0.76–1.27)
CREAT BLD-MCNC: 1.13 MG/DL (ref 0.76–1.27)
DEPRECATED RDW RBC AUTO: 39.3 FL (ref 37–54)
EOSINOPHIL # BLD AUTO: 0.17 10*3/MM3 (ref 0–0.4)
EOSINOPHIL NFR BLD AUTO: 1.2 % (ref 0.3–6.2)
ERYTHROCYTE [DISTWIDTH] IN BLOOD BY AUTOMATED COUNT: 11.8 % (ref 12.3–15.4)
GFR SERPL CREATININE-BSD FRML MDRD: 63 ML/MIN/1.73
GFR SERPL CREATININE-BSD FRML MDRD: 63 ML/MIN/1.73
GLOBULIN UR ELPH-MCNC: 3.3 GM/DL
GLUCOSE BLD-MCNC: 137 MG/DL (ref 65–99)
GLUCOSE BLD-MCNC: 137 MG/DL (ref 65–99)
GLUCOSE UR STRIP-MCNC: NEGATIVE MG/DL
HCT VFR BLD AUTO: 42.2 % (ref 37.5–51)
HGB BLD-MCNC: 14.4 G/DL (ref 13–17.7)
HGB UR QL STRIP.AUTO: NEGATIVE
IMM GRANULOCYTES # BLD AUTO: 0.11 10*3/MM3 (ref 0–0.05)
IMM GRANULOCYTES NFR BLD AUTO: 0.8 % (ref 0–0.5)
KETONES UR QL STRIP: ABNORMAL
LEUKOCYTE ESTERASE UR QL STRIP.AUTO: NEGATIVE
LYMPHOCYTES # BLD AUTO: 1.15 10*3/MM3 (ref 0.7–3.1)
LYMPHOCYTES NFR BLD AUTO: 8.4 % (ref 19.6–45.3)
MCH RBC QN AUTO: 31.1 PG (ref 26.6–33)
MCHC RBC AUTO-ENTMCNC: 34.1 G/DL (ref 31.5–35.7)
MCV RBC AUTO: 91.1 FL (ref 79–97)
MONOCYTES # BLD AUTO: 2.01 10*3/MM3 (ref 0.1–0.9)
MONOCYTES NFR BLD AUTO: 14.7 % (ref 5–12)
NEUTROPHILS # BLD AUTO: 10.19 10*3/MM3 (ref 1.7–7)
NEUTROPHILS NFR BLD AUTO: 74.2 % (ref 42.7–76)
NITRITE UR QL STRIP: NEGATIVE
NRBC BLD AUTO-RTO: 0 /100 WBC (ref 0–0.2)
PH UR STRIP.AUTO: <=5 [PH] (ref 5–8)
PLATELET # BLD AUTO: 250 10*3/MM3 (ref 140–450)
PMV BLD AUTO: 9.6 FL (ref 6–12)
POTASSIUM BLD-SCNC: 4 MMOL/L (ref 3.5–5.2)
POTASSIUM BLD-SCNC: 4 MMOL/L (ref 3.5–5.2)
PROT SERPL-MCNC: 7.3 G/DL (ref 6–8.5)
PROT UR QL STRIP: ABNORMAL
RBC # BLD AUTO: 4.63 10*6/MM3 (ref 4.14–5.8)
SODIUM BLD-SCNC: 132 MMOL/L (ref 136–145)
SODIUM BLD-SCNC: 132 MMOL/L (ref 136–145)
SP GR UR STRIP: 1.03 (ref 1–1.03)
UROBILINOGEN UR QL STRIP: ABNORMAL
WBC NRBC COR # BLD: 13.72 10*3/MM3 (ref 3.4–10.8)

## 2019-11-11 PROCEDURE — 80053 COMPREHEN METABOLIC PANEL: CPT | Performed by: EMERGENCY MEDICINE

## 2019-11-11 PROCEDURE — 99284 EMERGENCY DEPT VISIT MOD MDM: CPT

## 2019-11-11 PROCEDURE — 85025 COMPLETE CBC W/AUTO DIFF WBC: CPT | Performed by: EMERGENCY MEDICINE

## 2019-11-11 PROCEDURE — 81003 URINALYSIS AUTO W/O SCOPE: CPT | Performed by: EMERGENCY MEDICINE

## 2019-11-11 RX ORDER — HYDROCODONE BITARTRATE AND ACETAMINOPHEN 7.5; 325 MG/1; MG/1
2 TABLET ORAL ONCE
Status: COMPLETED | OUTPATIENT
Start: 2019-11-11 | End: 2019-11-11

## 2019-11-11 RX ORDER — METHYLPREDNISOLONE 4 MG/1
TABLET ORAL
Qty: 1 EACH | Refills: 0 | Status: SHIPPED | OUTPATIENT
Start: 2019-11-11 | End: 2019-12-02

## 2019-11-11 RX ORDER — SODIUM CHLORIDE 0.9 % (FLUSH) 0.9 %
10 SYRINGE (ML) INJECTION AS NEEDED
Status: DISCONTINUED | OUTPATIENT
Start: 2019-11-11 | End: 2019-11-11 | Stop reason: HOSPADM

## 2019-11-11 RX ADMIN — HYDROCODONE BITARTRATE AND ACETAMINOPHEN 2 TABLET: 7.5; 325 TABLET ORAL at 19:49

## 2019-11-11 RX ADMIN — SODIUM CHLORIDE 500 ML: 9 INJECTION, SOLUTION INTRAVENOUS at 17:46

## 2019-11-11 NOTE — ED TRIAGE NOTES
EMS states that wife is concerned about him being dehydrated because he had back surgery 3 days ago and has not been drinking any water, just sips. Wife reports urine is dark in color

## 2019-11-11 NOTE — TELEPHONE ENCOUNTER
Patient had a Right L2-3 laminectomy with a crossover to the left using minimal access spinal technologies done by Dr. Dudley on 11/8/2019.

## 2019-11-11 NOTE — TELEPHONE ENCOUNTER
Patient's wife was given the message from Dr. Dudley. They would prefer to try more fluids first before going to the ER. He is eating and drinking more now. I told her we will send in the MDP but that he should continue with plenty of fluids and if he gets worse at he needs to go to the ER immediatly. She verbalized understanding.

## 2019-11-11 NOTE — TELEPHONE ENCOUNTER
We can try him on a Medrol Dosepak but it sounds like he is getting dehydrated.  He probably needs to go to the emergency room.

## 2019-11-11 NOTE — TELEPHONE ENCOUNTER
Pt's wife Hollie (on[place]a) has some questions about the pt's surgery.        1) Pt is in a lot of pain and the hydrocodone 1 pill every 4 hrs is not working.  Can they up the dosage on this?      2) Pt is not able to get up go to the restroom, so they got him a urinal.  Pt has been using that but his urine is very dark.  She is very concerned about that..   Hollie [phone]

## 2019-11-11 NOTE — ED PROVIDER NOTES
EMERGENCY DEPARTMENT ENCOUNTER    Room Number:  29/29  Date of encounter:  11/11/2019  PCP: Edenilson Henry MD  Historian: Patient, wife      HPI:  Chief Complaint: Possible dehydration, dark urine  A complete HPI/ROS/PMH/PSH/SH/FH are unobtainable due to: None    Context: Olu Izquierdo is a 79 y.o. male who presents to the ED c/o possible dehydration, dark urine.  Patient had back surgery 3 days ago and has not been drinking much fluid.  He states he has not been drinking much because he does not like to get up to urinate.  He now has a urinal and feels like he can urinate easier but did not have a urinal and did not want to get up to make urine.  Wife is concerned that patient may be dehydrated due to lack of intake and the darkness of his urine.  Patient does report some back pain but it is gradually improving.  He is able to ambulate on his own but does have difficulty sitting up due to pain.      PAST MEDICAL HISTORY  Active Ambulatory Problems     Diagnosis Date Noted   • Essential hypertension 10/30/2017   • Hypercholesterolemia 10/30/2017   • Hypokalemia 08/22/2018   • Spinal stenosis of lumbar region with neurogenic claudication 03/28/2019     Resolved Ambulatory Problems     Diagnosis Date Noted   • Acute prostatitis 01/08/2018   • Lower urinary tract infectious disease 05/09/2015     Past Medical History:   Diagnosis Date   • Anesthesia complication    • Arthritis    • Hyperlipidemia    • Hypertension    • Injury of back    • Low back pain    • Numbness    • Spinal stenosis          PAST SURGICAL HISTORY  Past Surgical History:   Procedure Laterality Date   • CATARACT EXTRACTION, BILATERAL     • EPIDURAL BLOCK     • LUMBAR DISCECTOMY     • LUMBAR DISCECTOMY Right 11/8/2019    Procedure: Right Lumbar two-three laminectomy with metrix;  Surgeon: Lius Dudley MD;  Location: [facility] OR;  Service: Neurosurgery         FAMILY HISTORY  Family History   Problem Relation Age of Onset   • Heart disease  Mother    • Hypertension Mother    • Alcohol abuse Father    • Malig Hyperthermia Neg Hx          SOCIAL HISTORY  Social History     Socioeconomic History   • Marital status:      Spouse name: Not on file   • Number of children: 3   • Years of education: 14   • Highest education level: Not asked   Occupational History   • Occupation: RETIRED   Social Needs   • Financial resource strain: Patient refused   • Food insecurity:     Worry: Patient refused     Inability: Patient refused   • Transportation needs:     Medical: Patient refused     Non-medical: Patient refused   Tobacco Use   • Smoking status: Never Smoker   • Smokeless tobacco: Never Used   Substance and Sexual Activity   • Alcohol use: Yes     Comment: 1 a night    • Drug use: No   • Sexual activity: Defer   Social History Narrative    LIVES WITH WIFE         ALLERGIES  Patient has no known allergies.       REVIEW OF SYSTEMS  Review of Systems   Constitutional: Negative.  Negative for fever.   HENT: Negative.  Negative for sore throat.    Eyes: Negative.    Respiratory: Negative.  Negative for cough.    Cardiovascular: Negative.  Negative for chest pain.   Gastrointestinal: Negative.    Genitourinary: Negative.  Negative for difficulty urinating and dysuria.   Musculoskeletal: Negative.  Negative for back pain.        Postsurgical back pain   Skin: Negative.  Negative for rash.   Neurological: Negative.  Negative for headaches.   All other systems reviewed and are negative.          PHYSICAL EXAM    I have reviewed the triage vital signs and nursing notes.    ED Triage Vitals [11/11/19 1637]   Temp Heart Rate Resp BP SpO2   98.9 °F (37.2 °C) 100 16 120/49 95 %      Temp src Heart Rate Source Patient Position BP Location FiO2 (%)   Tympanic -- -- -- --       Physical Exam  GENERAL: not distressed  HENT: nares patent  EYES: no scleral icterus  CV: regular rhythm, regular rate  RESPIRATORY: normal effort, unlabored  ABDOMEN: soft, nontender there is no  CVA tenderness  MUSCULOSKELETAL: no deformity  NEURO: Strength, sensation, and coordination are grossly intact.  Speech and mentation are unremarkable  SKIN: warm, dry      LAB RESULTS  Recent Results (from the past 24 hour(s))   Comprehensive Metabolic Panel    Collection Time: 11/11/19  5:48 PM   Result Value Ref Range    Glucose 137 (H) 65 - 99 mg/dL    BUN 23 8 - 23 mg/dL    Creatinine 1.13 0.76 - 1.27 mg/dL    Sodium 132 (L) 136 - 145 mmol/L    Potassium 4.0 3.5 - 5.2 mmol/L    Chloride 91 (L) 98 - 107 mmol/L    CO2 26.7 22.0 - 29.0 mmol/L    Calcium 9.5 8.6 - 10.5 mg/dL    Total Protein 7.3 6.0 - 8.5 g/dL    Albumin 4.00 3.50 - 5.20 g/dL    ALT (SGPT) 16 1 - 41 U/L    AST (SGOT) 17 1 - 40 U/L    Alkaline Phosphatase 73 39 - 117 U/L    Total Bilirubin 0.5 0.2 - 1.2 mg/dL    eGFR Non African Amer 63 >60 mL/min/1.73    Globulin 3.3 gm/dL    A/G Ratio 1.2 g/dL    BUN/Creatinine Ratio 20.4 7.0 - 25.0    Anion Gap 14.3 5.0 - 15.0 mmol/L   CBC Auto Differential    Collection Time: 11/11/19  5:48 PM   Result Value Ref Range    WBC 13.72 (H) 3.40 - 10.80 10*3/mm3    RBC 4.63 4.14 - 5.80 10*6/mm3    Hemoglobin 14.4 13.0 - 17.7 g/dL    Hematocrit 42.2 37.5 - 51.0 %    MCV 91.1 79.0 - 97.0 fL    MCH 31.1 26.6 - 33.0 pg    MCHC 34.1 31.5 - 35.7 g/dL    RDW 11.8 (L) 12.3 - 15.4 %    RDW-SD 39.3 37.0 - 54.0 fl    MPV 9.6 6.0 - 12.0 fL    Platelets 250 140 - 450 10*3/mm3    Neutrophil % 74.2 42.7 - 76.0 %    Lymphocyte % 8.4 (L) 19.6 - 45.3 %    Monocyte % 14.7 (H) 5.0 - 12.0 %    Eosinophil % 1.2 0.3 - 6.2 %    Basophil % 0.7 0.0 - 1.5 %    Immature Grans % 0.8 (H) 0.0 - 0.5 %    Neutrophils, Absolute 10.19 (H) 1.70 - 7.00 10*3/mm3    Lymphocytes, Absolute 1.15 0.70 - 3.10 10*3/mm3    Monocytes, Absolute 2.01 (H) 0.10 - 0.90 10*3/mm3    Eosinophils, Absolute 0.17 0.00 - 0.40 10*3/mm3    Basophils, Absolute 0.09 0.00 - 0.20 10*3/mm3    Immature Grans, Absolute 0.11 (H) 0.00 - 0.05 10*3/mm3    nRBC 0.0 0.0 - 0.2 /100 WBC    Basic Metabolic Panel    Collection Time: 11/11/19  5:48 PM   Result Value Ref Range    Glucose 137 (H) 65 - 99 mg/dL    BUN 23 8 - 23 mg/dL    Creatinine 1.13 0.76 - 1.27 mg/dL    Sodium 132 (L) 136 - 145 mmol/L    Potassium 4.0 3.5 - 5.2 mmol/L    Chloride 91 (L) 98 - 107 mmol/L    CO2 26.7 22.0 - 29.0 mmol/L    Calcium 9.5 8.6 - 10.5 mg/dL    eGFR Non African Amer 63 >60 mL/min/1.73    BUN/Creatinine Ratio 20.4 7.0 - 25.0    Anion Gap 14.3 5.0 - 15.0 mmol/L   Urinalysis With Microscopic If Indicated (No Culture) - Urine, Clean Catch    Collection Time: 11/11/19  5:54 PM   Result Value Ref Range    Color, UA Yellow Yellow, Straw    Appearance, UA Clear Clear    pH, UA <=5.0 5.0 - 8.0    Specific Gravity, UA 1.026 1.005 - 1.030    Glucose, UA Negative Negative    Ketones, UA 15 mg/dL (1+) (A) Negative    Bilirubin, UA Negative Negative    Blood, UA Negative Negative    Protein, UA Trace (A) Negative    Leuk Esterase, UA Negative Negative    Nitrite, UA Negative Negative    Urobilinogen, UA 0.2 E.U./dL 0.2 - 1.0 E.U./dL       Ordered the above labs and independently reviewed the results.      RADIOLOGY  No Radiology Exams Resulted Within Past 24 Hours    I ordered the above noted radiological studies. Reviewed by me and discussed with radiologist.  See dictation for official radiology interpretation.      PROCEDURES  Procedures      MEDICATIONS GIVEN IN ER    Medications   sodium chloride 0.9 % flush 10 mL (not administered)   sodium chloride 0.9 % bolus 500 mL (0 mL Intravenous Stopped 11/11/19 1086)         PROGRESS, DATA ANALYSIS, CONSULTS, AND MEDICAL DECISION MAKING    All labs have been independently reviewed by me.  All radiology studies have been reviewed by me and discussed with radiologist dictating the report.   EKG's independently viewed and interpreted by me.  Discussion below represents my analysis of pertinent findings related to patient's condition, differential diagnosis, treatment plan and final  disposition.      ED Course as of Nov 11 1918   Mon Nov 11, 2019 1916 Labs been reviewed and are fairly unremarkable BUN and creatinine are within normal limits and urinalysis is also unremarkable.  I do suspect patient had some mild dehydration but I see no indication for admission or further IV fluids at this point.  Will encourage patient drink plenty of fluids and to urinate using urinal when needed.  [DB]      ED Course User Index  [DB] Jose Martin MD       AS OF 7:18 PM VITALS:    BP - 146/80  HR - 94  TEMP - 98.9 °F (37.2 °C) (Tympanic)  O2 SATS - 95%      DIAGNOSIS  Final diagnoses:   Mild dehydration   Dark urine   Postoperative back pain         DISPOSITION  DISCHARGE    Patient discharged in stable condition.    Reviewed implications of results, diagnosis, meds, responsibility to follow up, warning signs and symptoms of possible worsening, potential complications and reasons to return to ER, including increased pain, fever or as needed.    Patient/Family voiced understanding of above instructions.    Discussed plan for discharge, as there is no emergent indication for admission. Patient referred to primary care provider for BP management due to today's BP. Pt/family is agreeable and understands need for follow up and repeat testing.  Pt is aware that discharge does not mean that nothing is wrong but it indicates no emergency is present that requires admission and they must continue care with follow-up as given below or physician of their choice.     FOLLOW-UP  No follow-up provider specified.       Medication List      No changes were made to your prescriptions during this visit.                Jose Martin MD  11/11/19 1925

## 2019-11-12 ENCOUNTER — TELEPHONE (OUTPATIENT)
Dept: NEUROSURGERY | Facility: CLINIC | Age: 79
End: 2019-11-12

## 2019-11-12 NOTE — TELEPHONE ENCOUNTER
Pt had lumbar laminectomy 11/8 with Dr. Dudley. He was prescribed methylprednisolone and wondered if that took the place of hydrocodone 5 mg or in addition to that? Pt number is [phone]

## 2019-11-12 NOTE — DISCHARGE INSTRUCTIONS
Drink plenty of fluids.  Take your medications as directed.  Contact Dr. Dudley if not improved in the next several days

## 2019-11-12 NOTE — TELEPHONE ENCOUNTER
Pt's wife called and I told her that the MedrolDosepak was sent to their pharmacy. She said she would get that

## 2019-11-13 ENCOUNTER — TELEPHONE (OUTPATIENT)
Dept: FAMILY MEDICINE CLINIC | Facility: CLINIC | Age: 79
End: 2019-11-13

## 2019-11-13 ENCOUNTER — TELEPHONE (OUTPATIENT)
Dept: NEUROSURGERY | Facility: CLINIC | Age: 79
End: 2019-11-13

## 2019-11-13 NOTE — TELEPHONE ENCOUNTER
Patients wife called to report that patient has been hallucinating and hasn't been able to sleep since last Thursday 11/07/19. I asked if he was taking his pain medication and she said yes. I then went to talk to Carmen about this patient and she picked the phone up and advised the wife to take patient to the ER because it could be a possible UTI. Patient was in ER on 11/11 for Mild dehydration. Ms. Izquierdo reported to Carmen that patient is still a little dehydrate. Patient was advised to drink plenty of fluids and start to take pain medication q6hrs. She was advised to take patient to ER if patient gets worse.

## 2019-11-13 NOTE — TELEPHONE ENCOUNTER
Pt had back surgery last Friday with Dr. Dudley.   Pt is still in pain and having trouble sleeping as well. She has called Dr. Dudley' office and they have sent him some additional medications. They told her to contact her PCP and see if we would be willing to give him something safe to take for sleep.

## 2019-11-14 RX ORDER — RAMIPRIL 10 MG/1
CAPSULE ORAL
Qty: 180 CAPSULE | Refills: 1 | Status: SHIPPED | OUTPATIENT
Start: 2019-11-14 | End: 2020-05-18

## 2019-11-14 NOTE — TELEPHONE ENCOUNTER
Please let him know there is no safe sleeping medicine I can give him with the hydrocodone prescribed by the neurosurgeons.

## 2019-11-15 ENCOUNTER — TELEPHONE (OUTPATIENT)
Dept: NEUROSURGERY | Facility: CLINIC | Age: 79
End: 2019-11-15

## 2019-11-15 RX ORDER — HYDROCODONE BITARTRATE AND ACETAMINOPHEN 5; 325 MG/1; MG/1
1 TABLET ORAL EVERY 6 HOURS PRN
Qty: 20 TABLET | Refills: 0 | Status: CANCELLED | OUTPATIENT
Start: 2019-11-15

## 2019-11-15 RX ORDER — HYDROCODONE BITARTRATE AND ACETAMINOPHEN 5; 325 MG/1; MG/1
1 TABLET ORAL EVERY 6 HOURS PRN
Qty: 20 TABLET | Refills: 0 | Status: SHIPPED | OUTPATIENT
Start: 2019-11-15 | End: 2019-12-02

## 2019-11-15 NOTE — TELEPHONE ENCOUNTER
Attempted to call the patient's family, the phone rings and then it sounds as though someone picks up and hangs up. I left a message on her cell phone to call the office.    Attempted to call the patient and his wife. It sounds as though someone will  and then hangup. I also LVM on patient's cell phone. Dr. Dudley does not give only 10 hydrocodone. There is other telephone messages from her PCP that she may be confusing us with. Per Dr. Dudley he can have his 2nd [place] RX but it will be his last RX. It will be #20 with no refill and he will only be able to take it every 6-8 hours prn. RX was sent to his pharmacy.

## 2019-11-15 NOTE — TELEPHONE ENCOUNTER
Pt's wife Hollie says she spoke to someone on Tuesday here. She was told that she could get 10 pills of hydrocodone and that someone would have to pick it up. Pt does not have any hydrocodone left.  Hollie [phone]    Please let pt know when it is sent in

## 2019-11-25 RX ORDER — ATORVASTATIN CALCIUM 10 MG/1
10 TABLET, FILM COATED ORAL DAILY
Qty: 90 TABLET | Refills: 0 | Status: SHIPPED | OUTPATIENT
Start: 2019-11-25 | End: 2020-02-24

## 2019-11-25 NOTE — PROGRESS NOTES
Subjective   Patient ID: Olu Izquierdo is a 79 y.o. male is here today for follow-up. He is 2 weeks out from an right L2-3 laminectomy with cross over to the right by Dr. Dudley 11/8/19. Patients wife called 11/11/19 with concerns of patients Hydrocodone and darken urine.  He went to the ER to be evaluated and was found to have some mild dehydration and encouraged to drink plenty of fluids.     He states over the past week he has been feeling much better. The MDP took the edge off of pain. His appetite is better. He has been urinating ok. He did have trouble with constipation. He took laxatives which helped.  He denies any incision problems. He complains of pain in R buttock and thigh. Mr. Izquierdo is out of narcotic pain medications. He takes Tylenol PM at night.     Back Pain   The quality of the pain is described as aching. The pain is at a severity of 3/10. The pain is mild. Associated symptoms include leg pain.   Leg Pain    The pain is present in the right thigh. The pain is at a severity of 3/10. The pain is mild. The pain has been constant since onset. He has tried acetaminophen for the symptoms.     He returns to the office today for first postop visit status post L2-3 laminectomy with Dr. Dudley on November 8, 2018.  He has had no new imaging.  He went to the ER on November 11 for hallucinations and was treated for mild dehydration.  Preop he had low back and bilateral thigh pain with tingling.    Today, he reports that he has had a rough time after surgery but overall is doing better now.  The pain in his legs has greatly improved.  He continues to have one area of discomfort in the mid lateral right thigh.  Low back pain is also mostly resolved but he does have expected postoperative muscular discomfort around the incision site.  He denies any new problems.  He would like to do physical therapy but is unable to leave his home due to his chronically ill wife.  He is off all narcotics.  He is taking Tylenol PM  "at night.  He is overall pleased with his status.  His son unexpectedly passed away 1 week before surgery and he feels that this is causing a setback in his recovery.    He presents unaccompanied.      /64   Pulse 101   Ht 182.9 cm (72\")   Wt 84 kg (185 lb 3.2 oz)   BMI 25.12 kg/m²       The following portions of the patient's history were reviewed and updated as appropriate: allergies, current medications, past family history, past medical history, past social history and past surgical history.    Review of Systems   Musculoskeletal: Positive for back pain (R thigh/ buttock).   All other systems reviewed and are negative.      Objective   Physical Exam   Constitutional: He is oriented to person, place, and time. Vital signs are normal. He appears well-developed and well-nourished. He is cooperative.  Non-toxic appearance. He does not have a sickly appearance. He does not appear ill.   Very pleasant well-appearing older gentleman   HENT:   Head: Normocephalic and atraumatic.   Eyes: EOM are normal.   Neck: Neck supple.   Pulmonary/Chest: Effort normal.   Musculoskeletal: Normal range of motion. He exhibits tenderness (Pinpoint tenderness right low back just above the hip.). He exhibits no deformity.        Lumbar back: He exhibits tenderness. He exhibits normal range of motion, no bony tenderness and no pain.   Strength is equal and intact bilateral lower extremities  Deferred lumbar range of motion exam   Neurological: He is alert and oriented to person, place, and time. He has normal strength. He displays no tremor and normal reflexes. No sensory deficit. He exhibits normal muscle tone. Gait abnormal. Coordination normal. GCS eye subscore is 4. GCS verbal subscore is 5. GCS motor subscore is 6.   Gait is slow, purposeful and wide-based, using a walker for stability     Skin: Skin is warm and dry.   Well-healing lumbar incision site, flat, normal surrounding skin   Psychiatric: He has a normal mood and " affect. His behavior is normal. Thought content normal.   Vitals reviewed.    Neurologic Exam     Mental Status   Oriented to person, place, and time.     Cranial Nerves     CN III, IV, VI   Extraocular motions are normal.     Motor Exam     Strength   Strength 5/5 throughout.       Assessment/Plan   Independent Review of Radiographic Studies:    No new imaging    Medical Decision Making:    He returns to the office today for first postop visit status post lumbar decompression for history of low back and leg pain.  Exam as noted above, no red flags.  He is doing well but has had some setbacks after surgery due to dehydration and hallucinations.  He is currently off narcotics and is feeling better mentally.  He does have expected low back discomfort, worse around the incision site.  I suggested that he may benefit from over-the-counter anti-inflammatories at this point, if these medications are fine to take from a primary care standpoint.  I do not want to give him any more narcotics as his pain overall is stable and due to the side effects.    He has had a rough go of it, especially given the unexpected loss of his son shortly before surgery.  He also feels that he should be more advanced at this point that he currently is.  I explained that this is a very individualized healing process.  Strength in his legs appears to be normal but he does have ongoing balance and gait instability.  I have ordered in-home physical therapy as he is unable to leave his home due to his chronically ill wife.  He is currently not driving.  We will see him back in 6 weeks for clinical follow-up.  He is okay lifting upwards of 10 to 15 pounds at this point.  Hopefully he will be able to wean off the Rollator soon.    Plan: Referral to home health for physical therapy, return office in 6 weeks for clinical follow-up  Diagnoses and all orders for this visit:    Spinal stenosis of lumbar region with neurogenic claudication  -     Ambulatory  Referral to Home Health      Return in about 6 weeks (around 1/7/2020).

## 2019-11-26 ENCOUNTER — OFFICE VISIT (OUTPATIENT)
Dept: NEUROSURGERY | Facility: CLINIC | Age: 79
End: 2019-11-26

## 2019-11-26 VITALS
SYSTOLIC BLOOD PRESSURE: 122 MMHG | HEIGHT: 72 IN | BODY MASS INDEX: 25.09 KG/M2 | HEART RATE: 101 BPM | WEIGHT: 185.2 LBS | DIASTOLIC BLOOD PRESSURE: 64 MMHG

## 2019-11-26 DIAGNOSIS — M48.062 SPINAL STENOSIS OF LUMBAR REGION WITH NEUROGENIC CLAUDICATION: Primary | ICD-10-CM

## 2019-11-26 PROCEDURE — 99024 POSTOP FOLLOW-UP VISIT: CPT | Performed by: NURSE PRACTITIONER

## 2019-12-02 ENCOUNTER — OFFICE VISIT (OUTPATIENT)
Dept: FAMILY MEDICINE CLINIC | Facility: CLINIC | Age: 79
End: 2019-12-02

## 2019-12-02 VITALS
BODY MASS INDEX: 25.49 KG/M2 | DIASTOLIC BLOOD PRESSURE: 73 MMHG | RESPIRATION RATE: 16 BRPM | SYSTOLIC BLOOD PRESSURE: 123 MMHG | TEMPERATURE: 97.3 F | HEART RATE: 96 BPM | WEIGHT: 188.2 LBS | HEIGHT: 72 IN | OXYGEN SATURATION: 97 %

## 2019-12-02 DIAGNOSIS — I10 ESSENTIAL HYPERTENSION: Primary | ICD-10-CM

## 2019-12-02 PROCEDURE — 99213 OFFICE O/P EST LOW 20 MIN: CPT | Performed by: FAMILY MEDICINE

## 2019-12-02 RX ORDER — HYDROCHLOROTHIAZIDE 12.5 MG/1
12.5 TABLET ORAL DAILY
COMMUNITY
End: 2020-04-20

## 2019-12-02 NOTE — PROGRESS NOTES
"Subjective   Olu Izquierdo is a 79 y.o. male.     Chief Complaint   Patient presents with   • Hypertension     f/u HTN and lab f/u        History of Present Illness    Follow-up hypertension.  He continues hydralazine, low-dose hydrochlorothiazide 12.5 mg a day, and 50 mg of Spironolactone.  His blood pressures are improved.  They are now mostly in the 120s systolic.  He lost some weight after surgery.  He is also going through grief, his son  of complications from alcoholism and liver trouble.  He is grieving but not depressed.  The pain in the back is improved after surgery.  He is walking better walking straighter.  He no longer has as much bilateral posterior thigh pain.  No cardiovascular symptoms.      The following portions of the patient's history were reviewed and updated as appropriate: allergies, current medications, past family history, past medical history, past social history, past surgical history and problem list.          Review of Systems   Constitutional: Negative.    Respiratory: Negative.    Cardiovascular: Negative.    Musculoskeletal: Positive for back pain.       Objective   Blood pressure 123/73, pulse 96, temperature 97.3 °F (36.3 °C), resp. rate 16, height 182.9 cm (72\"), weight 85.4 kg (188 lb 3.2 oz), SpO2 97 %.  Physical Exam   Constitutional: He appears well-developed and well-nourished. No distress.   Neck: No thyromegaly present.   Cardiovascular: Normal rate, regular rhythm, normal heart sounds and intact distal pulses.   Pulmonary/Chest: Effort normal and breath sounds normal.   Musculoskeletal: He exhibits no edema.   Skin: Skin is warm and dry.   Psychiatric: He has a normal mood and affect. His behavior is normal. Judgment and thought content normal.   Nursing note and vitals reviewed.      Assessment/Plan   Olu was seen today for hypertension.    Diagnoses and all orders for this visit:    Essential hypertension  -     Basic Metabolic Panel      Hypertension.  Much better " control.  Rechecking BMP today.  He was in the emergency room at one point for some dehydration a couple weeks ago after the surgery.  His sodium was slightly low.  Rechecking today.  If sodium is still low, stop hydrochlorothiazide continue hydralazine and Spironolactone.  He no longer has hypokalemia since starting the spironolactone.  He is off potassium supplementation.  Otherwise I will see him back in about 3 months for recheck.

## 2019-12-03 LAB
BUN SERPL-MCNC: 16 MG/DL (ref 8–27)
BUN/CREAT SERPL: 21 (ref 10–24)
CALCIUM SERPL-MCNC: 9.6 MG/DL (ref 8.6–10.2)
CHLORIDE SERPL-SCNC: 99 MMOL/L (ref 96–106)
CO2 SERPL-SCNC: 21 MMOL/L (ref 20–29)
CREAT SERPL-MCNC: 0.76 MG/DL (ref 0.76–1.27)
GLUCOSE SERPL-MCNC: 93 MG/DL (ref 65–99)
POTASSIUM SERPL-SCNC: 4 MMOL/L (ref 3.5–5.2)
SODIUM SERPL-SCNC: 139 MMOL/L (ref 134–144)

## 2019-12-16 ENCOUNTER — OFFICE VISIT (OUTPATIENT)
Dept: FAMILY MEDICINE CLINIC | Facility: CLINIC | Age: 79
End: 2019-12-16

## 2019-12-16 VITALS
TEMPERATURE: 97.7 F | DIASTOLIC BLOOD PRESSURE: 79 MMHG | BODY MASS INDEX: 26.15 KG/M2 | WEIGHT: 193.1 LBS | SYSTOLIC BLOOD PRESSURE: 142 MMHG | HEIGHT: 72 IN | HEART RATE: 82 BPM | OXYGEN SATURATION: 98 %

## 2019-12-16 DIAGNOSIS — E78.00 HYPERCHOLESTEROLEMIA: ICD-10-CM

## 2019-12-16 DIAGNOSIS — M48.062 SPINAL STENOSIS OF LUMBAR REGION WITH NEUROGENIC CLAUDICATION: ICD-10-CM

## 2019-12-16 DIAGNOSIS — I10 ESSENTIAL HYPERTENSION: ICD-10-CM

## 2019-12-16 DIAGNOSIS — Z00.00 MEDICARE ANNUAL WELLNESS VISIT, SUBSEQUENT: Primary | ICD-10-CM

## 2019-12-16 PROBLEM — E87.6 HYPOKALEMIA: Status: RESOLVED | Noted: 2018-08-22 | Resolved: 2019-12-16

## 2019-12-16 PROCEDURE — 90732 PPSV23 VACC 2 YRS+ SUBQ/IM: CPT | Performed by: FAMILY MEDICINE

## 2019-12-16 PROCEDURE — 99214 OFFICE O/P EST MOD 30 MIN: CPT | Performed by: FAMILY MEDICINE

## 2019-12-16 PROCEDURE — G0439 PPPS, SUBSEQ VISIT: HCPCS | Performed by: FAMILY MEDICINE

## 2019-12-16 PROCEDURE — G0009 ADMIN PNEUMOCOCCAL VACCINE: HCPCS | Performed by: FAMILY MEDICINE

## 2019-12-16 NOTE — PROGRESS NOTES
The ABCs of the Annual Wellness Visit  Subsequent Medicare Wellness Visit    Chief Complaint   Patient presents with   • Medicare Wellness-subsequent     follow up labs    • Back Pain     is still bothering him        Subjective   History of Present Illness:  Olu Izquierdo is a 79 y.o. male who presents for a Subsequent Medicare Wellness Visit.    HEALTH RISK ASSESSMENT    Recent Hospitalizations:  Recently treated at the following:  [facility]    Current Medical Providers:  Patient Care Team:  Edenilson Henry MD as PCP - General (Family Medicine)  Edenilson Henry MD as PCP - Claims Attributed    Smoking Status:  Social History     Tobacco Use   Smoking Status Never Smoker   Smokeless Tobacco Never Used       Alcohol Consumption:  Social History     Substance and Sexual Activity   Alcohol Use Yes    Comment: 1 a night        Depression Screen:   PHQ-2/PHQ-9 Depression Screening 12/16/2019   Little interest or pleasure in doing things 0   Feeling down, depressed, or hopeless 1   Trouble falling or staying asleep, or sleeping too much -   Feeling tired or having little energy -   Poor appetite or overeating -   Feeling bad about yourself - or that you are a failure or have let yourself or your family down -   Trouble concentrating on things, such as reading the newspaper or watching television -   Moving or speaking so slowly that other people could have noticed. Or the opposite - being so fidgety or restless that you have been moving around a lot more than usual -   Thoughts that you would be better off dead, or of hurting yourself in some way -   Total Score 1   If you checked off any problems, how difficult have these problems made it for you to do your work, take care of things at home, or get along with other people? -       Fall Risk Screen:  STEADI Fall Risk Assessment was completed, and patient is at LOW risk for falls.Assessment completed on:12/2/2019    Health Habits and Functional and  Cognitive Screening:  Functional & Cognitive Status 12/16/2019   Do you have difficulty preparing food and eating? No   Do you have difficulty bathing yourself, getting dressed or grooming yourself? No   Do you have difficulty using the toilet? No   Do you have difficulty moving around from place to place? No   Do you have trouble with steps or getting out of a bed or a chair? No   Current Diet Limited Junk Food   Dental Exam Up to date   Eye Exam Up to date   Exercise (times per week) 1 times per week   Current Exercise Activities Include Walking   Do you need help using the phone?  No   Are you deaf or do you have serious difficulty hearing?  No   Do you need help with transportation? No   Do you need help shopping? No   Do you need help preparing meals?  No   Do you need help with housework?  No   Do you need help with laundry? No   Do you need help taking your medications? No   Do you need help managing money? No   Do you ever drive or ride in a car without wearing a seat belt? No   Have you felt unusual stress, anger or loneliness in the last month? Yes   Who do you live with? Spouse   If you need help, do you have trouble finding someone available to you? No   Have you been bothered in the last four weeks by sexual problems? No   Do you have difficulty concentrating, remembering or making decisions? No         Does the patient have evidence of cognitive impairment? No    Asprin use counseling:Does not need ASA (and currently is not on it)    Age-appropriate Screening Schedule:  Refer to the list below for future screening recommendations based on patient's age, sex and/or medical conditions. Orders for these recommended tests are listed in the plan section. The patient has been provided with a written plan.    Health Maintenance   Topic Date Due   • TDAP/TD VACCINES (1 - Tdap) 09/05/1951   • ZOSTER VACCINE (1 of 2) 09/05/1991   • PNEUMOCOCCAL VACCINES (65+ LOW/MEDIUM RISK) (2 of 2 - PPSV23) 11/20/2019   •  LIPID PANEL  2020   • INFLUENZA VACCINE  Completed          The following portions of the patient's history were reviewed and updated as appropriate: allergies, current medications, past family history, past medical history, past social history, past surgical history and problem list.    Outpatient Medications Prior to Visit   Medication Sig Dispense Refill   • acetaminophen (TYLENOL) 500 MG tablet Take 500 mg by mouth Daily. PT STATES HE TAKES 3 TABLETS IN THE AM     • atorvastatin (LIPITOR) 10 MG tablet TAKE 1 TABLET BY MOUTH DAILY 90 tablet 0   • diphenhydrAMINE-acetaminophen (TYLENOL PM)  MG tablet per tablet Take 2 tablets by mouth At Night As Needed for Sleep.     • hydrALAZINE (APRESOLINE) 25 MG tablet Take 25 mg by mouth 2 (Two) Times a Day.     • hydroCHLOROthiazide (HYDRODIURIL) 12.5 MG tablet Take 12.5 mg by mouth Daily.     • metoprolol succinate XL (TOPROL-XL) 25 MG 24 hr tablet TAKE 1 TABLET BY MOUTH DAILY (Patient taking differently: Take 25 mg by mouth Every Night.) 90 tablet 1   • ramipril (ALTACE) 10 MG capsule TAKE 1 CAPSULE BY MOUTH TWICE DAILY 180 capsule 1   • spironolactone (ALDACTONE) 50 MG tablet Take 1 tablet by mouth Daily. 90 tablet 1     No facility-administered medications prior to visit.        Patient Active Problem List   Diagnosis   • Essential hypertension   • Hypercholesterolemia   • Hypokalemia   • Spinal stenosis of lumbar region with neurogenic claudication       Advanced Care Planning:  Patient has an advance directive - a copy has been provided and is visible in patient header    Review of Systems    Compared to one year ago, the patient feels his physical health is better.  Compared to one year ago, the patient feels his mental health is worse.... Son .     Reviewed chart for potential of high risk medication in the elderly: yes  Reviewed chart for potential of harmful drug interactions in the elderly:yes    Objective         Vitals:    19 0850   BP:  "142/79   Pulse: 82   Temp: 97.7 °F (36.5 °C)   TempSrc: Oral   SpO2: 98%   Weight: 87.6 kg (193 lb 1.6 oz)   Height: 182.9 cm (72.01\")       Body mass index is 26.18 kg/m².  Discussed the patient's BMI with him. The BMI is in the acceptable range.    Physical Exam    Lab Results   Component Value Date    GLU 93 12/02/2019        Assessment/Plan   Medicare Risks and Personalized Health Plan  CMS Preventative Services Quick Reference  Pneumococcal risk.  Pneumovax today.  Shingles risk.  Recommend Shingrix at retail pharmacy  Patient has not had any colon cancer screening in the past and declined today.      The above risks/problems have been discussed with the patient.  Pertinent information has been shared with the patient in the After Visit Summary.  Follow up plans and orders are seen below in the Assessment/Plan Section.    Diagnoses and all orders for this visit:    1. Medicare annual wellness visit, subsequent (Primary)    2. Spinal stenosis of lumbar region with neurogenic claudication    3. Hypercholesterolemia    4. Essential hypertension    Other orders  -     Pneumococcal Polysaccharide Vaccine 23-Valent Greater Than or Equal To 1yo Subcutaneous / IM      Follow Up:  No follow-ups on file.     An After Visit Summary and PPPS were given to the patient.             "

## 2019-12-16 NOTE — PROGRESS NOTES
"Subjective   Olu Izquierdo is a 79 y.o. male.     Medicare Wellness-subsequent (follow up labs ) and Back Pain (is still bothering him )    History of Present Illness    Hypertension follow up. Doing well with current medication which he is taking as directed. No known high or low blood pressure episodes.  Blood pressures running normal at home.  The hypokalemia from the previous dehydration and hyponatremia is resolved.  He continues hydralazine, hydrochlorothiazide, metoprolol, ramipril, and spironolactone 50 mg a day.  Repeat potassium was normal about a week ago.  No cardiovascular or neurological symptoms. Today's BP: 142/79.      Hyperlipidemia follow up. He is taking statin medication without complaint. No myopathy symptoms.     Lab Results   Component Value Date    CHLPL 158 05/13/2019    TRIG 111 05/13/2019    HDL 61 (H) 05/13/2019    LDL 75 05/13/2019     Spinal stenosis with lumbar neurogenic claudication.  Recent surgery about 5 weeks ago.  He states he is ambulating better with the pain being decreased.  He continues to grieve over the death of his son.  He is also worried about his wife and her grieving process.  No major depression symptoms now.  He is using a cane to walk and feels better.    The following portions of the patient's history were reviewed and updated as appropriate: allergies, current medications, past family history, past medical history, past social history, past surgical history and problem list.      Review of Systems   Constitutional: Negative.    Respiratory: Negative.    Cardiovascular: Negative.    Musculoskeletal: Positive for back pain.   Neurological: Negative for weakness.       Objective   Blood pressure 142/79, pulse 82, temperature 97.7 °F (36.5 °C), temperature source Oral, height 182.9 cm (72.01\"), weight 87.6 kg (193 lb 1.6 oz), SpO2 98 %.  Physical Exam   Constitutional: He appears well-developed and well-nourished. No distress.   Neck: No thyromegaly present. "   Cardiovascular: Normal rate, regular rhythm, normal heart sounds and intact distal pulses.   Pulmonary/Chest: Effort normal and breath sounds normal.   Musculoskeletal: He exhibits no edema.   Gait nonantalgic.  The lumbar spine reveals a small surgical wound that appears to be healing very well.   Skin: Skin is warm and dry.   Psychiatric: He has a normal mood and affect. His behavior is normal. Judgment and thought content normal.   Nursing note and vitals reviewed.      Assessment/Plan   Olu was seen today for medicare wellness-subsequent and back pain.    Diagnoses and all orders for this visit:    Medicare annual wellness visit, subsequent    Spinal stenosis of lumbar region with neurogenic claudication    Hypercholesterolemia    Essential hypertension  -     Comprehensive Metabolic Panel; Future    Other orders  -     Pneumococcal Polysaccharide Vaccine 23-Valent Greater Than or Equal To 1yo Subcutaneous / IM    Spinal stenosis.  Recent surgery.  And well.  He will be following up with his neurosurgeon soon.    Hyperlipidemia.  Continue atorvastatin.    Hypertension with resolved hypokalemia.  BMP recently normal.  Continue on medication as is.  I will see him back in 4 months for blood pressure check with lab work prior.

## 2020-01-02 ENCOUNTER — TELEPHONE (OUTPATIENT)
Dept: NEUROSURGERY | Facility: CLINIC | Age: 80
End: 2020-01-02

## 2020-01-02 NOTE — PROGRESS NOTES
Subjective   Patient ID: Olu Izquierdo is a 79 y.o. male is here today for a p/o 2  L2/3 laminectomy on 11/8/19 with Dr Dudley.  Pt last seen on 11/26/19 and reported feeling much better, however he was still having back and R leg pain.  Today, Pt states he still has back pain, but leg pain has resolved.    History of Present Illness  He denies leg pain, but the back pain bothers him with prolonged standing and walking. He has occasional muscle cramp in the left leg. No swelling. He uses cane for balance but denies weakness or falls. No incisional issues. He is off pain meds now. Hamstrings are tight.    The following portions of the patient's history were reviewed and updated as appropriate: allergies, current medications and problem list.    Review of Systems   Constitutional: Positive for activity change (better).   Genitourinary: Negative for difficulty urinating and enuresis.   Musculoskeletal: Positive for back pain and gait problem.   Neurological: Positive for weakness (back) and numbness.   Psychiatric/Behavioral: Negative for sleep disturbance.       Objective   Physical Exam   Constitutional: He appears well-developed and well-nourished.   Pulmonary/Chest: Effort normal.   Musculoskeletal:        Lumbar back: He exhibits decreased range of motion. He exhibits no tenderness, no bony tenderness and no pain.   Hamstrings are very tight   Neurological: Gait normal.   Skin: Skin is warm and dry.   Well-healed right of midline upper lumbar incision   Vitals reviewed.    Neurologic Exam     Mental Status   Attention: normal. Concentration: normal.   Level of consciousness: alert  Normal comprehension.     Motor Exam   Muscle bulk: normal  5/5 parth LEs     Sensory Exam   Right leg light touch: normal  Left leg light touch: chronic numbness due to old injury.    Gait, Coordination, and Reflexes     Gait  Gait: normal (stable with straight cane)      Assessment/Plan   Independent Review of Radiographic Studies:    No  new imaging    Medical Decision Making:    Patient now 2 months after 1 level lumbar decompression.  Leg pain has resolved.  He continues to have back discomfort particularly with prolonged standing or walking.  He is not bothered by position change so much.  This pain is not significantly different than preop.  He is overall pleased with outcome of his surgery.  His hamstrings are very tight he is bothered by this.  He wonders if physical therapy would be helpful.  His exam is as noted above with normal strength.  He is chronic sensation loss in the left lower extremity secondary to prior injury.  His incision is well-healed.  He does have quite a bit of hamstring tightness.  I would do recommend that he begin some physical therapy.  Have ordered this.  I would like for him to get lumbar x-rays with flexion-extension does ensure that he has not developed any instability due to his ongoing back pain, but I explained to him that unfortunately, surgery in this manner is not directly intended to benefit chronic back pain.  Hopefully, the physical therapy will help.  Of advised not to do any lifting more than 30 pounds for the next 1 month.  He will return office as needed as long as his x-rays look okay today.  I will call him with those results.  He has been advised to contact us if he has any progressive back pain or any new leg numbness tingling or weakness.    Plan: Return office as needed.  Lumbar x-rays today.  Physical therapy for 1 month.    Olu was seen today for post-op.    Diagnoses and all orders for this visit:    Postop check  -     XR Spine Lumbar Complete With Flex & Ext; Future  -     Ambulatory Referral to Physical Therapy Evaluate and treat, POST OP; Stretching, ROM, Strengthening; Full weight bearing    Chronic midline low back pain without sciatica  -     XR Spine Lumbar Complete With Flex & Ext; Future  -     Ambulatory Referral to Physical Therapy Evaluate and treat, POST OP; Stretching, ROM,  Strengthening; Full weight bearing      Return if symptoms worsen or fail to improve.

## 2020-01-02 NOTE — TELEPHONE ENCOUNTER
Patient's wife called the office back and had said that [facility] has been out to the house and Olu has not decided on wihat he is going to do.  I let the wife know that once he makes a decision to give [facility] a call and they will go from there and to call and let us know.  The patient's wife said he has an appointment on 1-13-20 and could they update us then and I said that would be fine.

## 2020-01-13 ENCOUNTER — HOSPITAL ENCOUNTER (OUTPATIENT)
Dept: GENERAL RADIOLOGY | Facility: HOSPITAL | Age: 80
Discharge: HOME | End: 2020-01-13
Admitting: NURSE PRACTITIONER

## 2020-01-13 ENCOUNTER — OFFICE VISIT (OUTPATIENT)
Dept: NEUROSURGERY | Facility: CLINIC | Age: 80
End: 2020-01-13

## 2020-01-13 VITALS
HEIGHT: 72 IN | DIASTOLIC BLOOD PRESSURE: 56 MMHG | BODY MASS INDEX: 26.68 KG/M2 | SYSTOLIC BLOOD PRESSURE: 130 MMHG | HEART RATE: 84 BPM | WEIGHT: 197 LBS

## 2020-01-13 DIAGNOSIS — Z09 POSTOP CHECK: ICD-10-CM

## 2020-01-13 DIAGNOSIS — G89.29 CHRONIC MIDLINE LOW BACK PAIN WITHOUT SCIATICA: ICD-10-CM

## 2020-01-13 DIAGNOSIS — M54.50 CHRONIC MIDLINE LOW BACK PAIN WITHOUT SCIATICA: ICD-10-CM

## 2020-01-13 DIAGNOSIS — Z09 POSTOP CHECK: Primary | ICD-10-CM

## 2020-01-13 PROBLEM — M48.062 SPINAL STENOSIS OF LUMBAR REGION WITH NEUROGENIC CLAUDICATION: Status: RESOLVED | Noted: 2019-03-28 | Resolved: 2020-01-13

## 2020-01-13 PROCEDURE — 99024 POSTOP FOLLOW-UP VISIT: CPT | Performed by: NURSE PRACTITIONER

## 2020-01-13 PROCEDURE — 72114 X-RAY EXAM L-S SPINE BENDING: CPT

## 2020-01-14 ENCOUNTER — TELEPHONE (OUTPATIENT)
Dept: NEUROSURGERY | Facility: CLINIC | Age: 80
End: 2020-01-14

## 2020-01-14 NOTE — TELEPHONE ENCOUNTER
Please notify patient that I reviewed his lumbar x-rays and although he has multiple levels of degenerative changes, there are no concerning findings.  There is no abnormal movement when he flexes and extends.  Okay to begin physical therapy.  Follow-up if needed.

## 2020-01-14 NOTE — TELEPHONE ENCOUNTER
----- Message from KRISHAN Mendoza sent at 1/13/2020  4:30 PM EST -----  Regarding: Xrays  Call patient with x-ray results.

## 2020-01-20 RX ORDER — METOPROLOL SUCCINATE 25 MG/1
25 TABLET, EXTENDED RELEASE ORAL DAILY
Qty: 90 TABLET | Refills: 1 | Status: SHIPPED | OUTPATIENT
Start: 2020-01-20 | End: 2020-07-10

## 2020-02-24 RX ORDER — ATORVASTATIN CALCIUM 10 MG/1
10 TABLET, FILM COATED ORAL DAILY
Qty: 90 TABLET | Refills: 1 | Status: SHIPPED | OUTPATIENT
Start: 2020-02-24 | End: 2020-08-14

## 2020-03-10 RX ORDER — HYDRALAZINE HYDROCHLORIDE 25 MG/1
TABLET, FILM COATED ORAL
Qty: 180 TABLET | Refills: 1 | Status: SHIPPED | OUTPATIENT
Start: 2020-03-10 | End: 2020-09-03

## 2020-03-15 ENCOUNTER — RESULTS ENCOUNTER (OUTPATIENT)
Dept: FAMILY MEDICINE CLINIC | Facility: CLINIC | Age: 80
End: 2020-03-15

## 2020-03-15 DIAGNOSIS — I10 ESSENTIAL HYPERTENSION: ICD-10-CM

## 2020-03-25 RX ORDER — SPIRONOLACTONE 50 MG/1
50 TABLET, FILM COATED ORAL DAILY
Qty: 90 TABLET | Refills: 1 | Status: SHIPPED | OUTPATIENT
Start: 2020-03-25 | End: 2020-09-03

## 2020-04-20 RX ORDER — HYDROCHLOROTHIAZIDE 12.5 MG/1
TABLET ORAL
Qty: 90 TABLET | Refills: 0 | Status: SHIPPED | OUTPATIENT
Start: 2020-04-20 | End: 2020-07-10

## 2020-05-18 RX ORDER — RAMIPRIL 10 MG/1
CAPSULE ORAL
Qty: 180 CAPSULE | Refills: 1 | Status: SHIPPED | OUTPATIENT
Start: 2020-05-18 | End: 2020-09-03

## 2020-06-10 LAB
ALBUMIN SERPL-MCNC: 4.7 G/DL (ref 3.5–5.2)
ALBUMIN/GLOB SERPL: 1.8 G/DL
ALP SERPL-CCNC: 76 U/L (ref 39–117)
ALT SERPL-CCNC: 22 U/L (ref 1–41)
AST SERPL-CCNC: 20 U/L (ref 1–40)
BILIRUB SERPL-MCNC: 0.8 MG/DL (ref 0.2–1.2)
BUN SERPL-MCNC: 26 MG/DL (ref 8–23)
BUN/CREAT SERPL: 23.4 (ref 7–25)
CALCIUM SERPL-MCNC: 10.3 MG/DL (ref 8.6–10.5)
CHLORIDE SERPL-SCNC: 101 MMOL/L (ref 98–107)
CO2 SERPL-SCNC: 28.4 MMOL/L (ref 22–29)
CREAT SERPL-MCNC: 1.11 MG/DL (ref 0.76–1.27)
GLOBULIN SER CALC-MCNC: 2.6 GM/DL
GLUCOSE SERPL-MCNC: 105 MG/DL (ref 65–99)
POTASSIUM SERPL-SCNC: 5 MMOL/L (ref 3.5–5.2)
PROT SERPL-MCNC: 7.3 G/DL (ref 6–8.5)
SODIUM SERPL-SCNC: 138 MMOL/L (ref 136–145)

## 2020-06-15 ENCOUNTER — OFFICE VISIT (OUTPATIENT)
Dept: FAMILY MEDICINE CLINIC | Facility: CLINIC | Age: 80
End: 2020-06-15

## 2020-06-15 VITALS
WEIGHT: 200 LBS | SYSTOLIC BLOOD PRESSURE: 139 MMHG | DIASTOLIC BLOOD PRESSURE: 71 MMHG | HEIGHT: 72 IN | HEART RATE: 91 BPM | BODY MASS INDEX: 27.09 KG/M2 | OXYGEN SATURATION: 95 % | TEMPERATURE: 97.1 F

## 2020-06-15 DIAGNOSIS — E78.00 HYPERCHOLESTEROLEMIA: ICD-10-CM

## 2020-06-15 DIAGNOSIS — I10 ESSENTIAL HYPERTENSION: Primary | ICD-10-CM

## 2020-06-15 PROCEDURE — 99213 OFFICE O/P EST LOW 20 MIN: CPT | Performed by: FAMILY MEDICINE

## 2020-06-15 NOTE — PROGRESS NOTES
"Subjective   Olu Izquierdo is a 79 y.o. male.     Hyperlipidemia    History of Present Illness    Hypertension follow up. Doing well with current medication which he is taking as directed. No known high or low blood pressure episodes.  Blood pressure running but 130s systolic at home.  He has been off the potassium supplementation for some time.  Since starting the spironolactone his blood pressures been much improved.  Recent potassium 5.0 with normal creatinine normal sodium.  He continues the ACE inhibitor and hydralazine.  Also continues hydrochlorothiazide 12.5 mg a day.  No cardiovascular or neurological symptoms. Today's BP: 139/71.      Hyperlipidemia follow up. He is taking statin medication without complaint. No myopathy symptoms.     Lab Results   Component Value Date    CHLPL 158 05/13/2019    TRIG 111 05/13/2019    HDL 61 (H) 05/13/2019    LDL 75 05/13/2019       The following portions of the patient's history were reviewed and updated as appropriate: allergies, current medications, past family history, past medical history, past social history, past surgical history and problem list.      Review of Systems   Constitutional: Negative.    Respiratory: Negative.    Cardiovascular: Negative.    Genitourinary: Negative for difficulty urinating.   Musculoskeletal: Positive for back pain.       Objective   Blood pressure 139/71, pulse 91, temperature 97.1 °F (36.2 °C), temperature source Temporal, height 182.9 cm (72\"), weight 90.7 kg (200 lb), SpO2 95 %.  Physical Exam   Constitutional: He appears well-developed and well-nourished. No distress.   Neck: No thyromegaly present.   Cardiovascular: Normal rate, regular rhythm, normal heart sounds and intact distal pulses.   Pulmonary/Chest: Effort normal and breath sounds normal.   Musculoskeletal: He exhibits no edema.   Skin: Skin is warm and dry.   Psychiatric: He has a normal mood and affect. His behavior is normal. Judgment and thought content normal. "   Nursing note and vitals reviewed.      Assessment/Plan   Olu was seen today for hyperlipidemia.    Diagnoses and all orders for this visit:    Essential hypertension  -     Basic metabolic panel; Future    Hypercholesterolemia      Hypertension.  Overall well controlled.  I am concerned about the spironolactone and the ACE inhibitor.  The potassium is 5.0, still normal.  He is off potassium supplementation now.  I am rechecking a BMP in 6 weeks.  If his blood pressure remains borderline elevated to consider increasing the hydrochlorothiazide if the potassium gets any higher.  Otherwise may have to decrease his spironolactone.    I will see him back in 6 months for Medicare wellness visit.    Hyperlipidemia.  Stable.

## 2020-07-10 RX ORDER — HYDROCHLOROTHIAZIDE 12.5 MG/1
TABLET ORAL
Qty: 90 TABLET | Refills: 1 | Status: SHIPPED | OUTPATIENT
Start: 2020-07-10 | End: 2020-11-30 | Stop reason: SDUPTHER

## 2020-07-10 RX ORDER — METOPROLOL SUCCINATE 25 MG/1
25 TABLET, EXTENDED RELEASE ORAL DAILY
Qty: 90 TABLET | Refills: 1 | Status: SHIPPED | OUTPATIENT
Start: 2020-07-10 | End: 2020-11-30 | Stop reason: SDUPTHER

## 2020-07-29 ENCOUNTER — LAB (OUTPATIENT)
Dept: LAB | Facility: HOSPITAL | Age: 80
End: 2020-07-29

## 2020-07-29 DIAGNOSIS — I10 ESSENTIAL HYPERTENSION: ICD-10-CM

## 2020-07-29 LAB
ANION GAP SERPL CALCULATED.3IONS-SCNC: 9.2 MMOL/L (ref 5–15)
BUN SERPL-MCNC: 20 MG/DL (ref 8–23)
BUN/CREAT SERPL: 18 (ref 7–25)
CALCIUM SPEC-SCNC: 9.5 MG/DL (ref 8.6–10.5)
CHLORIDE SERPL-SCNC: 102 MMOL/L (ref 98–107)
CO2 SERPL-SCNC: 27.8 MMOL/L (ref 22–29)
CREAT SERPL-MCNC: 1.11 MG/DL (ref 0.76–1.27)
GFR SERPL CREATININE-BSD FRML MDRD: 64 ML/MIN/1.73
GLUCOSE SERPL-MCNC: 107 MG/DL (ref 65–99)
POTASSIUM SERPL-SCNC: 4.3 MMOL/L (ref 3.5–5.2)
SODIUM SERPL-SCNC: 139 MMOL/L (ref 136–145)

## 2020-07-29 PROCEDURE — 36415 COLL VENOUS BLD VENIPUNCTURE: CPT

## 2020-07-29 PROCEDURE — 80048 BASIC METABOLIC PNL TOTAL CA: CPT

## 2020-08-14 RX ORDER — ATORVASTATIN CALCIUM 10 MG/1
10 TABLET, FILM COATED ORAL DAILY
Qty: 90 TABLET | Refills: 1 | Status: SHIPPED | OUTPATIENT
Start: 2020-08-14 | End: 2021-02-25 | Stop reason: SDUPTHER

## 2020-09-03 RX ORDER — RAMIPRIL 10 MG/1
CAPSULE ORAL
Qty: 180 CAPSULE | Refills: 1 | Status: SHIPPED | OUTPATIENT
Start: 2020-09-03 | End: 2021-03-01 | Stop reason: SDUPTHER

## 2020-09-03 RX ORDER — SPIRONOLACTONE 50 MG/1
50 TABLET, FILM COATED ORAL DAILY
Qty: 90 TABLET | Refills: 1 | Status: SHIPPED | OUTPATIENT
Start: 2020-09-03 | End: 2021-03-01 | Stop reason: SDUPTHER

## 2020-09-03 RX ORDER — HYDRALAZINE HYDROCHLORIDE 25 MG/1
TABLET, FILM COATED ORAL
Qty: 180 TABLET | Refills: 1 | Status: SHIPPED | OUTPATIENT
Start: 2020-09-03 | End: 2021-03-01 | Stop reason: SDUPTHER

## 2020-11-30 RX ORDER — HYDROCHLOROTHIAZIDE 12.5 MG/1
12.5 TABLET ORAL DAILY
Qty: 90 TABLET | Refills: 1 | Status: SHIPPED | OUTPATIENT
Start: 2020-11-30 | End: 2021-06-02 | Stop reason: SDUPTHER

## 2020-11-30 RX ORDER — METOPROLOL SUCCINATE 25 MG/1
25 TABLET, EXTENDED RELEASE ORAL DAILY
Qty: 90 TABLET | Refills: 1 | Status: SHIPPED | OUTPATIENT
Start: 2020-11-30 | End: 2021-06-02 | Stop reason: SDUPTHER

## 2020-12-22 DIAGNOSIS — E78.00 HYPERCHOLESTEROLEMIA: Primary | ICD-10-CM

## 2020-12-22 DIAGNOSIS — Z00.00 HEALTHCARE MAINTENANCE: ICD-10-CM

## 2020-12-24 LAB
ALBUMIN SERPL-MCNC: 4.4 G/DL (ref 3.5–5.2)
ALBUMIN/GLOB SERPL: 1.6 G/DL
ALP SERPL-CCNC: 85 U/L (ref 39–117)
ALT SERPL-CCNC: 21 U/L (ref 1–41)
AST SERPL-CCNC: 17 U/L (ref 1–40)
BILIRUB SERPL-MCNC: 0.4 MG/DL (ref 0–1.2)
BUN SERPL-MCNC: 26 MG/DL (ref 8–23)
BUN/CREAT SERPL: 21.3 (ref 7–25)
CALCIUM SERPL-MCNC: 9.4 MG/DL (ref 8.6–10.5)
CHLORIDE SERPL-SCNC: 101 MMOL/L (ref 98–107)
CHOLEST SERPL-MCNC: 160 MG/DL (ref 0–200)
CO2 SERPL-SCNC: 28.7 MMOL/L (ref 22–29)
CREAT SERPL-MCNC: 1.22 MG/DL (ref 0.76–1.27)
GLOBULIN SER CALC-MCNC: 2.7 GM/DL
GLUCOSE SERPL-MCNC: 102 MG/DL (ref 65–99)
HCV AB S/CO SERPL IA: >11 S/CO RATIO (ref 0–0.9)
HDLC SERPL-MCNC: 67 MG/DL (ref 40–60)
LDLC SERPL CALC-MCNC: 76 MG/DL (ref 0–100)
POTASSIUM SERPL-SCNC: 4.8 MMOL/L (ref 3.5–5.2)
PROT SERPL-MCNC: 7.1 G/DL (ref 6–8.5)
SODIUM SERPL-SCNC: 139 MMOL/L (ref 136–145)
TRIGL SERPL-MCNC: 95 MG/DL (ref 0–150)
VLDLC SERPL CALC-MCNC: 17 MG/DL (ref 5–40)

## 2020-12-24 NOTE — PROGRESS NOTES
The lab work overall looks good except for the routine chronic hepatitis C antibody test is positive.  This could be a false positive test.  At the upcoming visit we will need to draw more blood work to further evaluate.  The liver enzymes are normal.  There is no current evidence of any active hepatitis.

## 2020-12-28 ENCOUNTER — OFFICE VISIT (OUTPATIENT)
Dept: FAMILY MEDICINE CLINIC | Facility: CLINIC | Age: 80
End: 2020-12-28

## 2020-12-28 VITALS
WEIGHT: 204.7 LBS | BODY MASS INDEX: 27.73 KG/M2 | DIASTOLIC BLOOD PRESSURE: 78 MMHG | SYSTOLIC BLOOD PRESSURE: 135 MMHG | OXYGEN SATURATION: 99 % | TEMPERATURE: 97.3 F | HEART RATE: 81 BPM | HEIGHT: 72 IN

## 2020-12-28 DIAGNOSIS — R76.8 HEPATITIS C ANTIBODY TEST POSITIVE: ICD-10-CM

## 2020-12-28 DIAGNOSIS — E78.00 HYPERCHOLESTEROLEMIA: ICD-10-CM

## 2020-12-28 DIAGNOSIS — Z00.00 MEDICARE ANNUAL WELLNESS VISIT, SUBSEQUENT: Primary | ICD-10-CM

## 2020-12-28 DIAGNOSIS — I10 ESSENTIAL HYPERTENSION: ICD-10-CM

## 2020-12-28 PROCEDURE — G0439 PPPS, SUBSEQ VISIT: HCPCS | Performed by: FAMILY MEDICINE

## 2020-12-28 PROCEDURE — 99214 OFFICE O/P EST MOD 30 MIN: CPT | Performed by: FAMILY MEDICINE

## 2020-12-28 NOTE — PROGRESS NOTES
The ABCs of the Annual Wellness Visit  Subsequent Medicare Wellness Visit    Chief Complaint   Patient presents with   • Medicare Wellness-subsequent       Subjective   History of Present Illness:  Olu Izquierdo is a 80 y.o. male who presents for a Subsequent Medicare Wellness Visit.    HEALTH RISK ASSESSMENT    Recent Hospitalizations:  No hospitalization(s) within the last year.    Current Medical Providers:  Patient Care Team:  Edenilson Henry MD as PCP - General (Family Medicine)    Smoking Status:  Social History     Tobacco Use   Smoking Status Never Smoker   Smokeless Tobacco Never Used       Alcohol Consumption:  Social History     Substance and Sexual Activity   Alcohol Use Yes    Comment: 1 a night        Depression Screen:   PHQ-2/PHQ-9 Depression Screening 12/28/2020   Little interest or pleasure in doing things 0   Feeling down, depressed, or hopeless 0   Trouble falling or staying asleep, or sleeping too much -   Feeling tired or having little energy -   Poor appetite or overeating -   Feeling bad about yourself - or that you are a failure or have let yourself or your family down -   Trouble concentrating on things, such as reading the newspaper or watching television -   Moving or speaking so slowly that other people could have noticed. Or the opposite - being so fidgety or restless that you have been moving around a lot more than usual -   Thoughts that you would be better off dead, or of hurting yourself in some way -   Total Score 0   If you checked off any problems, how difficult have these problems made it for you to do your work, take care of things at home, or get along with other people? -       Fall Risk Screen:  STEADI Fall Risk Assessment was completed, and patient is at LOW risk for falls.Assessment completed on:12/28/2020    Health Habits and Functional and Cognitive Screening:  Functional & Cognitive Status 12/28/2020   Do you have difficulty preparing food and eating? No   Do you have  difficulty bathing yourself, getting dressed or grooming yourself? No   Do you have difficulty using the toilet? No   Do you have difficulty moving around from place to place? Yes   Do you have trouble with steps or getting out of a bed or a chair? Yes   Current Diet Well Balanced Diet   Dental Exam Up to date   Eye Exam Up to date   Exercise (times per week) 0 times per week        Exercise Frequency Comment -   Current Exercise Activities Include None   Do you need help using the phone?  No   Are you deaf or do you have serious difficulty hearing?  No   Do you need help with transportation? Yes   Do you need help shopping? No   Do you need help preparing meals?  No   Do you need help with housework?  No   Do you need help with laundry? No   Do you need help taking your medications? No   Do you need help managing money? No   Do you ever drive or ride in a car without wearing a seat belt? No   Have you felt unusual stress, anger or loneliness in the last month? Yes   Who do you live with? Spouse   If you need help, do you have trouble finding someone available to you? No   Have you been bothered in the last four weeks by sexual problems? No   Do you have difficulty concentrating, remembering or making decisions? No         Does the patient have evidence of cognitive impairment? No    Asprin use counseling:Does not need ASA (and currently is not on it)    Age-appropriate Screening Schedule:  Refer to the list below for future screening recommendations based on patient's age, sex and/or medical conditions. Orders for these recommended tests are listed in the plan section. The patient has been provided with a written plan.    Health Maintenance   Topic Date Due   • TDAP/TD VACCINES (1 - Tdap) 09/05/1960   • ZOSTER VACCINE (1 of 2) 09/05/1991   • LIPID PANEL  12/23/2021   • INFLUENZA VACCINE  Completed          The following portions of the patient's history were reviewed and updated as appropriate: allergies, current  "medications, past family history, past medical history, past social history, past surgical history and problem list.    Outpatient Medications Prior to Visit   Medication Sig Dispense Refill   • acetaminophen (TYLENOL) 500 MG tablet Take 500 mg by mouth Daily. PT STATES HE TAKES 3 TABLETS IN THE AM     • atorvastatin (LIPITOR) 10 MG tablet TAKE 1 TABLET BY MOUTH DAILY 90 tablet 1   • diphenhydrAMINE-acetaminophen (TYLENOL PM)  MG tablet per tablet Take 2 tablets by mouth At Night As Needed for Sleep.     • hydrALAZINE (APRESOLINE) 25 MG tablet TAKE 1 TABLET BY MOUTH TWICE DAILY 180 tablet 1   • hydroCHLOROthiazide (HYDRODIURIL) 12.5 MG tablet Take 1 tablet by mouth Daily. 90 tablet 1   • metoprolol succinate XL (TOPROL-XL) 25 MG 24 hr tablet Take 1 tablet by mouth Daily. 90 tablet 1   • ramipril (ALTACE) 10 MG capsule TAKE 1 CAPSULE BY MOUTH TWICE DAILY 180 capsule 1   • spironolactone (ALDACTONE) 50 MG tablet TAKE 1 TABLET BY MOUTH DAILY 90 tablet 1     No facility-administered medications prior to visit.        Patient Active Problem List   Diagnosis   • Essential hypertension   • Hypercholesterolemia   • Chronic midline low back pain without sciatica       Advanced Care Planning:  ACP discussion was held with the patient during this visit. Patient has an advance directive in EMR which is still valid.     Review of Systems    Compared to one year ago, the patient feels his physical health is the same.  Compared to one year ago, the patient feels his mental health is the same.    Reviewed chart for potential of high risk medication in the elderly: yes  Reviewed chart for potential of harmful drug interactions in the elderly:yes    Objective         Vitals:    12/28/20 1251   BP: 135/78   Pulse: 81   Temp: 97.3 °F (36.3 °C)   TempSrc: Temporal   SpO2: 99%   Weight: 92.9 kg (204 lb 11.2 oz)   Height: 182.9 cm (72.01\")       Body mass index is 27.76 kg/m².  Discussed the patient's BMI with him. The BMI is in the " acceptable range.    Physical Exam    Lab Results   Component Value Date     (H) 12/23/2020    CHLPL 160 12/23/2020    TRIG 95 12/23/2020    HDL 67 (H) 12/23/2020    LDL 76 12/23/2020    VLDL 17 12/23/2020        Assessment/Plan   Medicare Risks and Personalized Health Plan  CMS Preventative Services Quick Reference  Advance Directive Discussion  Immunizations Discussed/Encouraged (specific immunizations; Influenza and COVID 19 Vaccine Discussed.  )    The above risks/problems have been discussed with the patient.  Pertinent information has been shared with the patient in the After Visit Summary.  Follow up plans and orders are seen below in the Assessment/Plan Section.    Diagnoses and all orders for this visit:    1. Medicare annual wellness visit, subsequent (Primary)    2. Essential hypertension    3. Hypercholesterolemia      Follow Up:  No follow-ups on file.     An After Visit Summary and PPPS were given to the patient.

## 2020-12-28 NOTE — PROGRESS NOTES
"Subjective   Olu Izquierdo is a 80 y.o. male.     Medicare Wellness-subsequent    History of Present Illness    Hypertension follow up. Doing well with current medication which he is taking as directed. No known high or low blood pressure episodes.  Potassium remains normal.  He continues hydralazine, hydrochlorothiazide, metoprolol, Altace 10 mg a day, and spironolactone 50 mg a day.  This combination has worked well for him.  We have off the potassium supplementation.  No cardiovascular or neurological symptoms. Today's BP: 135/78.      Hyperlipidemia follow up. He is taking statin medication without complaint. No myopathy symptoms.     Lab Results   Component Value Date    CHLPL 160 2020    TRIG 95 2020    HDL 67 (H) 2020    LDL 76 2020     Positive hepatitis C screening test.  Positive antibodies.  High titer.  Normal LFTs.  No history of transfusions.  His son had cirrhosis and alcoholism,  a year ago.  No known hepatitis C.  The patient's LFTs have been normal as far back as I can see in the medical record.    The following portions of the patient's history were reviewed and updated as appropriate: allergies, current medications, past family history, past medical history, past social history, past surgical history and problem list.      Review of Systems   Constitutional: Negative.    HENT: Negative.    Respiratory: Negative.    Cardiovascular: Negative.    Gastrointestinal: Negative.  Negative for blood in stool.        No dysphagia.  No severe acid reflux symptoms.   Endocrine: Negative.    Genitourinary: Negative.  Negative for hematuria.   Musculoskeletal: Positive for back pain.   Skin: Negative.    Neurological: Negative.    Psychiatric/Behavioral: Negative.    All other systems reviewed and are negative.      Objective   Blood pressure 135/78, pulse 81, temperature 97.3 °F (36.3 °C), temperature source Temporal, height 182.9 cm (72.01\"), weight 92.9 kg (204 lb 11.2 oz), SpO2 " 99 %.  Physical Exam  Constitutional:       Appearance: Normal appearance.   HENT:      Head: Atraumatic.   Eyes:      Conjunctiva/sclera: Conjunctivae normal.   Neck:      Musculoskeletal: Normal range of motion and neck supple. No muscular tenderness.   Cardiovascular:      Rate and Rhythm: Normal rate and regular rhythm.      Pulses: Normal pulses.      Heart sounds: Normal heart sounds.   Pulmonary:      Effort: Pulmonary effort is normal.      Breath sounds: Normal breath sounds.   Abdominal:      General: Abdomen is flat. There is no distension.      Palpations: Abdomen is soft. There is no mass.      Tenderness: There is no abdominal tenderness.      Hernia: No hernia is present.   Musculoskeletal: Normal range of motion.   Lymphadenopathy:      Cervical: No cervical adenopathy.   Skin:     General: Skin is warm and dry.      Findings: No rash.   Neurological:      General: No focal deficit present.      Mental Status: He is alert and oriented to person, place, and time.   Psychiatric:         Mood and Affect: Mood normal.         Behavior: Behavior normal.         Assessment/Plan   Diagnoses and all orders for this visit:    1. Medicare annual wellness visit, subsequent (Primary)    2. Essential hypertension    3. Hypercholesterolemia    4. Hepatitis C antibody test positive  -     Hepatitis C RNA, Quantitative, PCR (graph)      Hypertension.  Well-controlled.  He continues the multiple medications.  We will continue to monitor his potassium.  He is off potassium supplementation.  We will continue to monitor his blood pressure at home which has been running normal.    Hyperlipidemia.  He continues atorvastatin.    Positive hepatitis C antibody test, high titer.  Suggestive of either false positive, chronic hepatitis C, or previous cleared hepatitis C.  Less likely chronic hepatitis C given normal LFTs.  However I am recommending the hepatitis C RNA PCR confirmatory test.  We will have those results later  this week hopefully.  Follow-up pending those test results otherwise I will see him back in 6 months.

## 2020-12-31 LAB
HCV RNA SERPL NAA+PROBE-ACNC: NORMAL IU/ML
TEST INFORMATION: NORMAL

## 2020-12-31 NOTE — PROGRESS NOTES
I just called patient.  Patient is aware of negative hepatitis C RNA PCR test.  Patient is aware this means either that he had hepatitis C in the distant past and it cleared, or is a false positive antibody test.  In either case, no further work-up or evaluation needed.

## 2021-02-25 RX ORDER — ATORVASTATIN CALCIUM 10 MG/1
10 TABLET, FILM COATED ORAL DAILY
Qty: 90 TABLET | Refills: 1 | Status: SHIPPED | OUTPATIENT
Start: 2021-02-25 | End: 2021-08-26

## 2021-03-01 RX ORDER — SPIRONOLACTONE 50 MG/1
50 TABLET, FILM COATED ORAL DAILY
Qty: 90 TABLET | Refills: 1 | Status: SHIPPED | OUTPATIENT
Start: 2021-03-01 | End: 2021-08-26

## 2021-03-01 RX ORDER — RAMIPRIL 10 MG/1
10 CAPSULE ORAL 2 TIMES DAILY
Qty: 180 CAPSULE | Refills: 1 | Status: SHIPPED | OUTPATIENT
Start: 2021-03-01 | End: 2021-08-26 | Stop reason: SDUPTHER

## 2021-03-01 RX ORDER — HYDRALAZINE HYDROCHLORIDE 25 MG/1
25 TABLET, FILM COATED ORAL 2 TIMES DAILY
Qty: 180 TABLET | Refills: 1 | Status: SHIPPED | OUTPATIENT
Start: 2021-03-01 | End: 2021-08-26

## 2021-03-09 DIAGNOSIS — Z23 IMMUNIZATION DUE: ICD-10-CM

## 2021-06-02 RX ORDER — HYDROCHLOROTHIAZIDE 12.5 MG/1
12.5 TABLET ORAL DAILY
Qty: 90 TABLET | Refills: 1 | Status: SHIPPED | OUTPATIENT
Start: 2021-06-02 | End: 2021-11-24

## 2021-06-02 RX ORDER — METOPROLOL SUCCINATE 25 MG/1
25 TABLET, EXTENDED RELEASE ORAL DAILY
Qty: 90 TABLET | Refills: 1 | Status: SHIPPED | OUTPATIENT
Start: 2021-06-02 | End: 2021-11-24

## 2021-06-18 DIAGNOSIS — E78.00 HYPERCHOLESTEROLEMIA: Primary | ICD-10-CM

## 2021-06-22 LAB
ALBUMIN SERPL-MCNC: 4.7 G/DL (ref 3.5–5.2)
ALBUMIN/GLOB SERPL: 2.4 G/DL
ALP SERPL-CCNC: 88 U/L (ref 39–117)
ALT SERPL-CCNC: 21 U/L (ref 1–41)
AST SERPL-CCNC: 19 U/L (ref 1–40)
BILIRUB SERPL-MCNC: 0.5 MG/DL (ref 0–1.2)
BUN SERPL-MCNC: 21 MG/DL (ref 8–23)
BUN/CREAT SERPL: 17.9 (ref 7–25)
CALCIUM SERPL-MCNC: 9.8 MG/DL (ref 8.6–10.5)
CHLORIDE SERPL-SCNC: 101 MMOL/L (ref 98–107)
CHOLEST SERPL-MCNC: 154 MG/DL (ref 0–200)
CO2 SERPL-SCNC: 25.8 MMOL/L (ref 22–29)
CREAT SERPL-MCNC: 1.17 MG/DL (ref 0.76–1.27)
GLOBULIN SER CALC-MCNC: 2 GM/DL
GLUCOSE SERPL-MCNC: 102 MG/DL (ref 65–99)
HDLC SERPL-MCNC: 61 MG/DL (ref 40–60)
LDLC SERPL CALC-MCNC: 75 MG/DL (ref 0–100)
POTASSIUM SERPL-SCNC: 4.8 MMOL/L (ref 3.5–5.2)
PROT SERPL-MCNC: 6.7 G/DL (ref 6–8.5)
SODIUM SERPL-SCNC: 138 MMOL/L (ref 136–145)
TRIGL SERPL-MCNC: 101 MG/DL (ref 0–150)
VLDLC SERPL CALC-MCNC: 18 MG/DL (ref 5–40)

## 2021-06-30 ENCOUNTER — OFFICE VISIT (OUTPATIENT)
Dept: FAMILY MEDICINE CLINIC | Facility: CLINIC | Age: 81
End: 2021-06-30

## 2021-06-30 VITALS
BODY MASS INDEX: 28.12 KG/M2 | DIASTOLIC BLOOD PRESSURE: 75 MMHG | HEIGHT: 72 IN | HEART RATE: 104 BPM | WEIGHT: 207.6 LBS | TEMPERATURE: 97.3 F | SYSTOLIC BLOOD PRESSURE: 140 MMHG | OXYGEN SATURATION: 95 %

## 2021-06-30 DIAGNOSIS — E78.00 HYPERCHOLESTEROLEMIA: ICD-10-CM

## 2021-06-30 DIAGNOSIS — I10 ESSENTIAL HYPERTENSION: Primary | ICD-10-CM

## 2021-06-30 PROCEDURE — 99213 OFFICE O/P EST LOW 20 MIN: CPT | Performed by: FAMILY MEDICINE

## 2021-06-30 NOTE — PROGRESS NOTES
"Chief Complaint  Hypertension (follow up )    Subjective          Olu Izquierdo presents to [facility]  History of Present Illness     Resistant hypertension follow-up.  Patient's blood pressures been running normal at home in recent months.  He continues hydralazine 25 mg twice a day, hydrochlorothiazide 12.5 mg a day, metoprolol 25 mg daily, ramipril 10 mg twice a day, and spironolactone 50 mg daily.  His recent creatinine is normal.  Recent GFR at 60.  His potassium is 4.8.  Normal sodium.  No cardiovascular symptoms.  He continues to have some trouble with spinal stenosis pain with walking.  He is not been exercising as much.    Hyperlipidemia.  He continues on simvastatin 10 mg a day.  His cholesterol panel is overall favorable with a high HDL and low LDL.    Objective   Vital Signs:   /75   Pulse 104   Temp 97.3 °F (36.3 °C) (Temporal)   Ht 182.9 cm (72.01\")   Wt 94.2 kg (207 lb 9.6 oz)   SpO2 95%   BMI 28.15 kg/m²     Physical Exam  Vitals and nursing note reviewed.   Constitutional:       General: He is not in acute distress.     Appearance: He is well-developed.   Cardiovascular:      Rate and Rhythm: Normal rate and regular rhythm.      Heart sounds: Normal heart sounds.   Pulmonary:      Effort: Pulmonary effort is normal.      Breath sounds: Normal breath sounds.   Skin:     General: Skin is warm and dry.   Psychiatric:         Behavior: Behavior normal.         Thought Content: Thought content normal.         Judgment: Judgment normal.        Result Review :   The following data was reviewed by: Edenilson Henry MD on 06/30/2021:  Common labs    Common Labsle 7/29/20 12/23/20 12/23/20 6/21/21 6/21/21     1000 1000 0955 0955   Glucose 107 (A)       Glucose  102 (A)  102 (A)    BUN 20 26 (A)  21    Creatinine 1.11 1.22  1.17    eGFR Non  Am 64 57 (A)  60 (A)    eGFR African Am  69  73    Sodium 139 139  138    Potassium 4.3 4.8  4.8    Chloride 102 101  101 "    Calcium 9.5 9.4  9.8    Total Protein  7.1  6.7    Albumin  4.40  4.70    Total Bilirubin  0.4  0.5    Alkaline Phosphatase  85  88    AST (SGOT)  17  19    ALT (SGPT)  21  21    Total Cholesterol   160  154   Triglycerides   95  101   HDL Cholesterol   67 (A)  61 (A)   LDL Cholesterol    76  75   (A) Abnormal value       Comments are available for some flowsheets but are not being displayed.                     Assessment and Plan    Diagnoses and all orders for this visit:    1. Essential hypertension (Primary)  -     Comprehensive Metabolic Panel; Future  -     Lipid Panel; Future    2. Hypercholesterolemia  -     Comprehensive Metabolic Panel; Future  -     Lipid Panel; Future      Hypertension.  Overall well controlled with 5 medication.  His potassium is not elevated.  He is off potassium supplementation.  His potassium is also not low.  He is tolerating the combination of hydralazine, spironolactone, ACE inhibitor, hydrochlorothiazide, and beta-blocker.  His previous work-up for secondary hypertension was negative.  His previous aldosterone renin ratio normal.  I will see him back in about 6 months for Medicare wellness visit recheck sooner as needed.    Hyperlipidemia.  Stable.  Continue atorvastatin.      Follow Up   No follow-ups on file.  Patient was given instructions and counseling regarding his condition or for health maintenance advice. Please see specific information pulled into the AVS if appropriate.

## 2021-08-26 RX ORDER — RAMIPRIL 10 MG/1
10 CAPSULE ORAL 2 TIMES DAILY
Qty: 180 CAPSULE | Refills: 1 | Status: SHIPPED | OUTPATIENT
Start: 2021-08-26 | End: 2022-02-22

## 2021-08-26 RX ORDER — ATORVASTATIN CALCIUM 10 MG/1
10 TABLET, FILM COATED ORAL DAILY
Qty: 90 TABLET | Refills: 1 | Status: SHIPPED | OUTPATIENT
Start: 2021-08-26 | End: 2022-02-22

## 2021-08-26 RX ORDER — SPIRONOLACTONE 50 MG/1
50 TABLET, FILM COATED ORAL DAILY
Qty: 90 TABLET | Refills: 1 | Status: SHIPPED | OUTPATIENT
Start: 2021-08-26 | End: 2022-02-22 | Stop reason: SDUPTHER

## 2021-08-26 RX ORDER — HYDRALAZINE HYDROCHLORIDE 25 MG/1
TABLET, FILM COATED ORAL
Qty: 180 TABLET | Refills: 1 | Status: SHIPPED | OUTPATIENT
Start: 2021-08-26 | End: 2022-02-22

## 2021-08-26 NOTE — TELEPHONE ENCOUNTER
Rx Refill Note  Requested Prescriptions     Pending Prescriptions Disp Refills   • atorvastatin (LIPITOR) 10 MG tablet [Pharmacy Med Name: ATORVASTATIN 10MG TABLETS] 90 tablet 1     Sig: TAKE 1 TABLET BY MOUTH DAILY   • hydrALAZINE (APRESOLINE) 25 MG tablet [Pharmacy Med Name: HYDRALAZINE  25MG TABLETS(ORANGE)] 180 tablet 1     Sig: TAKE 1 TABLET BY MOUTH TWICE DAILY   • spironolactone (ALDACTONE) 50 MG tablet [Pharmacy Med Name: SPIRONOLACTONE 50MG TABLETS] 90 tablet 1     Sig: TAKE 1 TABLET BY MOUTH DAILY      Last office visit with prescribing clinician: 6/30/2021      Next office visit with prescribing clinician: 1/12/2022            Cammie Cordoba MA  08/26/21, 08:41 EDT

## 2021-08-26 NOTE — TELEPHONE ENCOUNTER
Rx Refill Note  Requested Prescriptions     Pending Prescriptions Disp Refills   • ramipril (ALTACE) 10 MG capsule 180 capsule 1     Sig: Take 1 capsule by mouth 2 (Two) Times a Day.      Last office visit with prescribing clinician: 6/30/2021      Next office visit with prescribing clinician: 1/12/2022            Cammie Cordoba MA  08/26/21, 14:00 EDT

## 2021-10-05 ENCOUNTER — IMMUNIZATION (OUTPATIENT)
Dept: VACCINE CLINIC | Facility: HOSPITAL | Age: 81
End: 2021-10-05

## 2021-10-05 PROCEDURE — 0001A: CPT | Performed by: INTERNAL MEDICINE

## 2021-10-05 PROCEDURE — 0004A ADM SARSCOV2 30MCG/0.3ML BOOSTER: CPT | Performed by: INTERNAL MEDICINE

## 2021-10-05 PROCEDURE — 91300 HC SARSCOV02 VAC 30MCG/0.3ML IM: CPT | Performed by: INTERNAL MEDICINE

## 2021-11-24 RX ORDER — METOPROLOL SUCCINATE 25 MG/1
25 TABLET, EXTENDED RELEASE ORAL DAILY
Qty: 90 TABLET | Refills: 1 | Status: SHIPPED | OUTPATIENT
Start: 2021-11-24 | End: 2022-05-23 | Stop reason: SDUPTHER

## 2021-11-24 RX ORDER — HYDROCHLOROTHIAZIDE 12.5 MG/1
12.5 TABLET ORAL DAILY
Qty: 90 TABLET | Refills: 1 | Status: SHIPPED | OUTPATIENT
Start: 2021-11-24 | End: 2022-05-23

## 2021-11-24 NOTE — TELEPHONE ENCOUNTER
Rx Refill Note  Requested Prescriptions     Pending Prescriptions Disp Refills   • hydroCHLOROthiazide (HYDRODIURIL) 12.5 MG tablet [Pharmacy Med Name: HYDROCHLOROTHIAZIDE 12.5MG TABLETS] 90 tablet 1     Sig: TAKE 1 TABLET BY MOUTH DAILY   • metoprolol succinate XL (TOPROL-XL) 25 MG 24 hr tablet [Pharmacy Med Name: METOPROLOL ER SUCCINATE 25MG TABS] 90 tablet 1     Sig: TAKE 1 TABLET BY MOUTH DAILY      Last office visit with prescribing clinician: 6/30/2021      Next office visit with prescribing clinician: 1/12/2022            Fatmata Mccollum MA  11/24/21, 07:49 EST

## 2022-01-12 ENCOUNTER — OFFICE VISIT (OUTPATIENT)
Dept: FAMILY MEDICINE CLINIC | Facility: CLINIC | Age: 82
End: 2022-01-12

## 2022-01-12 VITALS
BODY MASS INDEX: 27.37 KG/M2 | OXYGEN SATURATION: 100 % | RESPIRATION RATE: 18 BRPM | HEART RATE: 63 BPM | DIASTOLIC BLOOD PRESSURE: 72 MMHG | HEIGHT: 72 IN | SYSTOLIC BLOOD PRESSURE: 124 MMHG | TEMPERATURE: 96.9 F | WEIGHT: 202.1 LBS

## 2022-01-12 DIAGNOSIS — Z00.00 MEDICARE ANNUAL WELLNESS VISIT, SUBSEQUENT: Primary | ICD-10-CM

## 2022-01-12 DIAGNOSIS — E78.00 HYPERCHOLESTEROLEMIA: ICD-10-CM

## 2022-01-12 DIAGNOSIS — I10 ESSENTIAL HYPERTENSION: ICD-10-CM

## 2022-01-12 PROCEDURE — G0439 PPPS, SUBSEQ VISIT: HCPCS | Performed by: FAMILY MEDICINE

## 2022-01-12 PROCEDURE — 99214 OFFICE O/P EST MOD 30 MIN: CPT | Performed by: FAMILY MEDICINE

## 2022-01-12 PROCEDURE — 1160F RVW MEDS BY RX/DR IN RCRD: CPT | Performed by: FAMILY MEDICINE

## 2022-01-12 PROCEDURE — 1170F FXNL STATUS ASSESSED: CPT | Performed by: FAMILY MEDICINE

## 2022-01-12 NOTE — PROGRESS NOTES
"Chief Complaint  Medicare Wellness-subsequent (yearly medicare wellness), Hyperlipidemia, and Hypertension    Subjective          Olu Izquierdo presents to [facility]  History of Present Illness    Hypertension.  Continues hydralazine hydrochlorothiazide and metoprolol Altace and spironolactone.  Recent creatinine and potassium normal.  His blood pressures running normal here.  He does not check it at home but he has no cardiovascular symptoms including no orthostatic hypotension symptoms.    Hyperlipidemia.  He continues on atorvastatin 10 mg daily.  Lipid panel is overall favorable.  LDL came up slightly.    Objective   Vital Signs:   /72 (BP Location: Left arm, Patient Position: Sitting, Cuff Size: Adult)   Pulse 63   Temp 96.9 °F (36.1 °C) (Temporal)   Resp 18   Ht 182.9 cm (72\")   Wt 91.7 kg (202 lb 1.6 oz)   SpO2 100%   BMI 27.41 kg/m²     Physical Exam  Constitutional:       Appearance: Normal appearance.   HENT:      Head: Atraumatic.   Eyes:      Conjunctiva/sclera: Conjunctivae normal.   Neck:      Thyroid: No thyroid mass, thyromegaly or thyroid tenderness.   Cardiovascular:      Rate and Rhythm: Normal rate and regular rhythm.      Pulses: Normal pulses.      Heart sounds: Normal heart sounds.   Pulmonary:      Effort: Pulmonary effort is normal.      Breath sounds: Normal breath sounds.   Abdominal:      General: Abdomen is flat. There is no distension.      Palpations: Abdomen is soft. There is no mass.      Tenderness: There is no abdominal tenderness.      Hernia: No hernia is present.   Musculoskeletal:         General: Normal range of motion.      Cervical back: Normal range of motion and neck supple. No muscular tenderness.   Lymphadenopathy:      Cervical: No cervical adenopathy.   Skin:     General: Skin is warm and dry.      Findings: No rash.   Neurological:      General: No focal deficit present.      Mental Status: He is alert and oriented to " person, place, and time.   Psychiatric:         Mood and Affect: Mood normal.         Behavior: Behavior normal.        Result Review :   The following data was reviewed by: Edenilson Henry MD on 01/12/2022:  Common labs    Common Labsle 6/21/21 6/21/21 1/5/22 1/5/22    0955 0955 1022 1022   Glucose 102 (A)  101 (A)    BUN 21  28 (A)    Creatinine 1.17  1.17    eGFR Non African Am 60 (A)  59 (A)    eGFR  Am 73  68    Sodium 138  138    Potassium 4.8  4.4    Chloride 101  100    Calcium 9.8  9.7    Total Protein 6.7  6.8    Albumin 4.70  4.6    Total Bilirubin 0.5  0.6    Alkaline Phosphatase 88  83    AST (SGOT) 19  19    ALT (SGPT) 21  20    Total Cholesterol  154  161   Triglycerides  101  113   HDL Cholesterol  61 (A)  58   LDL Cholesterol   75  83   (A) Abnormal value       Comments are available for some flowsheets but are not being displayed.                     Assessment and Plan    Diagnoses and all orders for this visit:    1. Medicare annual wellness visit, subsequent (Primary)    2. Essential hypertension    3. Hypercholesterolemia    Hypertension very well controlled.  Continue all medication.  I have asked him to check his blood pressure at home periodically.  I will see him in 6 months sooner as needed.    Hyperlipidemia.  Well-controlled      Follow Up   No follow-ups on file.  Patient was given instructions and counseling regarding his condition or for health maintenance advice. Please see specific information pulled into the AVS if appropriate.

## 2022-01-12 NOTE — PROGRESS NOTES
The ABCs of the Annual Wellness Visit  Subsequent Medicare Wellness Visit    Chief Complaint   Patient presents with   • Medicare Wellness-subsequent     yearly medicare wellness   • Hyperlipidemia   • Hypertension      Subjective    History of Present Illness:  Olu Izquierdo is a 81 y.o. male who presents for a Subsequent Medicare Wellness Visit.    The following portions of the patient's history were reviewed and   updated as appropriate: allergies, current medications, past family history, past medical history, past social history, past surgical history and problem list.    Compared to one year ago, the patient feels his physical   health is the same.    Compared to one year ago, the patient feels his mental   health is the same.    Recent Hospitalizations:  He was not admitted to the hospital during the last year.       Current Medical Providers:  Patient Care Team:  Edenilson Henry MD as PCP - General (Family Medicine)    Outpatient Medications Prior to Visit   Medication Sig Dispense Refill   • atorvastatin (LIPITOR) 10 MG tablet TAKE 1 TABLET BY MOUTH DAILY 90 tablet 1   • diphenhydrAMINE-acetaminophen (TYLENOL PM)  MG tablet per tablet Take 2 tablets by mouth At Night As Needed for Sleep.     • hydrALAZINE (APRESOLINE) 25 MG tablet TAKE 1 TABLET BY MOUTH TWICE DAILY 180 tablet 1   • hydroCHLOROthiazide (HYDRODIURIL) 12.5 MG tablet TAKE 1 TABLET BY MOUTH DAILY 90 tablet 1   • metoprolol succinate XL (TOPROL-XL) 25 MG 24 hr tablet TAKE 1 TABLET BY MOUTH DAILY 90 tablet 1   • ramipril (ALTACE) 10 MG capsule Take 1 capsule by mouth 2 (Two) Times a Day. 180 capsule 1   • spironolactone (ALDACTONE) 50 MG tablet TAKE 1 TABLET BY MOUTH DAILY 90 tablet 1   • acetaminophen (TYLENOL) 500 MG tablet Take 500 mg by mouth Daily. PT STATES HE TAKES 3 TABLETS IN THE AM       No facility-administered medications prior to visit.       No opioid medication identified on active medication list. I have reviewed chart for  "other potential  high risk medication/s and harmful drug interactions in the elderly.          Aspirin is not on active medication list.  Aspirin use is not indicated based on review of current medical condition/s. Risk of harm outweighs potential benefits.  .    Patient Active Problem List   Diagnosis   • Essential hypertension   • Hypercholesterolemia   • Lower urinary tract infectious disease   • Chronic midline low back pain without sciatica     Advance Care Planning  Advance Directive is on file.  ACP discussion was held with the patient during this visit. Patient has an advance directive in EMR which is still valid.           Objective    Vitals:    01/12/22 1008   BP: 124/72   BP Location: Left arm   Patient Position: Sitting   Cuff Size: Adult   Pulse: 63   Resp: 18   Temp: 96.9 °F (36.1 °C)   TempSrc: Temporal   SpO2: 100%   Weight: 91.7 kg (202 lb 1.6 oz)   Height: 182.9 cm (72\")   PainSc: 3  Comment: rt sided pain hand shoulder     BMI Readings from Last 1 Encounters:   01/12/22 27.41 kg/m²   BMI is above normal parameters. Recommendations include: exercise counseling    Does the patient have evidence of cognitive impairment? No    Physical Exam  Lab Results   Component Value Date    CHLPL 161 01/05/2022    TRIG 113 01/05/2022    HDL 58 01/05/2022    LDL 83 01/05/2022    VLDL 20 01/05/2022            HEALTH RISK ASSESSMENT    Smoking Status:  Social History     Tobacco Use   Smoking Status Never Smoker   Smokeless Tobacco Never Used     Alcohol Consumption:  Social History     Substance and Sexual Activity   Alcohol Use Yes    Comment: 1 a night      Fall Risk Screen:    ADOLFO Fall Risk Assessment was completed, and patient is at LOW risk for falls.Assessment completed on:1/12/2022    Depression Screening:  PHQ-2/PHQ-9 Depression Screening 1/12/2022   Little interest or pleasure in doing things 0   Feeling down, depressed, or hopeless 0   Trouble falling or staying asleep, or sleeping too much 0 "   Feeling tired or having little energy 0   Poor appetite or overeating 0   Feeling bad about yourself - or that you are a failure or have let yourself or your family down 0   Trouble concentrating on things, such as reading the newspaper or watching television 0   Moving or speaking so slowly that other people could have noticed. Or the opposite - being so fidgety or restless that you have been moving around a lot more than usual 0   Thoughts that you would be better off dead, or of hurting yourself in some way 0   Total Score 0   If you checked off any problems, how difficult have these problems made it for you to do your work, take care of things at home, or get along with other people? Not difficult at all       Health Habits and Functional and Cognitive Screening:  Functional & Cognitive Status 1/12/2022   Do you have difficulty preparing food and eating? No   Do you have difficulty bathing yourself, getting dressed or grooming yourself? No   Do you have difficulty using the toilet? No   Do you have difficulty moving around from place to place? No   Do you have trouble with steps or getting out of a bed or a chair? No   Current Diet Well Balanced Diet   Dental Exam Up to date        Dental Exam Comment 01/11/2022   Eye Exam Not up to date   Exercise (times per week) 0 times per week        Exercise Frequency Comment -   Current Exercises Include No Regular Exercise   Current Exercise Activities Include -   Do you need help using the phone?  No   Are you deaf or do you have serious difficulty hearing?  No   Do you need help with transportation? No   Do you need help shopping? No   Do you need help preparing meals?  No   Do you need help with housework?  No   Do you need help with laundry? No   Do you need help taking your medications? No   Do you need help managing money? No   Do you ever drive or ride in a car without wearing a seat belt? No   Have you felt unusual stress, anger or loneliness in the last month?  No   Who do you live with? Spouse   If you need help, do you have trouble finding someone available to you? No   Have you been bothered in the last four weeks by sexual problems? No   Do you have difficulty concentrating, remembering or making decisions? No       Age-appropriate Screening Schedule:  Refer to the list below for future screening recommendations based on patient's age, sex and/or medical conditions. Orders for these recommended tests are listed in the plan section. The patient has been provided with a written plan.    Health Maintenance   Topic Date Due   • TDAP/TD VACCINES (1 - Tdap) Never done   • ZOSTER VACCINE (1 of 2) Never done   • LIPID PANEL  01/05/2023   • INFLUENZA VACCINE  Completed              Assessment/Plan   CMS Preventative Services Quick Reference  Risk Factors Identified During Encounter  Chronic Pain   Caregiver stress  The above risks/problems have been discussed with the patient.  Follow up actions/plans if indicated are seen below in the Assessment/Plan Section.  Pertinent information has been shared with the patient in the After Visit Summary.    Diagnoses and all orders for this visit:    1. Medicare annual wellness visit, subsequent (Primary)    2. Essential hypertension    3. Hypercholesterolemia        Follow Up:   No follow-ups on file.     An After Visit Summary and PPPS were made available to the patient.

## 2022-01-25 ENCOUNTER — OFFICE VISIT (OUTPATIENT)
Dept: FAMILY MEDICINE CLINIC | Facility: CLINIC | Age: 82
End: 2022-01-25

## 2022-01-25 VITALS
TEMPERATURE: 96.9 F | HEIGHT: 72 IN | DIASTOLIC BLOOD PRESSURE: 72 MMHG | SYSTOLIC BLOOD PRESSURE: 130 MMHG | WEIGHT: 200.6 LBS | OXYGEN SATURATION: 95 % | HEART RATE: 97 BPM | BODY MASS INDEX: 27.17 KG/M2

## 2022-01-25 DIAGNOSIS — L21.9 SEBORRHEIC DERMATITIS: Primary | ICD-10-CM

## 2022-01-25 PROCEDURE — 99213 OFFICE O/P EST LOW 20 MIN: CPT | Performed by: FAMILY MEDICINE

## 2022-01-25 RX ORDER — KETOCONAZOLE 20 MG/G
1 CREAM TOPICAL DAILY
Qty: 60 G | Refills: 0 | Status: SHIPPED | OUTPATIENT
Start: 2022-01-25

## 2022-01-25 NOTE — PATIENT INSTRUCTIONS
Seborrheic Dermatitis, Adult  Seborrheic dermatitis is a skin disease that causes red, scaly patches. It usually occurs on the scalp, and it is often called dandruff. The patches may appear on other parts of the body. Skin patches tend to appear where there are many oil glands in the skin. Areas of the body that are commonly affected include the:  · Scalp.  · Ears.  · Eyebrows.  · Face.  · Bearded area of men's faces.  · Skin folds of the body, such as the armpits, groin, and buttocks.  · Chest.  The condition may come and go for no known reason, and it is often long-lasting (chronic).  What are the causes?  The cause of this condition is not known.  What increases the risk?  The following factors may make you more likely to develop this condition:  · Having certain conditions, such as:  ? HIV (human immunodeficiency virus).  ? AIDS (acquired immunodeficiency syndrome).  ? Parkinson's disease.  ? Mood disorders, such as depression.  · Being 40-60 years old.  What are the signs or symptoms?  Symptoms of this condition include:  · Thick scales on the scalp.  · Redness on the face or in the armpits.  · Skin that is flaky. The flakes may be white or yellow.  · Skin that seems oily or dry but is not helped with moisturizers.  · Itching or burning in the affected areas.  How is this diagnosed?  This condition is diagnosed with a medical history and physical exam. A sample of your skin may be tested (skin biopsy). You may need to see a skin specialist (dermatologist).  How is this treated?  There is no cure for this condition, but treatment can help to manage the symptoms. You may get treatment to remove scales, lower the risk of skin infection, and reduce swelling or itching. Treatment may include:  · Creams that reduce skin yeast.  · Medicated shampoo.  · Moisturizing creams or ointments.  · Creams that reduce swelling and irritation (steroids).  Follow these instructions at home:  · Apply over-the-counter and  prescription medicines only as told by your health care provider.  · Use any medicated shampoo, skin creams, or ointments only as told by your health care provider.  · Keep all follow-up visits as told by your health care provider. This is important.  Contact a health care provider if:  · Your symptoms do not improve with treatment.  · Your symptoms get worse.  · You have new symptoms.  Get help right away if:  · Your condition rapidly worsens with treatment.  Summary  · Seborrheic dermatitis is a skin disease that causes red, scaly patches.  · Seborrheic dermatitis commonly affects the scalp, face, and skin folds.  · There is no cure for this condition, but treatment can help to manage the symptoms.  This information is not intended to replace advice given to you by your health care provider. Make sure you discuss any questions you have with your health care provider.  Document Revised: 09/24/2020 Document Reviewed: 09/24/2020  [employer] Patient Education © 2021 [employer]

## 2022-01-25 NOTE — PROGRESS NOTES
"Chief Complaint  Rash (x 2 wks )    Subjective          Olu Izquierdo presents to [facility]  History of Present Illness     2 weeks of a burning scaly red rash on the right side of the face primarily behind the right ear but also into the right temple area in 6 [place].  Also around the bilateral intertriginous folds of the neck.  Also area of eczema on the left wrist and has had long-term eczema on the left ankle.  He does not feel ill.  No fevers or chills no new medications he takes hydralazine.  Nose prolonged sun exposure.  No new shampoos.  Use a mild soap.  He bathes every other day.  He otherwise feels well.  No history of Parkinson's disease.    Objective   Vital Signs:   /72   Pulse 97   Temp 96.9 °F (36.1 °C) (Temporal)   Ht 182.9 cm (72.01\")   Wt 91 kg (200 lb 9.6 oz)   SpO2 95%   BMI 27.20 kg/m²     Physical Exam  Constitutional:       Appearance: Normal appearance.   Skin:     Findings: Rash present.      Comments: Bilateral ears reveal some erythema on the intertriginous folds behind the ear.  However on the right side is very pronounced with some scaly waxy exfoliation.  Similar areas on the right temple.  Mild erythema with some scaling along the neck.  There is eczematous changes on the left wrist.   Neurological:      Mental Status: He is alert.        Result Review :   The following data was reviewed by: Edenilson Henry MD on 01/25/2022:  Common labs    Common Labsle 6/21/21 6/21/21 1/5/22 1/5/22    0955 0955 1022 1022   Glucose 102 (A)  101 (A)    BUN 21  28 (A)    Creatinine 1.17  1.17    eGFR Non African Am 60 (A)  59 (A)    eGFR  Am 73  68    Sodium 138  138    Potassium 4.8  4.4    Chloride 101  100    Calcium 9.8  9.7    Total Protein 6.7  6.8    Albumin 4.70  4.6    Total Bilirubin 0.5  0.6    Alkaline Phosphatase 88  83    AST (SGOT) 19  19    ALT (SGPT) 21  20    Total Cholesterol  154  161   Triglycerides  101  113   HDL Cholesterol  61 " (A)  58   LDL Cholesterol   75  83   (A) Abnormal value       Comments are available for some flowsheets but are not being displayed.                     Assessment and Plan    Diagnoses and all orders for this visit:    1. Seborrheic dermatitis (Primary)    Other orders  -     ketoconazole (NIZORAL) 2 % cream; Apply 1 application topically to the appropriate area as directed Daily.  Dispense: 60 g; Refill: 0      Seborrheic dermatitis.  Somewhat atypical presentation but still the likely diagnosis.  Less likely discoid lupus or other SLE-like reaction from the hydralazine.  No evidence of shingles.  I recommending 2% ketoconazole cream to the affected area once or twice a day for a number of days.  Also he can use head and shoulders or other dandruff shampoo on the face and scalp.  If not improving he is going to let us know.      Follow Up   No follow-ups on file.  Patient was given instructions and counseling regarding his condition or for health maintenance advice. Please see specific information pulled into the AVS if appropriate.

## 2022-02-22 RX ORDER — RAMIPRIL 10 MG/1
CAPSULE ORAL
Qty: 180 CAPSULE | Refills: 1 | Status: SHIPPED | OUTPATIENT
Start: 2022-02-22 | End: 2022-08-22

## 2022-02-22 RX ORDER — ATORVASTATIN CALCIUM 10 MG/1
10 TABLET, FILM COATED ORAL DAILY
Qty: 90 TABLET | Refills: 1 | Status: SHIPPED | OUTPATIENT
Start: 2022-02-22 | End: 2022-08-22

## 2022-02-22 RX ORDER — SPIRONOLACTONE 50 MG/1
50 TABLET, FILM COATED ORAL DAILY
Qty: 90 TABLET | Refills: 1 | Status: SHIPPED | OUTPATIENT
Start: 2022-02-22 | End: 2022-08-22

## 2022-02-22 RX ORDER — HYDRALAZINE HYDROCHLORIDE 25 MG/1
TABLET, FILM COATED ORAL
Qty: 180 TABLET | Refills: 1 | Status: SHIPPED | OUTPATIENT
Start: 2022-02-22 | End: 2022-08-22

## 2022-02-22 NOTE — TELEPHONE ENCOUNTER
Rx Refill Note  Requested Prescriptions     Pending Prescriptions Disp Refills   • atorvastatin (LIPITOR) 10 MG tablet [Pharmacy Med Name: ATORVASTATIN 10MG TABLETS] 90 tablet 1     Sig: TAKE 1 TABLET BY MOUTH DAILY   • hydrALAZINE (APRESOLINE) 25 MG tablet [Pharmacy Med Name: HYDRALAZINE  25MG TABLETS(ORANGE)] 180 tablet 1     Sig: TAKE 1 TABLET BY MOUTH TWICE DAILY   • ramipril (ALTACE) 10 MG capsule [Pharmacy Med Name: RAMIPRIL 10MG CAPSULES] 180 capsule 1     Sig: TAKE 1 CAPSULE BY MOUTH TWICE DAILY      Last office visit with prescribing clinician: 1/25/2022      Next office visit with prescribing clinician: 7/12/2022            Cammie Cordoba MA  02/22/22, 09:23 EST

## 2022-02-22 NOTE — TELEPHONE ENCOUNTER
Rx Refill Note  Requested Prescriptions     Pending Prescriptions Disp Refills   • spironolactone (ALDACTONE) 50 MG tablet 90 tablet 1     Sig: Take 1 tablet by mouth Daily.      Last office visit with prescribing clinician: 1/25/2022      Next office visit with prescribing clinician: 7/12/2022            Cammie Cordoba MA  02/22/22, 13:57 EST

## 2022-05-23 RX ORDER — METOPROLOL SUCCINATE 25 MG/1
25 TABLET, EXTENDED RELEASE ORAL DAILY
Qty: 90 TABLET | Refills: 3 | Status: SHIPPED | OUTPATIENT
Start: 2022-05-23

## 2022-05-23 RX ORDER — HYDROCHLOROTHIAZIDE 12.5 MG/1
12.5 TABLET ORAL DAILY
Qty: 90 TABLET | Refills: 1 | Status: SHIPPED | OUTPATIENT
Start: 2022-05-23 | End: 2022-11-21

## 2022-05-23 NOTE — TELEPHONE ENCOUNTER
Rx Refill Note  Requested Prescriptions     Pending Prescriptions Disp Refills   • metoprolol succinate XL (TOPROL-XL) 25 MG 24 hr tablet 90 tablet 3     Sig: Take 1 tablet by mouth Daily.      Last office visit with prescribing clinician: 1/25/2022      Next office visit with prescribing clinician: 7/13/2022            Polly Vyas  05/23/22, 16:28 EDT

## 2022-05-23 NOTE — TELEPHONE ENCOUNTER
Rx Refill Note  Requested Prescriptions     Pending Prescriptions Disp Refills   • hydroCHLOROthiazide (HYDRODIURIL) 12.5 MG tablet [Pharmacy Med Name: HYDROCHLOROTHIAZIDE 12.5MG TABLETS] 90 tablet 1     Sig: TAKE 1 TABLET BY MOUTH DAILY      Last office visit with prescribing clinician: 1/25/2022      Next office visit with prescribing clinician: 7/13/2022            Polly Vyas  05/23/22, 11:05 EDT

## 2022-06-29 DIAGNOSIS — E78.00 HYPERCHOLESTEROLEMIA: ICD-10-CM

## 2022-06-29 DIAGNOSIS — I10 ESSENTIAL HYPERTENSION: Primary | ICD-10-CM

## 2022-07-13 ENCOUNTER — OFFICE VISIT (OUTPATIENT)
Dept: FAMILY MEDICINE CLINIC | Facility: CLINIC | Age: 82
End: 2022-07-13

## 2022-07-13 VITALS
BODY MASS INDEX: 27.36 KG/M2 | DIASTOLIC BLOOD PRESSURE: 74 MMHG | HEART RATE: 78 BPM | SYSTOLIC BLOOD PRESSURE: 116 MMHG | HEIGHT: 72 IN | WEIGHT: 202 LBS | TEMPERATURE: 97.1 F | OXYGEN SATURATION: 99 %

## 2022-07-13 DIAGNOSIS — E78.00 HYPERCHOLESTEROLEMIA: Chronic | ICD-10-CM

## 2022-07-13 DIAGNOSIS — Z63.6 CAREGIVER STRESS: ICD-10-CM

## 2022-07-13 DIAGNOSIS — I10 ESSENTIAL HYPERTENSION: Primary | Chronic | ICD-10-CM

## 2022-07-13 PROCEDURE — 99214 OFFICE O/P EST MOD 30 MIN: CPT | Performed by: FAMILY MEDICINE

## 2022-07-13 RX ORDER — CLOBETASOL PROPIONATE 0.5 MG/G
CREAM TOPICAL
COMMUNITY
Start: 2022-04-25

## 2022-07-13 SDOH — SOCIAL STABILITY - SOCIAL INSECURITY: DEPENDENT RELATIVE NEEDING CARE AT HOME: Z63.6

## 2022-07-13 NOTE — PROGRESS NOTES
"Chief Complaint  Hypertension and Hyperlipidemia    Subjective        Olu Izquierdo presents to [facility]  History of Present Illness     Follow-up hypertension.  Patient continues hydralazine 25 mg twice a day, Altace 10 mg twice a day not once a day, hydrochlorothiazide 12.5 mg a day, and spironolactone 50 mg a day.  His creatinine is 1.3 and EGFR is fairly stable in high 50s.  He is compliant with his medication.  Potassium is normal.  No cardiovascular symptoms.  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  He does not feel sad and depressed most days.    Hyperlipidemia.  He continues atorvastatin 10 mg a day.  The lipid panel is overall favorable..    Objective   Vital Signs:  /74   Pulse 78   Temp 97.1 °F (36.2 °C) (Temporal)   Ht 182.9 cm (72\")   Wt 91.6 kg (202 lb)   SpO2 99%   BMI 27.40 kg/m²   Estimated body mass index is 27.4 kg/m² as calculated from the following:    Height as of this encounter: 182.9 cm (72\").    Weight as of this encounter: 91.6 kg (202 lb).          Physical Exam  Vitals and nursing note reviewed.   Constitutional:       General: He is not in acute distress.     Appearance: He is well-developed.   Cardiovascular:      Rate and Rhythm: Normal rate and regular rhythm.      Heart sounds: Normal heart sounds.   Pulmonary:      Effort: Pulmonary effort is normal.      Breath sounds: Normal breath sounds.   Musculoskeletal:      Cervical back: Normal range of motion.   Skin:     General: Skin is warm and dry.   Psychiatric:         Mood and Affect: Mood normal.        Result Review :  The following data was reviewed by: Edenilson Henry MD on 07/13/2022:  Common labs    Common Labsle 1/5/22 1/5/22 7/8/22 7/8/22    1022 1022 1007 1007   Glucose 101 (A)  107 (A)    BUN 28 (A)  28 (A)    Creatinine 1.17  1.34 (A)    eGFR Non  Am 59 (A)    "   eGFR African Am 68      Sodium 138  139    Potassium 4.4  4.8    Chloride 100  100    Calcium 9.7  9.7    Total Protein 6.8  7.0    Albumin 4.6  4.6    Total Bilirubin 0.6  0.7    Alkaline Phosphatase 83  94    AST (SGOT) 19  21    ALT (SGPT) 20  21    Total Cholesterol  161  176   Triglycerides  113  100   HDL Cholesterol  58  62   LDL Cholesterol   83  96   (A) Abnormal value       Comments are available for some flowsheets but are not being displayed.                     Assessment and Plan   Diagnoses and all orders for this visit:    1. Essential hypertension (Primary)  -     CBC & Differential; Future  -     Comprehensive Metabolic Panel; Future  -     Lipid Panel; Future    2. Hypercholesterolemia  -     CBC & Differential; Future  -     Comprehensive Metabolic Panel; Future  -     Lipid Panel; Future    3. Caregiver stress      Hypertension.  Overall well controlled.  Continue medication.  We will continue to watch the creatinine and potassium.    Hyperlipidemia.  Continue atorvastatin.  Discussed diet today.    Caregiver stress.  At risk for depression.  Chronic pain also.  May be a good candidate for duloxetine.  We discussed this in some detail.  He understands to get back or if his mood start to get worse.  Otherwise I will see him back in 6 months for Medicare wellness visit.           Follow Up   No follow-ups on file.  Patient was given instructions and counseling regarding his condition or for health maintenance advice. Please see specific information pulled into the AVS if appropriate.

## 2022-08-22 RX ORDER — RAMIPRIL 10 MG/1
CAPSULE ORAL
Qty: 180 CAPSULE | Refills: 1 | Status: SHIPPED | OUTPATIENT
Start: 2022-08-22 | End: 2023-02-20

## 2022-08-22 RX ORDER — HYDRALAZINE HYDROCHLORIDE 25 MG/1
TABLET, FILM COATED ORAL
Qty: 180 TABLET | Refills: 1 | Status: SHIPPED | OUTPATIENT
Start: 2022-08-22 | End: 2023-02-20

## 2022-08-22 RX ORDER — SPIRONOLACTONE 50 MG/1
50 TABLET, FILM COATED ORAL DAILY
Qty: 90 TABLET | Refills: 1 | Status: SHIPPED | OUTPATIENT
Start: 2022-08-22 | End: 2023-02-20

## 2022-08-22 RX ORDER — ATORVASTATIN CALCIUM 10 MG/1
10 TABLET, FILM COATED ORAL DAILY
Qty: 90 TABLET | Refills: 1 | Status: SHIPPED | OUTPATIENT
Start: 2022-08-22 | End: 2023-02-22 | Stop reason: SDUPTHER

## 2022-08-22 NOTE — TELEPHONE ENCOUNTER
Rx Refill Note  Requested Prescriptions     Pending Prescriptions Disp Refills   • atorvastatin (LIPITOR) 10 MG tablet [Pharmacy Med Name: ATORVASTATIN 10MG TABLETS] 90 tablet 1     Sig: TAKE 1 TABLET BY MOUTH DAILY   • ramipril (ALTACE) 10 MG capsule [Pharmacy Med Name: RAMIPRIL 10MG CAPSULES] 180 capsule 1     Sig: TAKE 1 CAPSULE BY MOUTH TWICE DAILY   • spironolactone (ALDACTONE) 50 MG tablet [Pharmacy Med Name: SPIRONOLACTONE 50MG TABLETS] 90 tablet 1     Sig: TAKE 1 TABLET BY MOUTH DAILY   • hydrALAZINE (APRESOLINE) 25 MG tablet [Pharmacy Med Name: HYDRALAZINE  25MG TABLETS(ORANGE)] 180 tablet 1     Sig: TAKE 1 TABLET BY MOUTH TWICE DAILY      Last office visit with prescribing clinician: 7/13/2022      Next office visit with prescribing clinician: 1/20/2023            Polly Vyas  08/22/22, 08:39 EDT

## 2022-11-21 RX ORDER — HYDROCHLOROTHIAZIDE 12.5 MG/1
12.5 TABLET ORAL DAILY
Qty: 90 TABLET | Refills: 0 | Status: SHIPPED | OUTPATIENT
Start: 2022-11-21 | End: 2023-02-21

## 2022-12-28 ENCOUNTER — TELEPHONE (OUTPATIENT)
Dept: FAMILY MEDICINE CLINIC | Facility: CLINIC | Age: 82
End: 2022-12-28

## 2022-12-28 NOTE — TELEPHONE ENCOUNTER
Caller: Hollie Izquierdo    Relationship to patient: Emergency Contact    Best call back number: [phone]    Patient is needing: NEEDS TO RESCHEDULE LABS

## 2023-02-20 RX ORDER — HYDRALAZINE HYDROCHLORIDE 25 MG/1
TABLET, FILM COATED ORAL
Qty: 180 TABLET | Refills: 1 | Status: SHIPPED | OUTPATIENT
Start: 2023-02-20

## 2023-02-20 RX ORDER — SPIRONOLACTONE 50 MG/1
50 TABLET, FILM COATED ORAL DAILY
Qty: 90 TABLET | Refills: 1 | Status: SHIPPED | OUTPATIENT
Start: 2023-02-20

## 2023-02-20 RX ORDER — RAMIPRIL 10 MG/1
CAPSULE ORAL
Qty: 180 CAPSULE | Refills: 1 | Status: SHIPPED | OUTPATIENT
Start: 2023-02-20

## 2023-02-21 RX ORDER — HYDROCHLOROTHIAZIDE 12.5 MG/1
12.5 TABLET ORAL DAILY
Qty: 90 TABLET | Refills: 1 | Status: SHIPPED | OUTPATIENT
Start: 2023-02-21

## 2023-02-21 NOTE — TELEPHONE ENCOUNTER
Rx Refill Note  Requested Prescriptions     Pending Prescriptions Disp Refills   • hydroCHLOROthiazide (HYDRODIURIL) 12.5 MG tablet [Pharmacy Med Name: HYDROCHLOROTHIAZIDE 12.5MG TABLETS] 90 tablet 0     Sig: TAKE 1 TABLET BY MOUTH DAILY      Last office visit with prescribing clinician:  10/15/22  Last telemedicine visit with prescribing clinician: 2/27/2023   Next office visit with prescribing clinician: Visit date not found                         Would you like a call back once the refill request has been completed: [] Yes [] No    If the office needs to give you a call back, can they leave a voicemail: [] Yes [] No    Perlita Diane MA  02/21/23, 16:23 EST

## 2023-02-23 RX ORDER — ATORVASTATIN CALCIUM 10 MG/1
10 TABLET, FILM COATED ORAL DAILY
Qty: 90 TABLET | Refills: 1 | Status: SHIPPED | OUTPATIENT
Start: 2023-02-23

## 2023-02-27 ENCOUNTER — OFFICE VISIT (OUTPATIENT)
Dept: FAMILY MEDICINE CLINIC | Facility: CLINIC | Age: 83
End: 2023-02-27
Payer: MEDICARE

## 2023-02-27 VITALS
HEART RATE: 83 BPM | TEMPERATURE: 96.9 F | WEIGHT: 202.2 LBS | OXYGEN SATURATION: 98 % | HEIGHT: 72 IN | BODY MASS INDEX: 27.39 KG/M2 | DIASTOLIC BLOOD PRESSURE: 65 MMHG | SYSTOLIC BLOOD PRESSURE: 111 MMHG

## 2023-02-27 DIAGNOSIS — Z63.6 CAREGIVER STRESS: ICD-10-CM

## 2023-02-27 DIAGNOSIS — Z00.00 MEDICARE ANNUAL WELLNESS VISIT, SUBSEQUENT: Primary | ICD-10-CM

## 2023-02-27 DIAGNOSIS — E78.00 HYPERCHOLESTEROLEMIA: ICD-10-CM

## 2023-02-27 DIAGNOSIS — I10 ESSENTIAL HYPERTENSION: ICD-10-CM

## 2023-02-27 PROCEDURE — 1160F RVW MEDS BY RX/DR IN RCRD: CPT | Performed by: FAMILY MEDICINE

## 2023-02-27 PROCEDURE — 99214 OFFICE O/P EST MOD 30 MIN: CPT | Performed by: FAMILY MEDICINE

## 2023-02-27 PROCEDURE — G0439 PPPS, SUBSEQ VISIT: HCPCS | Performed by: FAMILY MEDICINE

## 2023-02-27 PROCEDURE — 1170F FXNL STATUS ASSESSED: CPT | Performed by: FAMILY MEDICINE

## 2023-02-27 SDOH — SOCIAL STABILITY - SOCIAL INSECURITY: DEPENDENT RELATIVE NEEDING CARE AT HOME: Z63.6

## 2023-02-27 NOTE — PATIENT INSTRUCTIONS
You are due for adacel Tdap vaccination. (provides protection against tetanus, diptheria and whooping cough) Please  get the immunization at your local pharmacy at your earliest convenience. This immunization will next be due in 10 years. Please click on the link for more information about this vaccine.    [facility] Tdap Vaccine Information    You are due for Shingrix vaccination series ( the newest shingles vaccine).  It is a two shot series spaced 2-6 months apart. Please get this vaccine series started at your earliest convenience at your local pharmacy to help avoid shingles outbreak. It is more effective than the old Zostavax vaccine and is recommended even if you have had the Zostavax vaccine in the past.  Once the Shingrix series is completed, it does not need to be repeated.   For more information, please look at the website below:  [facility] Shingrix Vaccine Information      Medicare Wellness  Personal Prevention Plan of Service     Date of Office Visit:    Encounter Provider:  Edenilson Henry MD  Place of Service:  [facility]  Patient Name: Olu Izquierdo  :  1940    As part of the Medicare Wellness portion of your visit today, we are providing you with this personalized preventive plan of services (PPPS). This plan is based upon recommendations of the [place] Preventive Services Task Force (USPSTF) and the Advisory Committee on Immunization Practices (ACIP).    This lists the preventive care services that should be considered, and provides dates of when you are due. Items listed as completed are up-to-date and do not require any further intervention.    Health Maintenance   Topic Date Due   • TDAP/TD VACCINES (1 - Tdap) Never done   • ZOSTER VACCINE (1 of 2) Never done   • ANNUAL WELLNESS VISIT  2023   • LIPID PANEL  2024   • COVID-19 Vaccine  Completed   • INFLUENZA VACCINE  Completed   • Pneumococcal Vaccine 65+  Completed       No orders of the defined types  were placed in this encounter.      No follow-ups on file.

## 2023-05-23 NOTE — TELEPHONE ENCOUNTER
Rx Refill Note  Requested Prescriptions     Pending Prescriptions Disp Refills   • metoprolol succinate XL (TOPROL-XL) 25 MG 24 hr tablet 90 tablet 1     Sig: Take 1 tablet by mouth Daily.      Last office visit with prescribing clinician: 2/27/2023   Last telemedicine visit with prescribing clinician: Visit date not found   Next office visit with prescribing clinician: 8/28/2023                         Would you like a call back once the refill request has been completed: [] Yes [] No    If the office needs to give you a call back, can they leave a voicemail: [] Yes [] No    Polly Vyas  05/23/23, 13:09 EDT

## 2023-05-24 RX ORDER — METOPROLOL SUCCINATE 25 MG/1
25 TABLET, EXTENDED RELEASE ORAL DAILY
Qty: 90 TABLET | Refills: 1 | Status: SHIPPED | OUTPATIENT
Start: 2023-05-24

## 2023-08-16 RX ORDER — HYDRALAZINE HYDROCHLORIDE 25 MG/1
TABLET, FILM COATED ORAL
Qty: 180 TABLET | Refills: 1 | Status: SHIPPED | OUTPATIENT
Start: 2023-08-16

## 2023-08-16 RX ORDER — SPIRONOLACTONE 50 MG/1
50 TABLET, FILM COATED ORAL DAILY
Qty: 90 TABLET | Refills: 1 | Status: SHIPPED | OUTPATIENT
Start: 2023-08-16

## 2023-08-16 RX ORDER — RAMIPRIL 10 MG/1
CAPSULE ORAL
Qty: 180 CAPSULE | Refills: 1 | Status: SHIPPED | OUTPATIENT
Start: 2023-08-16

## 2023-08-16 RX ORDER — ATORVASTATIN CALCIUM 10 MG/1
10 TABLET, FILM COATED ORAL DAILY
Qty: 90 TABLET | Refills: 1 | Status: SHIPPED | OUTPATIENT
Start: 2023-08-16

## 2023-08-17 RX ORDER — HYDROCHLOROTHIAZIDE 12.5 MG/1
12.5 TABLET ORAL DAILY
Qty: 90 TABLET | Refills: 1 | Status: SHIPPED | OUTPATIENT
Start: 2023-08-17

## 2023-08-17 NOTE — TELEPHONE ENCOUNTER
Rx Refill Note  Requested Prescriptions     Pending Prescriptions Disp Refills    hydroCHLOROthiazide (HYDRODIURIL) 12.5 MG tablet 90 tablet 1     Sig: Take 1 tablet by mouth Daily.      Last office visit with prescribing clinician: 2/27/2023   Last telemedicine visit with prescribing clinician: Visit date not found   Next office visit with prescribing clinician: 8/28/2023                         Would you like a call back once the refill request has been completed: [] Yes [] No    If the office needs to give you a call back, can they leave a voicemail: [] Yes [] No    Polly Vyas  08/17/23, 11:30 EDT

## 2023-08-21 RX ORDER — ATORVASTATIN CALCIUM 10 MG/1
10 TABLET, FILM COATED ORAL DAILY
Qty: 90 TABLET | Refills: 1 | OUTPATIENT
Start: 2023-08-21

## 2023-08-24 DIAGNOSIS — I10 ESSENTIAL HYPERTENSION: ICD-10-CM

## 2023-08-24 DIAGNOSIS — E78.00 HYPERCHOLESTEROLEMIA: ICD-10-CM

## 2023-08-25 LAB
ALBUMIN SERPL-MCNC: 4.8 G/DL (ref 3.5–5.2)
ALBUMIN/GLOB SERPL: 2.5 G/DL
ALP SERPL-CCNC: 82 U/L (ref 39–117)
ALT SERPL-CCNC: 19 U/L (ref 1–41)
AST SERPL-CCNC: 15 U/L (ref 1–40)
BILIRUB SERPL-MCNC: 0.6 MG/DL (ref 0–1.2)
BUN SERPL-MCNC: 19 MG/DL (ref 8–23)
BUN/CREAT SERPL: 17.9 (ref 7–25)
CALCIUM SERPL-MCNC: 9.9 MG/DL (ref 8.6–10.5)
CHLORIDE SERPL-SCNC: 104 MMOL/L (ref 98–107)
CHOLEST SERPL-MCNC: 157 MG/DL (ref 0–200)
CO2 SERPL-SCNC: 26 MMOL/L (ref 22–29)
CREAT SERPL-MCNC: 1.06 MG/DL (ref 0.76–1.27)
EGFRCR SERPLBLD CKD-EPI 2021: 70.5 ML/MIN/1.73
GLOBULIN SER CALC-MCNC: 1.9 GM/DL
GLUCOSE SERPL-MCNC: 99 MG/DL (ref 65–99)
HDLC SERPL-MCNC: 62 MG/DL (ref 40–60)
LDLC SERPL CALC-MCNC: 77 MG/DL (ref 0–100)
POTASSIUM SERPL-SCNC: 4.5 MMOL/L (ref 3.5–5.2)
PROT SERPL-MCNC: 6.7 G/DL (ref 6–8.5)
SODIUM SERPL-SCNC: 141 MMOL/L (ref 136–145)
TRIGL SERPL-MCNC: 99 MG/DL (ref 0–150)
VLDLC SERPL CALC-MCNC: 18 MG/DL (ref 5–40)

## 2023-08-28 ENCOUNTER — OFFICE VISIT (OUTPATIENT)
Dept: FAMILY MEDICINE CLINIC | Facility: CLINIC | Age: 83
End: 2023-08-28
Payer: MEDICARE

## 2023-08-28 VITALS
DIASTOLIC BLOOD PRESSURE: 69 MMHG | TEMPERATURE: 97.5 F | WEIGHT: 201.8 LBS | OXYGEN SATURATION: 96 % | BODY MASS INDEX: 27.33 KG/M2 | SYSTOLIC BLOOD PRESSURE: 118 MMHG | HEART RATE: 80 BPM | HEIGHT: 72 IN

## 2023-08-28 DIAGNOSIS — F33.0 MILD EPISODE OF RECURRENT MAJOR DEPRESSIVE DISORDER: ICD-10-CM

## 2023-08-28 DIAGNOSIS — E78.00 HYPERCHOLESTEROLEMIA: ICD-10-CM

## 2023-08-28 DIAGNOSIS — I10 ESSENTIAL HYPERTENSION: Primary | ICD-10-CM

## 2023-08-28 PROCEDURE — 3078F DIAST BP <80 MM HG: CPT | Performed by: FAMILY MEDICINE

## 2023-08-28 PROCEDURE — 3074F SYST BP LT 130 MM HG: CPT | Performed by: FAMILY MEDICINE

## 2023-08-28 PROCEDURE — 99214 OFFICE O/P EST MOD 30 MIN: CPT | Performed by: FAMILY MEDICINE

## 2023-08-28 NOTE — PROGRESS NOTES
"Chief Complaint  Hypertension    Subjective        Olu Izquierdo presents to [facility]  History of Present Illness    Hypertension follow-up.  Blood pressure is excellent.  Reviewed all of his medication.  Went over the lab work.  He states he has some mild ongoing depression symptoms.  But he does not want medication.  His chronic pain is bearable.  He continues to have caregiver stress.  No suicidal ideation.        Objective   Vital Signs:  /69   Pulse 80   Temp 97.5 øF (36.4 øC) (Temporal)   Ht 182.9 cm (72\")   Wt 91.5 kg (201 lb 12.8 oz)   SpO2 96%   BMI 27.37 kg/mý   Estimated body mass index is 27.37 kg/mý as calculated from the following:    Height as of this encounter: 182.9 cm (72\").    Weight as of this encounter: 91.5 kg (201 lb 12.8 oz).               Physical Exam  Vitals and nursing note reviewed.   Constitutional:       General: He is not in acute distress.     Appearance: He is well-developed.   Cardiovascular:      Rate and Rhythm: Normal rate and regular rhythm.      Heart sounds: Normal heart sounds.   Pulmonary:      Effort: Pulmonary effort is normal.      Breath sounds: Normal breath sounds.   Skin:     General: Skin is warm and dry.   Psychiatric:         Mood and Affect: Mood normal.      Comments: Mental status today is unremarkable.      Result Review :  The following data was reviewed by: Edenilson Henry MD on 08/28/2023:  Common labs          1/11/2023    10:48 8/24/2023    12:10   Common Labs   Glucose 105  99    BUN 28  19    Creatinine 1.19  1.06    Sodium 138  141    Potassium 5.1  4.5    Chloride 100  104    Calcium 9.7  9.9    Total Protein 6.8  6.7    Albumin 4.7  4.8    Total Bilirubin 0.6  0.6    Alkaline Phosphatase 90  82    AST (SGOT) 20  15    ALT (SGPT) 19  19    WBC 8.80     Hemoglobin 14.0     Hematocrit 41.4     Platelets 262     Total Cholesterol 176  157    Triglycerides 89  99    HDL Cholesterol 67  62    LDL Cholesterol  93 "  77                   Assessment and Plan   Diagnoses and all orders for this visit:    1. Essential hypertension (Primary)  -     CBC & Differential; Future  -     Comprehensive Metabolic Panel; Future  -     Lipid Panel; Future    2. Hypercholesterolemia  -     CBC & Differential; Future  -     Comprehensive Metabolic Panel; Future  -     Lipid Panel; Future    3. Mild episode of recurrent major depressive disorder      Hypertension.  Well-controlled.  Continue hydralazine hydrochlorothiazide Altace and spironolactone.  Potassium sodium and creatinine normal.    Hyperlipidemia.  Continue atorvastatin.    Caregiver stress.  Mild depression.  Recurrent.  Patient declines depression medication.  States he was on Zoloft number of years ago when his first wife , he did not like taking it for the 6 months he did.  We did discuss duloxetine again.  Could help both his mild depression symptoms and back pain.  However there is no absolute indication for depression medication at this time.  He was counseled to see us sooner than 6 months if he is having severe depression symptoms.  Or other concerning symptoms.  Otherwise see me for Medicare wellness visit then         Follow Up   No follow-ups on file.  Patient was given instructions and counseling regarding his condition or for health maintenance advice. Please see specific information pulled into the AVS if appropriate.

## 2023-11-15 RX ORDER — METOPROLOL SUCCINATE 25 MG/1
25 TABLET, EXTENDED RELEASE ORAL DAILY
Qty: 90 TABLET | Refills: 1 | Status: SHIPPED | OUTPATIENT
Start: 2023-11-15

## 2023-11-15 NOTE — TELEPHONE ENCOUNTER
Rx Refill Note  Requested Prescriptions     Pending Prescriptions Disp Refills    metoprolol succinate XL (TOPROL-XL) 25 MG 24 hr tablet 90 tablet 1     Sig: Take 1 tablet by mouth Daily.      Last office visit with prescribing clinician: 8/28/2023   Last telemedicine visit with prescribing clinician: Visit date not found   Next office visit with prescribing clinician: 2/27/2024                         Would you like a call back once the refill request has been completed: [] Yes [] No    If the office needs to give you a call back, can they leave a voicemail: [] Yes [] No    Polly Vyas  11/15/23, 11:17 EST

## 2024-02-06 ENCOUNTER — TELEPHONE (OUTPATIENT)
Dept: FAMILY MEDICINE CLINIC | Facility: CLINIC | Age: 84
End: 2024-02-06

## 2024-02-06 NOTE — TELEPHONE ENCOUNTER
"    Hub staff attempted to follow warm transfer process and was unsuccessful     Caller: Olu Izquierdo \"ALFRED\"    Relationship to patient: Self    Best call back number: [phone]     Patient is needing: PLEASE CALL PATIENT TO SCHEDULE LABS  "

## 2024-02-12 RX ORDER — ATORVASTATIN CALCIUM 10 MG/1
10 TABLET, FILM COATED ORAL DAILY
Qty: 90 TABLET | Refills: 1 | Status: SHIPPED | OUTPATIENT
Start: 2024-02-12

## 2024-02-12 RX ORDER — HYDROCHLOROTHIAZIDE 12.5 MG/1
12.5 TABLET ORAL DAILY
Qty: 90 TABLET | Refills: 1 | Status: SHIPPED | OUTPATIENT
Start: 2024-02-12

## 2024-02-12 RX ORDER — HYDRALAZINE HYDROCHLORIDE 25 MG/1
25 TABLET, FILM COATED ORAL 2 TIMES DAILY
Qty: 180 TABLET | Refills: 1 | Status: SHIPPED | OUTPATIENT
Start: 2024-02-12

## 2024-02-27 ENCOUNTER — OFFICE VISIT (OUTPATIENT)
Dept: FAMILY MEDICINE CLINIC | Facility: CLINIC | Age: 84
End: 2024-02-27
Payer: MEDICARE

## 2024-02-27 VITALS
HEIGHT: 72 IN | TEMPERATURE: 96.9 F | HEART RATE: 84 BPM | WEIGHT: 199.5 LBS | OXYGEN SATURATION: 96 % | RESPIRATION RATE: 18 BRPM | BODY MASS INDEX: 27.02 KG/M2 | DIASTOLIC BLOOD PRESSURE: 75 MMHG | SYSTOLIC BLOOD PRESSURE: 140 MMHG

## 2024-02-27 DIAGNOSIS — I10 ESSENTIAL HYPERTENSION: Primary | ICD-10-CM

## 2024-02-27 DIAGNOSIS — E78.00 HYPERCHOLESTEROLEMIA: ICD-10-CM

## 2024-02-27 PROCEDURE — 3078F DIAST BP <80 MM HG: CPT | Performed by: FAMILY MEDICINE

## 2024-02-27 PROCEDURE — 99213 OFFICE O/P EST LOW 20 MIN: CPT | Performed by: FAMILY MEDICINE

## 2024-02-27 PROCEDURE — 3077F SYST BP >= 140 MM HG: CPT | Performed by: FAMILY MEDICINE

## 2024-02-27 NOTE — PROGRESS NOTES
"Chief Complaint  Hypertension    Subjective        Olu Izquierdo presents to [facility]  History of Present Illness    Hypertension follow-up.  Patient continues on hydralazine 25 mg twice a day, hydrochlorothiazide 12.5 mg daily and metoprolol 25 mg daily along with spironolactone 50 mg daily and Altace 10 mg daily.  His potassium and creatinine is normal.  He states he missed a hydralazine dose last week and his blood pressure shot up.  Otherwise it has been normal and he feels fine with no cardiovascular symptoms.    Objective   Vital Signs:  /75 (BP Location: Right arm, Patient Position: Sitting, Cuff Size: Adult)   Pulse 84   Temp 96.9 °F (36.1 °C) (Temporal)   Resp 18   Ht 182.9 cm (72.01\")   Wt 90.5 kg (199 lb 8 oz)   SpO2 96%   BMI 27.05 kg/m²   Estimated body mass index is 27.05 kg/m² as calculated from the following:    Height as of this encounter: 182.9 cm (72.01\").    Weight as of this encounter: 90.5 kg (199 lb 8 oz).             Physical Exam  Vitals and nursing note reviewed.   Constitutional:       General: He is not in acute distress.     Appearance: He is well-developed.   Cardiovascular:      Rate and Rhythm: Normal rate and regular rhythm.      Heart sounds: Normal heart sounds.   Pulmonary:      Effort: Pulmonary effort is normal.      Breath sounds: Normal breath sounds.   Skin:     General: Skin is warm and dry.   Psychiatric:         Mood and Affect: Mood normal.        Result Review :    The following data was reviewed by: Edenilson Henry MD on 02/27/2024:  Common labs          8/24/2023    12:10 2/21/2024    09:18   Common Labs   Glucose 99  104    BUN 19  26    Creatinine 1.06  1.26    Sodium 141  136    Potassium 4.5  4.8    Chloride 104  101    Calcium 9.9  9.7    Total Protein 6.7  6.6    Albumin 4.8  4.6    Total Bilirubin 0.6  0.6    Alkaline Phosphatase 82  81    AST (SGOT) 15  18    ALT (SGPT) 19  18    WBC  9.43    Hemoglobin  14.1  "   Hematocrit  41.9    Platelets  247    Total Cholesterol 157  156    Triglycerides 99  82    HDL Cholesterol 62  64    LDL Cholesterol  77  77                   Assessment and Plan     Diagnoses and all orders for this visit:    1. Essential hypertension (Primary)  -     CBC & Differential; Future  -     Comprehensive Metabolic Panel; Future  -     Lipid Panel; Future    2. Hypercholesterolemia  -     CBC & Differential; Future  -     Comprehensive Metabolic Panel; Future  -     Lipid Panel; Future      Hypertension.  Overall very well-controlled.  He will continue to monitor his blood pressure at home.  We have attempted to rearrange his medication in the past, and he had trouble with it.  His kidney function and potassium is normal.  I will see him back in 6 months for annual Medicare wellness visit with lab work prior.         Follow Up     Return in about 6 months (around 8/27/2024) for Subsequent Medicare Wellness Visit, Lab Visit One Week Prior to Next Appointment.  Patient was given instructions and counseling regarding his condition or for health maintenance advice. Please see specific information pulled into the AVS if appropriate.

## 2024-04-10 RX ORDER — SPIRONOLACTONE 50 MG/1
50 TABLET, FILM COATED ORAL DAILY
Qty: 90 TABLET | Refills: 1 | Status: SHIPPED | OUTPATIENT
Start: 2024-04-10

## 2024-04-10 NOTE — TELEPHONE ENCOUNTER
Rx Refill Note  Requested Prescriptions     Pending Prescriptions Disp Refills    spironolactone (ALDACTONE) 50 MG tablet [Pharmacy Med Name: SPIRONOLACTONE 50MG TABLETS] 90 tablet 1     Sig: TAKE 1 TABLET BY MOUTH DAILY      Last office visit with prescribing clinician: 2/27/2024   Last telemedicine visit with prescribing clinician: Visit date not found   Next office visit with prescribing clinician: 8/30/2024                         Would you like a call back once the refill request has been completed: [] Yes [] No    If the office needs to give you a call back, can they leave a voicemail: [] Yes [] No    Polly Vyas  04/10/24, 13:11 EDT

## 2024-05-09 RX ORDER — RAMIPRIL 10 MG/1
CAPSULE ORAL
Qty: 180 CAPSULE | Refills: 1 | Status: SHIPPED | OUTPATIENT
Start: 2024-05-09

## 2024-05-13 RX ORDER — METOPROLOL SUCCINATE 25 MG/1
25 TABLET, EXTENDED RELEASE ORAL DAILY
Qty: 90 TABLET | Refills: 1 | Status: SHIPPED | OUTPATIENT
Start: 2024-05-13

## 2024-05-13 NOTE — TELEPHONE ENCOUNTER
Rx Refill Note  Requested Prescriptions     Pending Prescriptions Disp Refills    metoprolol succinate XL (TOPROL-XL) 25 MG 24 hr tablet 90 tablet 1     Sig: Take 1 tablet by mouth Daily.      Last office visit with prescribing clinician: 2/27/2024   Last telemedicine visit with prescribing clinician: Visit date not found   Next office visit with prescribing clinician: 8/30/2024                         Would you like a call back once the refill request has been completed: [] Yes [] No    If the office needs to give you a call back, can they leave a voicemail: [] Yes [] No    Polly Vyas  05/13/24, 12:44 EDT

## 2024-06-06 RX ORDER — HYDRALAZINE HYDROCHLORIDE 25 MG/1
25 TABLET, FILM COATED ORAL 2 TIMES DAILY
Qty: 180 TABLET | Refills: 1 | Status: SHIPPED | OUTPATIENT
Start: 2024-06-06

## 2024-06-06 NOTE — TELEPHONE ENCOUNTER
Rx Refill Note  Requested Prescriptions     Pending Prescriptions Disp Refills    hydrALAZINE (APRESOLINE) 25 MG tablet [Pharmacy Med Name: HYDRALAZINE  25MG TABLETS(ORANGE)] 180 tablet 1     Sig: TAKE 1 TABLET BY MOUTH TWICE DAILY      Last office visit with prescribing clinician: 2/27/2024   Last telemedicine visit with prescribing clinician: Visit date not found   Next office visit with prescribing clinician: 8/30/2024                         Would you like a call back once the refill request has been completed: [] Yes [] No    If the office needs to give you a call back, can they leave a voicemail: [] Yes [] No    Polly Vyas  06/06/24, 10:43 EDT

## 2024-08-07 RX ORDER — SPIRONOLACTONE 50 MG/1
50 TABLET, FILM COATED ORAL DAILY
Qty: 90 TABLET | Refills: 1 | Status: SHIPPED | OUTPATIENT
Start: 2024-08-07

## 2024-08-07 RX ORDER — HYDROCHLOROTHIAZIDE 12.5 MG/1
12.5 TABLET ORAL DAILY
Qty: 90 TABLET | Refills: 1 | Status: SHIPPED | OUTPATIENT
Start: 2024-08-07

## 2024-08-07 RX ORDER — ATORVASTATIN CALCIUM 10 MG/1
10 TABLET, FILM COATED ORAL DAILY
Qty: 90 TABLET | Refills: 1 | Status: SHIPPED | OUTPATIENT
Start: 2024-08-07

## 2024-08-07 NOTE — TELEPHONE ENCOUNTER
Rx Refill Note  Requested Prescriptions     Pending Prescriptions Disp Refills    spironolactone (ALDACTONE) 50 MG tablet [Pharmacy Med Name: SPIRONOLACTONE 50MG TABLETS] 90 tablet 1     Sig: TAKE 1 TABLET BY MOUTH DAILY      Last office visit with prescribing clinician: 02/27/2024  Last telemedicine visit with prescribing clinician: Visit date not found   Next office visit with prescribing clinician: 09/11/2024                        Would you like a call back once the refill request has been completed: [] Yes [] No    If the office needs to give you a call back, can they leave a voicemail: [] Yes [] No    Nena Farr Rep  08/07/24, 12:13 EDT

## 2024-08-07 NOTE — TELEPHONE ENCOUNTER
Rx Refill Note  Requested Prescriptions     Pending Prescriptions Disp Refills    hydroCHLOROthiazide 12.5 MG tablet [Pharmacy Med Name: HYDROCHLOROTHIAZIDE 12.5MG TABLETS] 90 tablet 1     Sig: TAKE 1 TABLET BY MOUTH DAILY    atorvastatin (LIPITOR) 10 MG tablet [Pharmacy Med Name: ATORVASTATIN 10MG TABLETS] 90 tablet 1     Sig: TAKE 1 TABLET BY MOUTH DAILY      Last office visit with prescribing clinician: 2/27/2024   Last telemedicine visit with prescribing clinician: Visit date not found   Next office visit with prescribing clinician: 9/11/2024                         Would you like a call back once the refill request has been completed: [] Yes [] No    If the office needs to give you a call back, can they leave a voicemail: [] Yes [] No    Polly Vyas  08/07/24, 08:24 EDT

## 2024-09-11 ENCOUNTER — OFFICE VISIT (OUTPATIENT)
Dept: FAMILY MEDICINE CLINIC | Facility: CLINIC | Age: 84
End: 2024-09-11
Payer: MEDICARE

## 2024-09-11 VITALS
OXYGEN SATURATION: 97 % | SYSTOLIC BLOOD PRESSURE: 117 MMHG | WEIGHT: 192.3 LBS | TEMPERATURE: 98 F | HEIGHT: 72 IN | DIASTOLIC BLOOD PRESSURE: 64 MMHG | BODY MASS INDEX: 26.05 KG/M2 | HEART RATE: 87 BPM

## 2024-09-11 DIAGNOSIS — I10 ESSENTIAL HYPERTENSION: ICD-10-CM

## 2024-09-11 DIAGNOSIS — Z00.00 MEDICARE ANNUAL WELLNESS VISIT, SUBSEQUENT: Primary | ICD-10-CM

## 2024-09-11 DIAGNOSIS — E78.00 HYPERCHOLESTEROLEMIA: ICD-10-CM

## 2024-09-11 PROCEDURE — 3078F DIAST BP <80 MM HG: CPT | Performed by: FAMILY MEDICINE

## 2024-09-11 PROCEDURE — G0439 PPPS, SUBSEQ VISIT: HCPCS | Performed by: FAMILY MEDICINE

## 2024-09-11 PROCEDURE — 99214 OFFICE O/P EST MOD 30 MIN: CPT | Performed by: FAMILY MEDICINE

## 2024-09-11 PROCEDURE — 1170F FXNL STATUS ASSESSED: CPT | Performed by: FAMILY MEDICINE

## 2024-09-11 PROCEDURE — 1125F AMNT PAIN NOTED PAIN PRSNT: CPT | Performed by: FAMILY MEDICINE

## 2024-09-11 PROCEDURE — 3074F SYST BP LT 130 MM HG: CPT | Performed by: FAMILY MEDICINE

## 2024-09-11 NOTE — PROGRESS NOTES
Subjective   The ABCs of the Annual Wellness Visit  Medicare Wellness Visit      Olu Izquierdo is a 84 y.o. patient who presents for a Medicare Wellness Visit.    The following portions of the patient's history were reviewed and   updated as appropriate: allergies, current medications, past family history, past medical history, past social history, past surgical history, and problem list.    Compared to one year ago, the patient's physical   health is the same.  Compared to one year ago, the patient's mental   health is the same.    Recent Hospitalizations:  He was not admitted to the hospital during the last year.     Current Medical Providers:  Patient Care Team:  Edenilson Henry MD as PCP - General (Family Medicine)    Outpatient Medications Prior to Visit   Medication Sig Dispense Refill    acetaminophen (TYLENOL) 500 MG tablet Take 1 tablet by mouth Daily. PT STATES HE TAKES 3 TABLETS IN THE AM      atorvastatin (LIPITOR) 10 MG tablet TAKE 1 TABLET BY MOUTH DAILY 90 tablet 1    clobetasol (TEMOVATE) 0.05 % cream APPLY TO THE AFFECTED AREA OF SCALP TWICE DAILY AS NEEDED FOR ITCHING AND REDNESS.      diphenhydrAMINE-acetaminophen (TYLENOL PM)  MG tablet per tablet Take 2 tablets by mouth At Night As Needed for Sleep.      hydrALAZINE (APRESOLINE) 25 MG tablet TAKE 1 TABLET BY MOUTH TWICE DAILY 180 tablet 1    hydroCHLOROthiazide 12.5 MG tablet TAKE 1 TABLET BY MOUTH DAILY 90 tablet 1    metoprolol succinate XL (TOPROL-XL) 25 MG 24 hr tablet Take 1 tablet by mouth Daily. 90 tablet 1    ramipril (ALTACE) 10 MG capsule TAKE 1 CAPSULE BY MOUTH TWICE DAILY 180 capsule 1    spironolactone (ALDACTONE) 50 MG tablet TAKE 1 TABLET BY MOUTH DAILY 90 tablet 1     No facility-administered medications prior to visit.     No opioid medication identified on active medication list. I have reviewed chart for other potential  high risk medication/s and harmful drug interactions in the elderly.      Aspirin is not on active  "medication list.  Aspirin use is not indicated based on review of current medical condition/s. Risk of harm outweighs potential benefits.  .    Patient Active Problem List   Diagnosis    Essential hypertension    Hypercholesterolemia    Lower urinary tract infectious disease    Chronic midline low back pain without sciatica    Mild episode of recurrent major depressive disorder     Advance Care Planning Advance Directive is on file.  ACP discussion was held with the patient during this visit. Patient has an advance directive in EMR which is still valid.             Objective   Vitals:    24 1357   BP: 117/64   Pulse: 87   Temp: 98 °F (36.7 °C)   TempSrc: Temporal   SpO2: 97%   Weight: 87.2 kg (192 lb 4.8 oz)   Height: 182.9 cm (72.01\")       Estimated body mass index is 26.07 kg/m² as calculated from the following:    Height as of this encounter: 182.9 cm (72.01\").    Weight as of this encounter: 87.2 kg (192 lb 4.8 oz).    BMI is >= 25 and <30. (Overweight) The following options were offered after discussion;: exercise counseling/recommendations       Does the patient have evidence of cognitive impairment? No  Lab Results   Component Value Date    CHLPL 156 2024    TRIG 86 2024    HDL 61 (H) 2024    LDL 79 2024    VLDL 16 2024                                                                                                Health  Risk Assessment    Smoking Status:  Social History     Tobacco Use   Smoking Status Never   Smokeless Tobacco Never     Alcohol Consumption:  Social History     Substance and Sexual Activity   Alcohol Use Yes    Comment: 1 a night        Fall Risk Screen  STEADI Fall Risk Assessment was completed, and patient is at LOW risk for falls.Assessment completed on:2024    Depression Screenin/11/2024     1:57 PM   PHQ-2/PHQ-9 Depression Screening   Little Interest or Pleasure in Doing Things 0-->not at all   Feeling Down, Depressed or Hopeless " 0-->not at all   PHQ-9: Brief Depression Severity Measure Score 0     Health Habits and Functional and Cognitive Screenin/11/2024     1:55 PM   Functional & Cognitive Status   Do you have difficulty preparing food and eating? No   Do you have difficulty bathing yourself, getting dressed or grooming yourself? No   Do you have difficulty using the toilet? No   Do you have difficulty moving around from place to place? No   Do you have trouble with steps or getting out of a bed or a chair? No   Current Diet Well Balanced Diet   Dental Exam Up to date   Eye Exam Up to date   Exercise (times per week) 0 times per week   Current Exercises Include No Regular Exercise   Do you need help using the phone?  No   Are you deaf or do you have serious difficulty hearing?  No   Do you need help to go to places out of walking distance? No   Do you need help shopping? No   Do you need help preparing meals?  No   Do you need help with housework?  No   Do you need help with laundry? No   Do you need help taking your medications? No   Do you need help managing money? No   Do you ever drive or ride in a car without wearing a seat belt? No   Have you felt unusual stress, anger or loneliness in the last month? No   Who do you live with? Spouse   If you need help, do you have trouble finding someone available to you? No   Have you been bothered in the last four weeks by sexual problems? No   Do you have difficulty concentrating, remembering or making decisions? No           Age-appropriate Screening Schedule:  Refer to the list below for future screening recommendations based on patient's age, sex and/or medical conditions. Orders for these recommended tests are listed in the plan section. The patient has been provided with a written plan.    Health Maintenance List  Health Maintenance   Topic Date Due    TDAP/TD VACCINES (1 - Tdap) Never done    ANNUAL WELLNESS VISIT  2024    BMI FOLLOWUP  2024    COVID-19 Vaccine (7 -  "2023-24 season) 09/01/2024    INFLUENZA VACCINE  08/01/2024    ZOSTER VACCINE (1 of 2) 01/27/2025 (Originally [birth year]1991)    RSV Vaccine - Adults (1 - 1-dose 60+ series) 01/26/2026 (Originally 9/5/2001)    LIPID PANEL  09/06/2025    Pneumococcal Vaccine 65+  Completed                                                                                                                                                CMS Preventative Services Quick Reference  Risk Factors Identified During Encounter  Immunizations Discussed/Encouraged: Influenza, Prevnar 20 (Pneumococcal 20-valent conjugate), COVID19, and RSV (Respiratory Syncytial Virus)    The above risks/problems have been discussed with the patient.  Pertinent information has been shared with the patient in the After Visit Summary.  An After Visit Summary and PPPS were made available to the patient.    Follow Up:   Next Medicare Wellness visit to be scheduled in 1 year.         Additional E&M Note during same encounter follows:  Patient has additional, significant, and separately identifiable condition(s)/problem(s) that require work above and beyond the Medicare Wellness Visit     Chief Complaint  Medicare Wellness-subsequent    Subjective   HPI                  Objective   Vital Signs:  /64   Pulse 87   Temp 98 °F (36.7 °C) (Temporal)   Ht 182.9 cm (72.01\")   Wt 87.2 kg (192 lb 4.8 oz)   SpO2 97%   BMI 26.07 kg/m²   Physical Exam            Assessment and Plan               Medicare annual wellness visit, subsequent    Essential hypertension    Hypercholesterolemia     No orders of the defined types were placed in this encounter.            Follow Up   No follow-ups on file.  Patient was given instructions and counseling regarding his condition or for health maintenance advice. Please see specific information pulled into the AVS if appropriate.  "

## 2024-09-11 NOTE — PROGRESS NOTES
"Chief Complaint  Medicare Wellness-subsequent and HTN    Subjective        Olu Izquierdo presents to [facility]  History of Present Illness    Follow-up hypertension.  Continue spironolactone, Altace, metoprolol, hydrochlorothiazide, and hydralazine 25 mg twice a day.  We attempted in the past to reduce some of these medications without success.  His blood pressure today is excellent.  His recent creatinine and potassium is normal.    Some stressors.  His wife is reportedly suffering from dementia and other disabilities.  He states he is coping.  Negative depression screen.    Hyperlipidemia.  Continues atorvastatin 10 mg daily.  His recent liver enzymes are normal.  His HDL cholesterol is high which is favorable.    Objective   Vital Signs:  /64   Pulse 87   Temp 98 °F (36.7 °C) (Temporal)   Ht 182.9 cm (72.01\")   Wt 87.2 kg (192 lb 4.8 oz)   SpO2 97%   BMI 26.07 kg/m²   Estimated body mass index is 26.07 kg/m² as calculated from the following:    Height as of this encounter: 182.9 cm (72.01\").    Weight as of this encounter: 87.2 kg (192 lb 4.8 oz).          Physical Exam  Constitutional:       Appearance: Normal appearance.   HENT:      Head: Atraumatic.      Mouth/Throat:      Pharynx: Oropharynx is clear. No oropharyngeal exudate or posterior oropharyngeal erythema.   Eyes:      Conjunctiva/sclera: Conjunctivae normal.   Neck:      Thyroid: No thyroid mass, thyromegaly or thyroid tenderness.   Cardiovascular:      Rate and Rhythm: Normal rate and regular rhythm.      Pulses: Normal pulses.      Heart sounds: Normal heart sounds.   Pulmonary:      Effort: Pulmonary effort is normal.      Breath sounds: Normal breath sounds.   Abdominal:      General: Abdomen is flat. There is no distension.      Palpations: Abdomen is soft. There is no mass.      Tenderness: There is no abdominal tenderness.      Hernia: No hernia is present.   Musculoskeletal:         General: Normal " range of motion.      Cervical back: Normal range of motion and neck supple. No muscular tenderness.   Lymphadenopathy:      Cervical: No cervical adenopathy.   Skin:     General: Skin is warm and dry.      Findings: No rash.   Neurological:      General: No focal deficit present.      Mental Status: He is alert and oriented to person, place, and time.   Psychiatric:         Mood and Affect: Mood normal.        Result Review :  The following data was reviewed by: Edenilson Henry MD on 09/11/2024:  Common labs          2/21/2024    09:18 9/6/2024    10:18   Common Labs   Glucose 104  97    BUN 26  24    Creatinine 1.26  1.13    Sodium 136  136    Potassium 4.8  4.6    Chloride 101  102    Calcium 9.7  9.5    Total Protein 6.6  6.5    Albumin 4.6  4.1    Total Bilirubin 0.6  0.5    Alkaline Phosphatase 81  83    AST (SGOT) 18  18    ALT (SGPT) 18  16    WBC 9.43  9.31    Hemoglobin 14.1  13.4    Hematocrit 41.9  41.2    Platelets 247  268    Total Cholesterol 156  156    Triglycerides 82  86    HDL Cholesterol 64  61    LDL Cholesterol  77  79                Assessment and Plan   Diagnoses and all orders for this visit:    1. Medicare annual wellness visit, subsequent (Primary)    2. Essential hypertension  Assessment & Plan:        3. Hypercholesterolemia  Assessment & Plan:           Annual Medicare wellness visit.    Immunizations discussed.  I recommend RSV and COVID-19 and influenza and Prevnar 20 at retail pharmacy.    We discussed prostate cancer screening.  And current guidelines recommending against prostate cancer screening at his age.  He also declined.    Hypertension.  Continue current medication.  Potassium and creatinine normal.    Hyperlipidemia.  Continue statin.    Situational stressors.  Wife ill with reported dementia.  Counseling given today.  Negative depression screen.  I will see him in 6 months.  Sooner as needed.           Follow Up   No follow-ups on file.  Patient was given instructions  and counseling regarding his condition or for health maintenance advice. Please see specific information pulled into the AVS if appropriate.

## 2024-10-14 ENCOUNTER — OFFICE VISIT (OUTPATIENT)
Dept: FAMILY MEDICINE CLINIC | Facility: CLINIC | Age: 84
End: 2024-10-14
Payer: MEDICARE

## 2024-10-14 VITALS
OXYGEN SATURATION: 97 % | HEIGHT: 72 IN | WEIGHT: 194.6 LBS | HEART RATE: 90 BPM | BODY MASS INDEX: 26.36 KG/M2 | DIASTOLIC BLOOD PRESSURE: 68 MMHG | TEMPERATURE: 97.5 F | SYSTOLIC BLOOD PRESSURE: 128 MMHG

## 2024-10-14 DIAGNOSIS — M54.31 SCIATICA OF RIGHT SIDE: Primary | ICD-10-CM

## 2024-10-14 PROCEDURE — 3078F DIAST BP <80 MM HG: CPT | Performed by: FAMILY MEDICINE

## 2024-10-14 PROCEDURE — 3074F SYST BP LT 130 MM HG: CPT | Performed by: FAMILY MEDICINE

## 2024-10-14 PROCEDURE — 99213 OFFICE O/P EST LOW 20 MIN: CPT | Performed by: FAMILY MEDICINE

## 2024-10-14 PROCEDURE — 1125F AMNT PAIN NOTED PAIN PRSNT: CPT | Performed by: FAMILY MEDICINE

## 2024-10-14 RX ORDER — PREDNISONE 20 MG/1
40 TABLET ORAL DAILY
Qty: 10 TABLET | Refills: 0 | Status: SHIPPED | OUTPATIENT
Start: 2024-10-14 | End: 2024-10-19

## 2024-10-14 NOTE — PROGRESS NOTES
"Chief Complaint  Pain (Sciatic nerve pain )    Subjective        Olu Izquierdo presents to [facility]  History of Present Illness    3 weeks of sharp right buttocks pain shooting down to the right foot.  Occurs  simultaneously.  He cannot sit on it for prolonged periods of time.  At [employer] she could not sit on the pew the whole service.  And driving is very uncomfortable.  But when he walks he overall feels well.  He has history of spinal stenosis surgery in 2019.  And previous herniated disc.  He has no numbness in the right leg.  It feels weak at times when the pain is very bothersome.  No saddle anesthesia.  No urinary changes in the last 3 weeks.  And he otherwise feels well.  He cares for his wife.  She is nearly disabled.  Not on blood thinners.  Has been taking Tylenol with no relief.  Blood pressure is well-controlled.    Objective   Vital Signs:  /68   Pulse 90   Temp 97.5 °F (36.4 °C) (Temporal)   Ht 182.9 cm (72.01\")   Wt 88.3 kg (194 lb 9.6 oz)   SpO2 97%   BMI 26.39 kg/m²   Estimated body mass index is 26.39 kg/m² as calculated from the following:    Height as of this encounter: 182.9 cm (72.01\").    Weight as of this encounter: 88.3 kg (194 lb 9.6 oz).            Physical Exam  Constitutional:       Appearance: He is not ill-appearing.   Cardiovascular:      Rate and Rhythm: Normal rate.   Pulmonary:      Effort: Pulmonary effort is normal.   Musculoskeletal:      Comments: He has pain over the right sciatic notch.  Reproducible.  No pain over the right trochanteric bursa.  Good range of motion of the hip.  Strength 5 out of 5.  DTRs are diminished bilaterally at the patellar and Achilles reflexes.  This may be chronic.        Result Review :  The following data was reviewed by: Edenilson Henry MD on 10/14/2024:  Common labs          2/21/2024    09:18 9/6/2024    10:18   Common Labs   Glucose 104  97    BUN 26  24    Creatinine 1.26  1.13    Sodium 136  136  "   Potassium 4.8  4.6    Chloride 101  102    Calcium 9.7  9.5    Total Protein 6.6  6.5    Albumin 4.6  4.1    Total Bilirubin 0.6  0.5    Alkaline Phosphatase 81  83    AST (SGOT) 18  18    ALT (SGPT) 18  16    WBC 9.43  9.31    Hemoglobin 14.1  13.4    Hematocrit 41.9  41.2    Platelets 247  268    Total Cholesterol 156  156    Triglycerides 82  86    HDL Cholesterol 64  61    LDL Cholesterol  77  79                  Assessment and Plan   Diagnoses and all orders for this visit:    1. Sciatica of right side (Primary)  -     Ambulatory Referral to Physical Therapy for Evaluation & Treatment    Other orders  -     predniSONE (DELTASONE) 20 MG tablet; Take 2 tablets by mouth Daily for 5 days.  Dispense: 10 tablet; Refill: 0      Sciatica on the right side.  At this time no immediate indication for MRI or other imaging.  I am recommending 40 mg of prednisone for 5 days to reduce inflammation.  I am recommending physical therapy.  This should gradually improved.  If not better in 6 weeks patient was counseled to let me know.  Would then need MRI and/or further evaluation as indicated.       Follow Up   No follow-ups on file.  Patient was given instructions and counseling regarding his condition or for health maintenance advice. Please see specific information pulled into the AVS if appropriate.

## 2024-10-21 ENCOUNTER — TELEPHONE (OUTPATIENT)
Dept: FAMILY MEDICINE CLINIC | Facility: CLINIC | Age: 84
End: 2024-10-21

## 2024-10-21 DIAGNOSIS — G89.29 CHRONIC MIDLINE LOW BACK PAIN WITHOUT SCIATICA: Primary | ICD-10-CM

## 2024-10-21 DIAGNOSIS — M54.50 CHRONIC MIDLINE LOW BACK PAIN WITHOUT SCIATICA: Primary | ICD-10-CM

## 2024-10-21 RX ORDER — GABAPENTIN 100 MG/1
100 CAPSULE ORAL 2 TIMES DAILY
Qty: 60 CAPSULE | Refills: 0 | Status: SHIPPED | OUTPATIENT
Start: 2024-10-21

## 2024-10-21 NOTE — TELEPHONE ENCOUNTER
"    Caller: Olu Izquierdo \"ALFRED\"     Relationship: PATIENT    Best call back number: [phone]     What is your medical concern? LOW BACK PAIN      Is your provider already aware of this issue? YES    Have you been treated for this issue? PATIENT IS CALLING TO STATE DR HE SAW DR WHIPPLE LAST WEEK FOR LOW BACK PAIN.  HE STATES THE MEDICATION THAT WAS PRESCRIBED AT THAT APPOINTMENT ONLY WORKED FOR A COUPLE OF DAYS.  HE IS WANTING TO KNOW IF DR WHIPPLE WOULD GO AHEAD AND PRESCRIBE THE GABAPENTIN THAT WAS DISCUSSED.      [facility] [employer] [facility] #[id number] [place], [place] - [address] AT [facility] [address] [facility] - [phone] [facility] [phone]     PLEASE ADVISE.  "

## 2024-10-21 NOTE — TELEPHONE ENCOUNTER
Please call patient. I sent a starter dose of Gabapentin to the pharmacy on record. He will need to see me in the office for further refills.

## 2024-11-01 ENCOUNTER — TREATMENT (OUTPATIENT)
Dept: PHYSICAL THERAPY | Facility: CLINIC | Age: 84
End: 2024-11-01
Payer: MEDICARE

## 2024-11-01 DIAGNOSIS — M54.41 ACUTE RIGHT-SIDED LOW BACK PAIN WITH RIGHT-SIDED SCIATICA: Primary | ICD-10-CM

## 2024-11-01 NOTE — PROGRESS NOTES
Physical Therapy Initial Evaluation and Plan of Care  [address]  [facility] [address]  [phone]    Patient: Olu Izquierdo   : 1940  Diagnosis/ICD-10 Code:  Acute right-sided low back pain with right-sided sciatica [M54.41]  Referring practitioner: Edenilson Henry MD  Date of Initial Visit: 2024  Today's Date: 2024  Patient seen for 1 session         Visit Diagnoses:    ICD-10-CM ICD-9-CM   1. Acute right-sided low back pain with right-sided sciatica  M54.41 724.2     724.3         Subjective Questionnaire: Oswestry: 46%      Subjective Evaluation    History of Present Illness  Mechanism of injury: 5 weeks ago. Insidious  R LB, buttock, top of foot and big toe.  Int L shin burning  B RTC tears. Bulging disc and stenosis  Sneeze (+)   (-) B/B  FT care for wife. Electric scooter. Sleeps in recliner. 4-5 years.        Patient Occupation: retired 20 years. Pain  Relieving factors: change in position (recliner with ice; lay down on L side)  Aggravating factors: ambulation and standing  Progression: no change    Social Support  Lives in: multiple-level home (stay on main floor)    Treatments  Previous treatment: medication  Patient Goals  Patient goals for therapy: decreased pain, independence with ADLs/IADLs and return to sport/leisure activities  Patient goal: gave up golf and bowling           Objective          Static Posture     Lumbar Spine   Decreased lordosis.     Palpation     Right Tenderness of the piriformis.     Neurological Testing     Reflexes   Left   Patellar (L4): absent (0)    Right   Patellar (L4): absent (0)    Strength/Myotome Testing     Left Hip   Planes of Motion   Flexion: 4+  Left hip extension strength: NT.  Abduction: 5  External rotation: 4  Internal rotation: 4    Right Hip   Planes of Motion   Flexion: 4+  Right hip extension strength: NT.  Abduction: 5  External rotation: 4  Internal rotation: 4    Left Knee   Flexion: 5  Extension: 5    Right Knee    Flexion: 5  Extension: 5    Left Ankle/Foot   Dorsiflexion: 5  Left ankle plantar flexion strength: NT.    Right Ankle/Foot   Dorsiflexion: 5  Right ankle plantar flexion strength: NT.    Additional Strength Details  Lower abs 3+/5    Tests     Lumbar     Right   Positive passive SLR and quadrant.     Additional Tests Details  (+) SLR sitting R  BAck pain only with quadrant          Assessment & Plan       Assessment  Impairments: abnormal muscle tone, abnormal or restricted ROM, activity intolerance, impaired physical strength, lacks appropriate home exercise program and pain with function   Functional limitations: carrying objects, lifting, sleeping, walking, uncomfortable because of pain, sitting, standing and stooping   Assessment details: Pt is a good candidate for skilled PT intervention, venkatesh manual therapy for spinal joint mobility, alignment, postural restoration and core strengthening to restore functional AROM and strength to return to previous level of ADL's.     Prognosis: good    Goals  Plan Goals: Short Term Goals: ( 3 weeks)  1.Pt to be independent with HEP  2. Pt to exhibit improved lumbar flexion to 50% to allow for increased ease with ADL's  3. Pt to exhibit (-) sitting SLR and less pain into RLE with ambulation  4. Pt to improve lower ab strength to 4-/5 to allow for improved standing/stairclimbing tolerance     Long Term Goals (8 weeks )  1. Pt to ambulate/stand > 30 minutes to allow for caregiving for wife  2. Pt to exhibit >4/5 lower abdominal strength to allow for more strenuous daily activities  3. Pt to report min to no  RLE pain  4. Pt to score < 30% on Back Index     Plan  Therapy options: will be seen for skilled therapy services  Planned modality interventions: cryotherapy, electrical stimulation/Russian stimulation, thermotherapy (hydrocollator packs) and traction  Planned therapy interventions: abdominal trunk stabilization, balance/weight-bearing training, body mechanics training,  home exercise program, joint mobilization, manual therapy, neuromuscular re-education, postural training, soft tissue mobilization, spinal/joint mobilization, strengthening, stretching and therapeutic activities  Frequency: 2x week  Duration in weeks: 8  Treatment plan discussed with: patient            Timed:         Manual Therapy:    10     mins  39630;     Therapeutic Exercise:    8     mins  01945;     Neuromuscular Tremayne:        mins  09604;    Therapeutic Activity:          mins  17524;     Gait Training:           mins  23279;     Ultrasound:          mins  69990;    Ionto                                   mins   95975  Self Care                            mins   97933      Un-Timed:  Electrical Stimulation:         mins  68257 ( );  Dry Needling          mins self-pay  Traction          mins 24457  Canalith Repos         mins 92502      Timed Treatment:   18   mins   Total Treatment:     48   mins          PT: Radha Kumar PT     License Number: [id number]  Electronically signed by Radha Kumar PT, 11/01/24, 1:12 PM EDT    Certification Period: 11/5/2024 thru 2/2/2025  I certify that the therapy services are furnished while this patient is under my care.  The services outlined above are required by this patient, and will be reviewed every 90 days.         Physician Signature:__________________________________________________    PHYSICIAN: Edenilson Henry MD  NPI: [id number]                                      DATE:      Please sign and return via fax to [email or web address] . Thank you, [facility].

## 2024-11-05 RX ORDER — RAMIPRIL 10 MG/1
CAPSULE ORAL
Qty: 180 CAPSULE | Refills: 1 | Status: SHIPPED | OUTPATIENT
Start: 2024-11-05

## 2024-11-05 NOTE — TELEPHONE ENCOUNTER
Rx Refill Note  Requested Prescriptions     Pending Prescriptions Disp Refills    ramipril (ALTACE) 10 MG capsule [Pharmacy Med Name: RAMIPRIL 10MG CAPSULES] 180 capsule 1     Sig: TAKE 1 CAPSULE BY MOUTH TWICE DAILY      Last office visit with prescribing clinician: 10/14/2024   Last telemedicine visit with prescribing clinician: Visit date not found   Next office visit with prescribing clinician: 3/11/2025                         Would you like a call back once the refill request has been completed: [] Yes [] No    If the office needs to give you a call back, can they leave a voicemail: [] Yes [] No    Polly Vyas  11/05/24, 07:46 EST

## 2024-11-07 ENCOUNTER — TELEPHONE (OUTPATIENT)
Dept: FAMILY MEDICINE CLINIC | Facility: CLINIC | Age: 84
End: 2024-11-07

## 2024-11-07 NOTE — TELEPHONE ENCOUNTER
"  Caller: Olu Izquierdo \"ALFRED\"    Relationship: Self    Best call back number: [phone]     What medication are you requesting: GABAPENTIN 300 MG TWICE A DAY    If a prescription is needed, what is your preferred pharmacy and phone number: [facility] #[id number] [place], [place] - [address] AT [facility] - [phone] [facility] [phone] FX     Additional notes: CURRENTLY ON GABAPENTIN 100 MG TWICE A DAY WANTING TO INCREASE MEDICATION STATES IS NOT WORKING AT THAT DOSAGE PLEASE CALL AND ADVISE          "

## 2024-11-08 ENCOUNTER — TREATMENT (OUTPATIENT)
Dept: PHYSICAL THERAPY | Facility: CLINIC | Age: 84
End: 2024-11-08
Payer: MEDICARE

## 2024-11-08 DIAGNOSIS — M54.50 CHRONIC MIDLINE LOW BACK PAIN WITHOUT SCIATICA: ICD-10-CM

## 2024-11-08 DIAGNOSIS — M54.41 ACUTE RIGHT-SIDED LOW BACK PAIN WITH RIGHT-SIDED SCIATICA: Primary | ICD-10-CM

## 2024-11-08 DIAGNOSIS — G89.29 CHRONIC MIDLINE LOW BACK PAIN WITHOUT SCIATICA: ICD-10-CM

## 2024-11-08 RX ORDER — GABAPENTIN 300 MG/1
300 CAPSULE ORAL 2 TIMES DAILY
Qty: 60 CAPSULE | Refills: 2 | Status: SHIPPED | OUTPATIENT
Start: 2024-11-08

## 2024-11-08 NOTE — PROGRESS NOTES
Physical Therapy Daily Treatment Note  [address]  [facility] [address]  [phone]    Patient: Olu Izquierdo   : 1940  Referring practitioner: Edenilson Henry MD  Date of Initial Visit: Type: THERAPY  Noted: 2024  Today's Date: 2024  Patient seen for 2 sessions       Visit Diagnoses:    ICD-10-CM ICD-9-CM   1. Acute right-sided low back pain with right-sided sciatica  M54.41 724.2     724.3       Olu Izquierdo reports: I felt good for a couple hours after first visit but pain returned. The sidelying back stretch seems to help but painful to do neural glides seated      Objective   See Exercise, Manual, and Modality Logs for complete treatment.       Assessment/Plan  Improved R hip/back pain after therex and manual. Trial of traction today. SLigght soreness in back but RLE pain less upon leaving the clinic. Nustep aggravated RLE    Progress per Plan of Care        Timed:         Manual Therapy:    8     mins  92242;     Therapeutic Exercise:    15     mins  76880;     Neuromuscular Tremayne:        mins  60861;    Therapeutic Activity:          mins  09320;     Gait Training:           mins  96286;     Ultrasound:          mins  77034;    Ionto                                   mins   46830  Self Care                           mins   69428      Un-Timed:  Electrical Stimulation:         mins  20135 ( );  Dry Needling          mins self-pay  Traction     15     mins 77289  Canalith Repos         mins 08021      Timed Treatment:   23   mins   Total Treatment:     38   mins    Radha Kumar, PT  Physical Therapist  [place] License # [id number]

## 2024-11-13 ENCOUNTER — TREATMENT (OUTPATIENT)
Dept: PHYSICAL THERAPY | Facility: CLINIC | Age: 84
End: 2024-11-13
Payer: MEDICARE

## 2024-11-13 DIAGNOSIS — M54.41 ACUTE RIGHT-SIDED LOW BACK PAIN WITH RIGHT-SIDED SCIATICA: Primary | ICD-10-CM

## 2024-11-13 NOTE — PROGRESS NOTES
Physical Therapy Daily Treatment Note  [address]  [facility] [address]  [phone]    Patient: Olu Izquierdo   : 1940  Referring practitioner: Edenilson Henry MD  Date of Initial Visit: Type: THERAPY  Noted: 2024  Today's Date: 11/15/2024  Patient seen for 3 sessions       Visit Diagnoses:    ICD-10-CM ICD-9-CM   1. Acute right-sided low back pain with right-sided sciatica  M54.41 724.2     724.3       Olu Izquierdo reports: felt good Friday and Saturday. PAin returned . HAd pain R distal quad last night 20-30 minutes @ 330 am. Pain driving mostly in R foot.       Objective   See Exercise, Manual, and Modality Logs for complete treatment.       Assessment/Plan  Attempted prone but could not assume position due to R ant hip pain. Did well with sidelying hip flexor stretch and felt better after treatment    Progress per Plan of Care        Timed:         Manual Therapy:    10     mins  66286;     Therapeutic Exercise:    15     mins  17849;     Neuromuscular Tremayne:        mins  02269;    Therapeutic Activity:          mins  30020;     Gait Training:           mins  03859;     Ultrasound:          mins  93894;    Ionto                                   mins   48992  Self Care                            mins   56035      Un-Timed:  Electrical Stimulation:        mins  79996 ( );  Dry Needling          mins self-pay  Traction     15     mins 33948  Canalith Repos         mins 76553      Timed Treatment:    25  mins   Total Treatment:     40   mins    Radha Kumar, PT  Physical Therapist  [place] License # [id number]

## 2024-11-15 ENCOUNTER — TREATMENT (OUTPATIENT)
Dept: PHYSICAL THERAPY | Facility: CLINIC | Age: 84
End: 2024-11-15
Payer: MEDICARE

## 2024-11-15 DIAGNOSIS — M54.41 ACUTE RIGHT-SIDED LOW BACK PAIN WITH RIGHT-SIDED SCIATICA: Primary | ICD-10-CM

## 2024-11-15 PROCEDURE — 97140 MANUAL THERAPY 1/> REGIONS: CPT | Performed by: PHYSICAL THERAPIST

## 2024-11-15 PROCEDURE — 97110 THERAPEUTIC EXERCISES: CPT | Performed by: PHYSICAL THERAPIST

## 2024-11-15 PROCEDURE — 97012 MECHANICAL TRACTION THERAPY: CPT | Performed by: PHYSICAL THERAPIST

## 2024-11-18 NOTE — PROGRESS NOTES
Physical Therapy Daily Treatment Note  [address]  [place] [address]  [phone]    Patient: Olu Izquierdo   : 1940  Referring practitioner: Edenilson Henry MD  Date of Initial Visit: Type: THERAPY  Noted: 2024  Today's Date: 11/15/2024  Patient seen for 4 sessions       Visit Diagnoses:    ICD-10-CM ICD-9-CM   1. Acute right-sided low back pain with right-sided sciatica  M54.41 724.2     724.3       Olu Izquierdo reports: PT helps for a while then pain returns. ANt R hip and R foot.  R lateral hip and buttock.      Objective   See Exercise, Manual, and Modality Logs for complete treatment.       Assessment/Plan  Instructed pt in proper squat/lig=ft techniques to prevent excessive strain on back. Difficult to always perform when helping wife with daily activities such as bathing and dressing. Anterior right  hip/quad pain with turning on treatment table and first rising from sitting    Progress per Plan of Care and Progress strengthening /stabilization /functional activity        Timed:         Manual Therapy:    15     mins  16057;     Therapeutic Exercise:    15     mins  18181;     Neuromuscular Tremayne:        mins  39745;    Therapeutic Activity:     5     mins  97981;     Gait Training:           mins  83631;     Ultrasound:          mins  94267;    Ionto                                   mins   56672  Self Care                            mins   11915      Un-Timed:  Electrical Stimulation:         mins  85327 ( );  Dry Needling          mins self-pay  Traction     15     mins 47651  Canalith Repos         mins 00019      Timed Treatment:   35   mins   Total Treatment:     50   mins    Radha Kumar, PT  Physical Therapist  [place] License # [id number]

## 2024-11-19 ENCOUNTER — TREATMENT (OUTPATIENT)
Dept: PHYSICAL THERAPY | Facility: CLINIC | Age: 84
End: 2024-11-19
Payer: MEDICARE

## 2024-11-19 DIAGNOSIS — M54.41 ACUTE RIGHT-SIDED LOW BACK PAIN WITH RIGHT-SIDED SCIATICA: Primary | ICD-10-CM

## 2024-11-19 NOTE — PROGRESS NOTES
Physical Therapy Daily Treatment Note  [address]  [facility] [address]  [phone]    Patient: Olu Izquierdo   : 1940  Referring practitioner: Edenilson Henry MD  Date of Initial Visit: Type: THERAPY  Noted: 2024  Today's Date: 2024  Patient seen for 5 sessions       Visit Diagnoses:    ICD-10-CM ICD-9-CM   1. Acute right-sided low back pain with right-sided sciatica  M54.41 724.2     724.3       Olu Izquierdo reports: Saturday painful but  and Monday better. If I get up and walk around it helps. PAin in foot worse with driving. Hip better. Each piece of wife's scooter weighs 25#. 4 pieces total that I have to put in and out of car trunk      Objective   See Exercise, Manual, and Modality Logs for complete treatment.       Assessment/Plan  Pt felt really good after manual therapy and stretching. Was still feeling well with no foot pain after traction. Slight ant thigh pain with supie to sit after traction    Progress per Plan of Care        Timed:         Manual Therapy:    12     mins  03770;     Therapeutic Exercise:    15     mins  42288;     Neuromuscular Tremayne:        mins  32273;    Therapeutic Activity:          mins  38496;     Gait Training:           mins  36293;     Ultrasound:          mins  79668;    Ionto                                   mins   40183  Self Care                            mins   31229      Un-Timed:  Electrical Stimulation:         mins  92043 ( );  Dry Needling          mins self-pay  Traction     15     mins 77572  Canalith Repos         mins 25397      Timed Treatment:   27   mins   Total Treatment:     52   mins    Radha Kumar, PT  Physical Therapist  [place] License # [id number]

## 2024-11-22 ENCOUNTER — TREATMENT (OUTPATIENT)
Dept: PHYSICAL THERAPY | Facility: CLINIC | Age: 84
End: 2024-11-22
Payer: MEDICARE

## 2024-11-22 DIAGNOSIS — M54.41 ACUTE RIGHT-SIDED LOW BACK PAIN WITH RIGHT-SIDED SCIATICA: Primary | ICD-10-CM

## 2024-11-22 NOTE — PROGRESS NOTES
Physical Therapy Daily Treatment Note  [address]  [facility] [address]  [phone]    Patient: Olu Izquierdo   : 1940  Referring practitioner: Edenilson Henry MD  Date of Initial Visit: Type: THERAPY  Noted: 2024  Today's Date: 2024  Patient seen for 6 sessions       Visit Diagnoses:    ICD-10-CM ICD-9-CM   1. Acute right-sided low back pain with right-sided sciatica  M54.41 724.2     724.3       Olu Izquierdo reports: Felt good after last session until the next day. Pain in foot is bad today but I had to drive my wife to [place]      Objective  ANt drawer (-)  Strength inv/ever and DF 5/5 w/o pain   See Exercise, Manual, and Modality Logs for complete treatment.       Assessment/Plan  Assessed ankle and full PROM w/o pain. Tender at lateral calf below fibular head. Pt was initially lying on L side with heat onR back/hip and had to get up due to R dorsal ankle pain. Relieved with walking for a few minutes. Pt felt good leaving the clinic but noted he sometimes has to ut ice on upper back due to soreness from traction. He pointed to about the T8 area.    Progress per Plan of Care        Timed:         Manual Therapy:    10     mins  03301;     Therapeutic Exercise:    15     mins  51806;     Neuromuscular Tremayne:        mins  25377;    Therapeutic Activity:          mins  70641;     Gait Training:           mins  88074;     Ultrasound:         mins  65852;    Ionto                                   mins   61973  Self Care                           mins   74851      Un-Timed:  Electrical Stimulation:         mins  30942 ( );  Dry Needling          mins self-pay  Traction     15     mins 91026  Canalith Repos         mins 26584      Timed Treatment:   25   mins   Total Treatment:     40   mins    Radha Kumar, PT  Physical Therapist  [place] License # [id number]

## 2024-11-27 ENCOUNTER — TREATMENT (OUTPATIENT)
Dept: PHYSICAL THERAPY | Facility: CLINIC | Age: 84
End: 2024-11-27
Payer: MEDICARE

## 2024-11-27 DIAGNOSIS — M54.41 ACUTE RIGHT-SIDED LOW BACK PAIN WITH RIGHT-SIDED SCIATICA: Primary | ICD-10-CM

## 2024-11-27 PROCEDURE — 97140 MANUAL THERAPY 1/> REGIONS: CPT | Performed by: PHYSICAL THERAPIST

## 2024-11-27 PROCEDURE — 97110 THERAPEUTIC EXERCISES: CPT | Performed by: PHYSICAL THERAPIST

## 2024-12-02 DIAGNOSIS — M54.50 CHRONIC MIDLINE LOW BACK PAIN WITHOUT SCIATICA: Primary | ICD-10-CM

## 2024-12-02 DIAGNOSIS — G89.29 CHRONIC MIDLINE LOW BACK PAIN WITHOUT SCIATICA: Primary | ICD-10-CM

## 2024-12-02 NOTE — PROGRESS NOTES
Physical Therapy Daily Treatment Note  [address]  [facility] [address]  [phone]    Patient: Olu Izquierdo   : 1940  Referring practitioner: Edenilson Henry MD  Date of Initial Visit: Type: THERAPY  Noted: 2024  Today's Date: 2024  Patient seen for 7 sessions       Visit Diagnoses:    ICD-10-CM ICD-9-CM   1. Acute right-sided low back pain with right-sided sciatica  M54.41 724.2     724.3       Olu Izquierdo reports: My R hip feels better but I still have significant pain R foot venkatesh with driving. The PT stretches/HEP help but pain relief only temporary,        Objective   (+) SLR R  45 degrees but improved after distraction mobs to lumbar spine and R hip.   Intermittent R foot pain. Worse after driving to PT but decreases with treatment    See Exercise, Manual, and Modality Logs for complete treatment.       Assessment/Plan  Temporary relief only with PT. Will reach out to MD for further diagnostic testing. Py working on proper lifting and posture. Difficulty with caring for his wife who is completely dependent on him    Short Term Goals: ( 3 weeks)  1.Pt to be independent with HEP MET  2. Pt to exhibit improved lumbar flexion to 50% to allow for increased ease with ADL's MET knees bent  3. Pt to exhibit (-) sitting SLR and less pain into RLE with ambulation NO  4. Pt to improve lower ab strength to 4-/5 to allow for improved standing/stairclimbing tolerance NO     Progress per Plan of Care        Timed:         Manual Therapy:    15     mins  24079;     Therapeutic Exercise:    20     mins  43292;     Neuromuscular Tremayne:        mins  58453;    Therapeutic Activity:          mins  23986;     Gait Training:          mins  93336;     Ultrasound:         mins  42274;    Ionto                                   mins   09475  Self Care                            mins   02210      Un-Timed:  Electrical Stimulation:         mins  45689 ( );  Dry Needling          mins  self-pay  Traction          mins 78996  [facility]         mins 03681      Timed Treatment:   35   mins   Total Treatment:     35   mins    Radha Kumar, PT  Physical Therapist  [place] License # [id number]

## 2024-12-03 ENCOUNTER — HOSPITAL ENCOUNTER (OUTPATIENT)
Dept: GENERAL RADIOLOGY | Facility: HOSPITAL | Age: 84
Discharge: HOME | End: 2024-12-03
Admitting: FAMILY MEDICINE
Payer: MEDICARE

## 2024-12-03 ENCOUNTER — TELEPHONE (OUTPATIENT)
Dept: FAMILY MEDICINE CLINIC | Facility: CLINIC | Age: 84
End: 2024-12-03

## 2024-12-03 DIAGNOSIS — G89.29 CHRONIC MIDLINE LOW BACK PAIN WITHOUT SCIATICA: ICD-10-CM

## 2024-12-03 DIAGNOSIS — M54.50 CHRONIC MIDLINE LOW BACK PAIN WITHOUT SCIATICA: ICD-10-CM

## 2024-12-03 PROCEDURE — 72110 X-RAY EXAM L-2 SPINE 4/>VWS: CPT

## 2024-12-03 NOTE — TELEPHONE ENCOUNTER
Made appt for two weeks out but pt wanted to see if he could be worked in sooner - he said the  PT office has suspended care as his condition has not improved they are recommending an MRI pleased adv

## 2024-12-03 NOTE — TELEPHONE ENCOUNTER
"  Caller: Olu Izquierdo \"ALFRED\"     Relationship:  PATIENT    Best call back number: [phone]     What is your medical concern?     PATIENT IS WANTING TO DISCUSS HIS MEDICAL CONDITION WITH YOU AS IT RELATED TO HIS PHYSICAL THERAPY.  IT HAS BEEN SUSPENDED UNTIL THEY HEAR FROM DR WHIPPLE.      PLEASE ADVISE  "

## 2024-12-04 NOTE — TELEPHONE ENCOUNTER
The physical therapist contacted me.  I ordered a plain x-ray of the lumbar spine.  Will need to do that first before an MRI.

## 2024-12-05 ENCOUNTER — TREATMENT (OUTPATIENT)
Dept: PHYSICAL THERAPY | Facility: CLINIC | Age: 84
End: 2024-12-05
Payer: MEDICARE

## 2024-12-05 DIAGNOSIS — M54.50 CHRONIC MIDLINE LOW BACK PAIN WITHOUT SCIATICA: Primary | ICD-10-CM

## 2024-12-05 DIAGNOSIS — M54.41 ACUTE RIGHT-SIDED LOW BACK PAIN WITH RIGHT-SIDED SCIATICA: Primary | ICD-10-CM

## 2024-12-05 DIAGNOSIS — M48.061 SPINAL STENOSIS OF LUMBAR REGION, UNSPECIFIED WHETHER NEUROGENIC CLAUDICATION PRESENT: ICD-10-CM

## 2024-12-05 DIAGNOSIS — G89.29 CHRONIC MIDLINE LOW BACK PAIN WITHOUT SCIATICA: Primary | ICD-10-CM

## 2024-12-05 PROCEDURE — 97140 MANUAL THERAPY 1/> REGIONS: CPT | Performed by: PHYSICAL THERAPIST

## 2024-12-05 PROCEDURE — 97110 THERAPEUTIC EXERCISES: CPT | Performed by: PHYSICAL THERAPIST

## 2024-12-05 NOTE — PROGRESS NOTES
Physical Therapy Daily Treatment Note  [address]  [facility] [address]  [phone]    Patient: Olu Izquierdo   : 1940  Referring practitioner: Edenilson Henry MD  Date of Initial Visit: Type: THERAPY  Noted: 2024  Today's Date: 12/10/2024  Patient seen for 8 sessions       Visit Diagnoses:    ICD-10-CM ICD-9-CM   1. Acute right-sided low back pain with right-sided sciatica  M54.41 724.2     724.3       Olu Izquierdo reports: Feeling a little better last 2 days, even after driving my wife to [employer] which usually makes my foot hurt. Hip pain much better. MD ordered MRI based on my XRay. Thinks I will probably need surgery. But worried due to finding care for shankar baig while recovering      Objective   (-) sitting SLR R  (-) supine SLR R  See Exercise, Manual, and Modality Logs for complete treatment.       Assessment/Plan  Pt able to lay prone today without hip/back pain that he had the first time we attempted prone.    Progress per Plan of Care        Timed:         Manual Therapy:    14     mins  16161;     Therapeutic Exercise:    18     mins  89108;     Neuromuscular Tremayne:        mins  01441;    Therapeutic Activity:          mins  03776;     Gait Training:           mins  70799;     Ultrasound:          mins  10862;    Ionto                                   mins   69017  Self Care                            mins   70274      Un-Timed:  Electrical Stimulation:         mins  91356 ( );  Dry Needling          mins self-pay  Traction          mins 12152  Canalith Repos         mins 23913      Timed Treatment:   32   mins   Total Treatment:     32   mins    Radha Kumar, PT  Physical Therapist  [place] License # [id number]

## 2024-12-10 ENCOUNTER — TREATMENT (OUTPATIENT)
Dept: PHYSICAL THERAPY | Facility: CLINIC | Age: 84
End: 2024-12-10
Payer: MEDICARE

## 2024-12-10 DIAGNOSIS — M54.41 ACUTE RIGHT-SIDED LOW BACK PAIN WITH RIGHT-SIDED SCIATICA: Primary | ICD-10-CM

## 2024-12-10 PROCEDURE — 97140 MANUAL THERAPY 1/> REGIONS: CPT | Performed by: PHYSICAL THERAPIST

## 2024-12-10 PROCEDURE — 97110 THERAPEUTIC EXERCISES: CPT | Performed by: PHYSICAL THERAPIST

## 2024-12-10 NOTE — PROGRESS NOTES
Physical Therapy Daily Treatment Note  [address]  [facility] [address]  [phone]    Patient: Olu Izquierdo   : 1940  Referring practitioner: Edenilson Henry MD  Date of Initial Visit: Type: THERAPY  Noted: 2024  Today's Date: 12/10/2024  Patient seen for 9 sessions       Visit Diagnoses:    ICD-10-CM ICD-9-CM   1. Acute right-sided low back pain with right-sided sciatica  M54.41 724.2     724.3       Olu Izquierdo reports: Foot hurting today. PAin comes and goes. Twist stretch and bridge helps. MRI 2025      Objective   See Exercise, Manual, and Modality Logs for complete treatment.       Assessment/Plan  Relief of R foot pain with LAD and rotational gapping. No foot pain after treatment.    Progress per Plan of Care        Timed:         Manual Therapy:    15     mins  65934;     Therapeutic Exercise:    10     mins  56174;     Neuromuscular Tremayne:        mins  16591;    Therapeutic Activity:          mins  63504;     Gait Training:           mins  52715;     Ultrasound:          mins  24707;    Ionto                                  mins   13466  Self Care                            mins   85462      Un-Timed:  Electrical Stimulation:         mins  10062 ( );  Dry Needling          mins self-pay  Traction          mins 95659  Canalith Repos         mins 97197      Timed Treatment:   25   mins   Total Treatment:     25   mins    Radha Kumar PT  Physical Therapist  [place] License # [id number]

## 2024-12-17 ENCOUNTER — TREATMENT (OUTPATIENT)
Dept: PHYSICAL THERAPY | Facility: CLINIC | Age: 84
End: 2024-12-17
Payer: MEDICARE

## 2024-12-17 DIAGNOSIS — M54.41 ACUTE RIGHT-SIDED LOW BACK PAIN WITH RIGHT-SIDED SCIATICA: Primary | ICD-10-CM

## 2024-12-17 NOTE — PROGRESS NOTES
Physical Therapy Daily Treatment Note  [address]  [facility] [address]  [phone]    Patient: Olu Izquierdo   : 1940  Referring practitioner: Edenilson Henry MD  Date of Initial Visit: Type: THERAPY  Noted: 2024  Today's Date: 2024  Patient seen for 10 sessions       Visit Diagnoses:    ICD-10-CM ICD-9-CM   1. Acute right-sided low back pain with right-sided sciatica  M54.41 724.2     724.3       Olu Izquierdo reports: ***      Objective   See Exercise, Manual, and Modality Logs for complete treatment.       Assessment/Plan  ***    {AMB PT PLAN (SOAP Note):62632}        Timed:         Manual Therapy:    ***     mins  32275;     Therapeutic Exercise:    ***     mins  30810;     Neuromuscular Tremayne:    ***    mins  31455;    Therapeutic Activity:     ***     mins  77015;     Gait Training:      ***     mins  73022;     Ultrasound:     ***     mins  89446;    Ionto                               ***    mins   39950  Self Care                       ***     mins   44785      Un-Timed:  Electrical Stimulation:    ***     mins  82379 ( );  Dry Needling     ***     mins self-pay  Traction     ***     mins 54911  Canalith Repos    ***     mins 08792      Timed Treatment:   ***   mins   Total Treatment:     ***   mins    Radha Kumar, PT  Physical Therapist  [place] License # [id number]

## 2024-12-18 ENCOUNTER — OFFICE VISIT (OUTPATIENT)
Dept: FAMILY MEDICINE CLINIC | Facility: CLINIC | Age: 84
End: 2024-12-18
Payer: MEDICARE

## 2024-12-18 VITALS
DIASTOLIC BLOOD PRESSURE: 67 MMHG | HEART RATE: 77 BPM | SYSTOLIC BLOOD PRESSURE: 136 MMHG | BODY MASS INDEX: 25.87 KG/M2 | TEMPERATURE: 96.9 F | WEIGHT: 191 LBS | OXYGEN SATURATION: 97 % | HEIGHT: 72 IN

## 2024-12-18 DIAGNOSIS — M54.31 SCIATICA OF RIGHT SIDE: Primary | ICD-10-CM

## 2024-12-18 DIAGNOSIS — M48.061 SPINAL STENOSIS OF LUMBAR REGION, UNSPECIFIED WHETHER NEUROGENIC CLAUDICATION PRESENT: ICD-10-CM

## 2024-12-18 PROCEDURE — 3078F DIAST BP <80 MM HG: CPT | Performed by: FAMILY MEDICINE

## 2024-12-18 PROCEDURE — 99214 OFFICE O/P EST MOD 30 MIN: CPT | Performed by: FAMILY MEDICINE

## 2024-12-18 PROCEDURE — 1125F AMNT PAIN NOTED PAIN PRSNT: CPT | Performed by: FAMILY MEDICINE

## 2024-12-18 PROCEDURE — 3075F SYST BP GE 130 - 139MM HG: CPT | Performed by: FAMILY MEDICINE

## 2024-12-18 RX ORDER — HYDROCODONE BITARTRATE AND ACETAMINOPHEN 5; 325 MG/1; MG/1
1 TABLET ORAL EVERY 6 HOURS PRN
Qty: 12 TABLET | Refills: 0 | Status: SHIPPED | OUTPATIENT
Start: 2024-12-18

## 2024-12-18 RX ORDER — GABAPENTIN 600 MG/1
600 TABLET ORAL 3 TIMES DAILY
Qty: 90 TABLET | Refills: 2 | Status: SHIPPED | OUTPATIENT
Start: 2024-12-18

## 2024-12-18 NOTE — PROGRESS NOTES
Physical Therapy Re Certification Of Plan of Care  [address]  [place] [address]  [phone]  Patient: Olu Izquierdo   : 1940  Diagnosis/ICD-10 Code:  Acute right-sided low back pain with right-sided sciatica [M54.41]  Referring practitioner: Edenilson Henry MD  Date of Initial Visit: Type: THERAPY  Noted: 2024  Today's Date: 2024  Patient seen for 10 sessions         Visit Diagnoses:    ICD-10-CM ICD-9-CM   1. Acute right-sided low back pain with right-sided sciatica  M54.41 724.2     724.3         Olu Izquierdo reports: I am having a bad day with R foot and hip pain. I used to get relief with HEP but didn't work today. I can't keep on like this with this pain. I never thought I would look forward to surgery but I am  Subjective Questionnaire: Oswestry: NT today  Clinical Progress: unchanged  Home Program Compliance: Yes  Treatment has included: therapeutic exercise, manual therapy, therapeutic activity, traction, moist heat, and cryotherapy      Subjective       Objective   Strength/Myotome Testing      Left Hip   Planes of Motion   Flexion: 4+  Left hip extension strength: 4-  Abduction: 5  External rotation: 4+  Internal rotation: 4+     Right Hip   Planes of Motion   Flexion: 4+  Right hip extension strength: 4-pain  Abduction: 5  External rotation: 4+  Internal rotation: 4+     Left Knee   Flexion: 5  Extension: 5     Right Knee   Flexion: 5  Extension: 5     R ankle DF 5/5 w/o pain  Can do B heelraise    Tests:  (-) sitting SLR  (+) supine SLR ~ 50 degrees. Had been negative a couple times in the past 2 weeks    Assessment/Plan  We have done manual therapy for hip mobility and  lumbar decompression, therex for core and mobility, lumbar traction. Only temporary relief of R radicular symptoms. MAking it difficult and painful to care for his disabled wife. Sees primary care MD tomorrow and MRI scheduled 25. He wishes it was sooner.       Recommendations: Discharge    Timed:          Manual Therapy:    15     mins  51796;     Therapeutic Exercise:         mins  97562;     Neuromuscular Tremayne:        mins  58389;    Therapeutic Activity:          mins  40091;     Gait Training:           mins  15310;     Ultrasound:          mins  64827;    Ionto                                   mins   25129  Self Care                            mins   82601  Canalith Repos         mins 31954      Un-Timed:  Electrical Stimulation:         mins  79970 ( );  Dry Needling          mins self-pay  Traction          mins 16828  Re-Eval                               mins  67965      Timed Treatment:   15   mins   Total Treatment:     25   mins          PT: Radha Kumar PT     [place] License:  [id number]    Electronically signed by Radha Kumar PT, 12/18/24, 5:08 AM EST    Certification Period: 12/18/2024 thru 3/17/2025  I certify that the therapy services are furnished while this patient is under my care.  The services outlined above are required by this patient, and will be reviewed every 90 days.         Physician Signature:__________________________________________________    PHYSICIAN: Edenilson Henry MD  NPI: [id number]                                      DATE:  :     Please sign and return via fax to [email or web address] . Thank you, [facility] Physical Therapy

## 2024-12-18 NOTE — PROGRESS NOTES
"Chief Complaint  Sciatica of right side    Subjective        Olu Izquierdo presents to [facility]  History of Present Illness    12 weeks of worsening right sided sciatica.  Very problematic for him.  Failed physical therapy.  Currently taking gabapentin 3 mg twice a day with no effect.  He describes pain in his buttocks going down to the right big toe.  It occurs simultaneously.  Worse with prolonged movement.  Worse when he lies down.  He has a history of spinal stenosis at L2-L3 with previous surgery.  I repeated lumbar films recently.  Shows the ongoing retrolisthesis at L2-L3.  And degenerative changes in the foramina L4-L5.  I reviewed the images myself with the patient.  The pain is exacerbated by the fact that he cares for his wife who has reported dementia.  And when he has to lift or bend it is very difficult for him.  He also describes weakness in the right quadriceps at times.  He states his leg will \"give out\".  MRI has been ordered.  It is scheduled for 2 weeks.    Objective   Vital Signs:  /67   Pulse 77   Temp 96.9 °F (36.1 °C) (Temporal)   Ht 182.9 cm (72.01\")   Wt 86.6 kg (191 lb)   SpO2 97%   BMI 25.90 kg/m²   Estimated body mass index is 25.9 kg/m² as calculated from the following:    Height as of this encounter: 182.9 cm (72.01\").    Weight as of this encounter: 86.6 kg (191 lb).            Physical Exam  Constitutional:       Comments: He looks mild to moderately uncomfortable at rest.  His right leg is stretched out.  He has a cane.  He looks tired.   Cardiovascular:      Rate and Rhythm: Normal rate.   Pulmonary:      Effort: Pulmonary effort is normal.   Psychiatric:      Comments: Mood mildly anxious.        Result Review :                Assessment and Plan   Diagnoses and all orders for this visit:    1. Sciatica of right side (Primary)  -     HYDROcodone-acetaminophen (NORCO) 5-325 MG per tablet; Take 1 tablet by mouth Every 6 (Six) Hours As " Needed for Severe Pain (sciatica).  Dispense: 12 tablet; Refill: 0  -     gabapentin (NEURONTIN) 600 MG tablet; Take 1 tablet by mouth 3 (Three) Times a Day.  Dispense: 90 tablet; Refill: 2  -     Ambulatory Referral to Pain Management  -     Ambulatory Referral to Neurosurgery    2. Spinal stenosis of lumbar region, unspecified whether neurogenic claudication present  -     HYDROcodone-acetaminophen (NORCO) 5-325 MG per tablet; Take 1 tablet by mouth Every 6 (Six) Hours As Needed for Severe Pain (sciatica).  Dispense: 12 tablet; Refill: 0  -     gabapentin (NEURONTIN) 600 MG tablet; Take 1 tablet by mouth 3 (Three) Times a Day.  Dispense: 90 tablet; Refill: 2  -     Ambulatory Referral to Pain Management  -     Ambulatory Referral to Neurosurgery    Severe sciatica with worsening symptoms and intermittent right leg weakness at the quadriceps.  MRI is going to be pushed up as best we can.  I am referring to both pain management and neurosurgery.  I am increasing his gabapentin from 300 mg twice a day up to 600 mg 3 times a day.  If causing sedation, break tablet in half.  I am also prescribing a short-term hydrocodone prescription.  Mostly for nighttime.  12 pills with no refills.  Patient is aware these are controlled substances.  The hydrocodone may be habit-forming.  May cause tolerance and dependence.  Both these medications may increase fall risk.  Patient is aware of this.  He was counseled.  At this time the benefits outweigh the risks.  I will see him back in 2 weeks for recheck.  Sooner as needed.         Follow Up   No follow-ups on file.  Patient was given instructions and counseling regarding his condition or for health maintenance advice. Please see specific information pulled into the AVS if appropriate.

## 2024-12-31 ENCOUNTER — TELEPHONE (OUTPATIENT)
Dept: FAMILY MEDICINE CLINIC | Facility: CLINIC | Age: 84
End: 2024-12-31
Payer: MEDICARE

## 2024-12-31 NOTE — TELEPHONE ENCOUNTER
"RELAYED DR NIC PANIAGUA TO PT (SEE BELOW) PT UNDERSTOOD W/O QUESTIONS      \"I reviewed the radiology report.  The MRI reveals multiple levels of disc degeneration with moderate to severe spinal stenosis, especially at L2-L3.  No definite severe disc herniation.  I recommend following up with neurosurgery and pain management as discussed at last visit.  Referrals were placed at the last visit.  Bring disc image to neurosurgical office.\"  "

## 2025-01-02 ENCOUNTER — TELEPHONE (OUTPATIENT)
Dept: NEUROSURGERY | Facility: CLINIC | Age: 85
End: 2025-01-02
Payer: MEDICARE

## 2025-01-02 NOTE — TELEPHONE ENCOUNTER
LM FOR PATIENT INFORMING THAT APPT ON 1/21/25 HAS BEEN R/S FOR 1/17/25 DUE TO DR PALACIOS NOT BEING IN THE OFFICE     HUB OK TO GIVE MESSAGE

## 2025-01-09 ENCOUNTER — TELEPHONE (OUTPATIENT)
Dept: FAMILY MEDICINE CLINIC | Facility: CLINIC | Age: 85
End: 2025-01-09
Payer: MEDICARE

## 2025-01-09 NOTE — TELEPHONE ENCOUNTER
HUB RELAY:  Left vm w/ patient to yevgeniy appt from 1/6/2025 that was canceled due to weather. Adv to call back or make new appt via my chart. I am also sending a messg via my chart

## 2025-01-13 NOTE — H&P (VIEW-ONLY)
Subjective   Patient ID: Olu Izquierdo is a 84 y.o. male is being seen for consultation today at the request of Edenilson Henry MD for SPINAL STENOSIS OF LUMBAR REGION. Patient had a XR SPINE LUMBAR 12-3-24, and MRI L[facility]INE 12-22-24[facility]     Treatment: Physical therapy    Today patient states pain in low back, pain in R hip, R leg, numbness, tingling, weakness.    History of Present Illness    This patient had surgery in November 2019 on the right side at L2-3.  He did well until about 4 months ago when he had pain in his back with radiation into his right leg.  It hurts in his right buttock and his right foot but just a little bit in his right thigh and right calf.  Recently the right leg has gotten better but his back still hurts a lot.  He never had any pain on the left.  He has done physical therapy but never had any injections.    The following portions of the patient's history were reviewed and updated as appropriate: allergies, current medications, past family history, past medical history, past social history, past surgical history, and problem list.    Review of Systems      Objective     Vitals:    01/17/25 1252   BP: 126/64   Pulse: 76   SpO2: 98%   PainSc:   6   PainLoc: Back     There is no height or weight on file to calculate BMI.    Tobacco Use: Low Risk  (1/17/2025)    Patient History     Smoking Tobacco Use: Never     Smokeless Tobacco Use: Never     Passive Exposure: Not on file          Physical Exam    Constitutional:       Appearance: She is well-developed.   HENT:      Head: Normocephalic and atraumatic.   Eyes:      General: Lids are normal.      Extraocular Movements: Extraocular movements intact.      Conjunctiva/sclera: Conjunctivae normal.      Pupils: Pupils are equal, round, and reactive to light.   Neck:      Vascular: No carotid bruit.   Neurological:      Motor: Motor strength is normal.     Coordination: Coordination is intact.      Deep Tendon Reflexes:      Reflex Scores:        Tricep reflexes are 2+ on the right side and 2+ on the left side.       Bicep reflexes are 2+ on the right side and 2+ on the left side.       Brachioradialis reflexes are 2+ on the right side and 2+ on the left side.       Patellar reflexes are 2+ on the right side and 2+ on the left side.       Achilles reflexes are 2+ on the right side and 2+ on the left side.    Neurological Exam    Mental Status  Awake, alert and oriented to person, place and time. Oriented to person, place and time.    Cranial Nerves  CN II: Visual acuity is normal. Visual fields full to confrontation.  CN III, IV, VI: Extraocular movements intact bilaterally. Normal lids and orbits bilaterally. Pupils equal round and reactive to light bilaterally.  CN V: Facial sensation is normal.  CN VII: Full and symmetric facial movement.  CN IX, X: Palate elevates symmetrically. Normal gag reflex.  CN XI: Shoulder shrug strength is normal.  CN XII: Tongue midline without atrophy or fasciculations.    Motor   Strength is 5/5 throughout all four extremities.    Sensory  Sensation is intact to light touch, pinprick, vibration and proprioception in all four extremities.    Reflexes                                            Right                      Left  Brachioradialis                    2+                         2+  Biceps                                 2+                         2+  Triceps                                2+                         2+  Finger flex                           2+                         2+  Hamstring                            2+                         2+  Patellar                                2+                         2+  Achilles                                2+                         2+    Coordination    Finger-to-nose, rapid alternating movements and heel-to-shin normal bilaterally without dysmetria.    Gait  Normal casual, toe, heel and tandem gait.    Assessment & Plan   Independent Review of Radiographic  Studies:      I personally reviewed the images from the following studies.    I reviewed an MRI of the lumbar spine done on December 22 of last year.  This shows a widely patent canal and neuroforamina at L1-2.  At L2-3 there is some disc bulging and pretty severe stenosis.  L3-4 also shows severe stenosis.  L4-5 is a little narrow but not severely so and it looks like he has had previous surgery on the left at L5-S1 which is a little bit narrowing the neural foramina.  I think an LP could be done at L1-2 as the conus ends well above that level.    Medical Decision Making:      I told the patient that since he is feeling a little bit better it is difficult to know exactly what to do.  He does have significant changes in his lumbar spine and so I think we should go ahead and do a lumbar myelogram.  I told the patient what a myelogram involves.  I explained that there is a 50% chance of developing a bad headache and nausea as a result of the test.  I explained that there is also a very small chance of infection, seizures, and bleeding.  I explained how we would treat a post myelogram headache including bedrest, caffeinated fluids, steroids, and blood patch.  The patient does ask to proceed.    Diagnoses and all orders for this visit:    1. Spinal stenosis of lumbar region with neurogenic claudication (Primary)  -     Obtain Informed Consent; Standing  -     IR Myelogram Lumbar Spine; Future  -     CT Lumbar Spine With Intrathecal Contrast; Future  -     XR Spine Lumbar Complete With Flex & Ext; Future  -     No Lab Testing Needed; Standing  -     dexAMETHasone (DECADRON) 4 MG tablet; Take 2 tablets by mouth Take As Directed. Take both tablets by mouth 2 hours before myelogram  Dispense: 2 tablet; Refill: 0      Return for After radiology test.

## 2025-01-13 NOTE — PROGRESS NOTES
Subjective   Patient ID: Olu Izquierdo is a 84 y.o. male is being seen for consultation today at the request of Edenilson Henry MD for SPINAL STENOSIS OF LUMBAR REGION. Patient had a XR SPINE LUMBAR 12-3-24, and MRI LSPINE 12-22-24@[facility]     Treatment: Physical therapy    Today patient states pain in low back, pain in R hip, R leg, numbness, tingling, weakness.    History of Present Illness    This patient had surgery in November 2019 on the right side at L2-3.  He did well until about 4 months ago when he had pain in his back with radiation into his right leg.  It hurts in his right buttock and his right foot but just a little bit in his right thigh and right calf.  Recently the right leg has gotten better but his back still hurts a lot.  He never had any pain on the left.  He has done physical therapy but never had any injections.    The following portions of the patient's history were reviewed and updated as appropriate: allergies, current medications, past family history, past medical history, past social history, past surgical history, and problem list.    Review of Systems      Objective     Vitals:    01/17/25 1252   BP: 126/64   Pulse: 76   SpO2: 98%   PainSc:   6   PainLoc: Back     There is no height or weight on file to calculate BMI.    Tobacco Use: Low Risk  (1/17/2025)    Patient History     Smoking Tobacco Use: Never     Smokeless Tobacco Use: Never     Passive Exposure: Not on file          Physical Exam    Constitutional:       Appearance: She is well-developed.   HENT:      Head: Normocephalic and atraumatic.   Eyes:      General: Lids are normal.      Extraocular Movements: Extraocular movements intact.      Conjunctiva/sclera: Conjunctivae normal.      Pupils: Pupils are equal, round, and reactive to light.   Neck:      Vascular: No carotid bruit.   Neurological:      Motor: Motor strength is normal.     Coordination: Coordination is intact.      Deep Tendon Reflexes:      Reflex Scores:        Tricep reflexes are 2+ on the right side and 2+ on the left side.       Bicep reflexes are 2+ on the right side and 2+ on the left side.       Brachioradialis reflexes are 2+ on the right side and 2+ on the left side.       Patellar reflexes are 2+ on the right side and 2+ on the left side.       Achilles reflexes are 2+ on the right side and 2+ on the left side.    Neurological Exam    Mental Status  Awake, alert and oriented to person, place and time. Oriented to person, place and time.    Cranial Nerves  CN II: Visual acuity is normal. Visual fields full to confrontation.  CN III, IV, VI: Extraocular movements intact bilaterally. Normal lids and orbits bilaterally. Pupils equal round and reactive to light bilaterally.  CN V: Facial sensation is normal.  CN VII: Full and symmetric facial movement.  CN IX, X: Palate elevates symmetrically. Normal gag reflex.  CN XI: Shoulder shrug strength is normal.  CN XII: Tongue midline without atrophy or fasciculations.    Motor   Strength is 5/5 throughout all four extremities.    Sensory  Sensation is intact to light touch, pinprick, vibration and proprioception in all four extremities.    Reflexes                                            Right                      Left  Brachioradialis                    2+                         2+  Biceps                                 2+                         2+  Triceps                                2+                         2+  Finger flex                           2+                         2+  Hamstring                            2+                         2+  Patellar                                2+                         2+  Achilles                                2+                         2+    Coordination    Finger-to-nose, rapid alternating movements and heel-to-shin normal bilaterally without dysmetria.    Gait  Normal casual, toe, heel and tandem gait.    Assessment & Plan   Independent Review of Radiographic  Studies:      I personally reviewed the images from the following studies.    I reviewed an MRI of the lumbar spine done on December 22 of last year.  This shows a widely patent canal and neuroforamina at L1-2.  At L2-3 there is some disc bulging and pretty severe stenosis.  L3-4 also shows severe stenosis.  L4-5 is a little narrow but not severely so and it looks like he has had previous surgery on the left at L5-S1 which is a little bit narrowing the neural foramina.  I think an LP could be done at L1-2 as the conus ends well above that level.    Medical Decision Making:      I told the patient that since he is feeling a little bit better it is difficult to know exactly what to do.  He does have significant changes in his lumbar spine and so I think we should go ahead and do a lumbar myelogram.  I told the patient what a myelogram involves.  I explained that there is a 50% chance of developing a bad headache and nausea as a result of the test.  I explained that there is also a very small chance of infection, seizures, and bleeding.  I explained how we would treat a post myelogram headache including bedrest, caffeinated fluids, steroids, and blood patch.  The patient does ask to proceed.    Diagnoses and all orders for this visit:    1. Spinal stenosis of lumbar region with neurogenic claudication (Primary)  -     Obtain Informed Consent; Standing  -     IR Myelogram Lumbar Spine; Future  -     CT Lumbar Spine With Intrathecal Contrast; Future  -     XR Spine Lumbar Complete With Flex & Ext; Future  -     No Lab Testing Needed; Standing  -     dexAMETHasone (DECADRON) 4 MG tablet; Take 2 tablets by mouth Take As Directed. Take both tablets by mouth 2 hours before myelogram  Dispense: 2 tablet; Refill: 0      Return for After radiology test.

## 2025-01-14 NOTE — TELEPHONE ENCOUNTER
Rx Refill Note  Requested Prescriptions     Pending Prescriptions Disp Refills    metoprolol succinate XL (TOPROL-XL) 25 MG 24 hr tablet [Pharmacy Med Name: METOPROLOL ER SUCCINATE 25MG TABS] 90 tablet 1     Sig: TAKE 1 TABLET BY MOUTH DAILY      Last office visit with prescribing clinician: 12/18/2024   Last telemedicine visit with prescribing clinician: Visit date not found   Next office visit with prescribing clinician: 3/11/2025                         Would you like a call back once the refill request has been completed: [] Yes [] No    If the office needs to give you a call back, can they leave a voicemail: [] Yes [] No    Polly Vyas  01/14/25, 14:56 EST

## 2025-01-15 RX ORDER — METOPROLOL SUCCINATE 25 MG/1
25 TABLET, EXTENDED RELEASE ORAL DAILY
Qty: 90 TABLET | Refills: 1 | Status: SHIPPED | OUTPATIENT
Start: 2025-01-15

## 2025-01-17 ENCOUNTER — OFFICE VISIT (OUTPATIENT)
Dept: NEUROSURGERY | Facility: CLINIC | Age: 85
End: 2025-01-17
Payer: MEDICARE

## 2025-01-17 ENCOUNTER — TELEPHONE (OUTPATIENT)
Dept: NEUROSURGERY | Facility: CLINIC | Age: 85
End: 2025-01-17

## 2025-01-17 VITALS — DIASTOLIC BLOOD PRESSURE: 64 MMHG | HEART RATE: 76 BPM | OXYGEN SATURATION: 98 % | SYSTOLIC BLOOD PRESSURE: 126 MMHG

## 2025-01-17 DIAGNOSIS — M48.062 SPINAL STENOSIS OF LUMBAR REGION WITH NEUROGENIC CLAUDICATION: Primary | ICD-10-CM

## 2025-01-17 PROCEDURE — 99204 OFFICE O/P NEW MOD 45 MIN: CPT | Performed by: NEUROLOGICAL SURGERY

## 2025-01-17 PROCEDURE — 3078F DIAST BP <80 MM HG: CPT | Performed by: NEUROLOGICAL SURGERY

## 2025-01-17 PROCEDURE — 1160F RVW MEDS BY RX/DR IN RCRD: CPT | Performed by: NEUROLOGICAL SURGERY

## 2025-01-17 PROCEDURE — 3074F SYST BP LT 130 MM HG: CPT | Performed by: NEUROLOGICAL SURGERY

## 2025-01-17 PROCEDURE — 1159F MED LIST DOCD IN RCRD: CPT | Performed by: NEUROLOGICAL SURGERY

## 2025-01-17 RX ORDER — METHYLPREDNISOLONE 4 MG/1
TABLET ORAL
Qty: 21 TABLET | Refills: 0 | Status: SHIPPED | OUTPATIENT
Start: 2025-01-17

## 2025-01-17 RX ORDER — DEXAMETHASONE 4 MG/1
8 TABLET ORAL TAKE AS DIRECTED
Qty: 2 TABLET | Refills: 0 | Status: SHIPPED | OUTPATIENT
Start: 2025-01-17

## 2025-01-17 NOTE — TELEPHONE ENCOUNTER
"S/w patient and informed per Dr. Dudley,    \"I cannot give narcotic pain medications.  I did put a prescription in for a Medrol Dosepak.\"    Patient expressed understanding     "

## 2025-01-17 NOTE — TELEPHONE ENCOUNTER
Patient was just seen by Dr. Dudley and forgot to ask for something for pain. Please send to [facility].

## 2025-01-23 NOTE — PROGRESS NOTES
02/04/25 0001   Pre-Procedure Phone Call   Procedure Time Verified Yes   Arrival Time 0600   Procedure Location Verified Yes   Medical History Reviewed No   NPO Status Reinforced Yes   Ride and Caregiver Arranged Yes   Patient Knows to Bring Current Medications No   Bring Outside Films Requested No

## 2025-01-27 ENCOUNTER — TELEPHONE (OUTPATIENT)
Dept: NEUROSURGERY | Facility: CLINIC | Age: 85
End: 2025-01-27
Payer: MEDICARE

## 2025-01-27 DIAGNOSIS — M48.062 SPINAL STENOSIS OF LUMBAR REGION WITH NEUROGENIC CLAUDICATION: Primary | ICD-10-CM

## 2025-01-27 RX ORDER — METHYLPREDNISOLONE 4 MG/1
TABLET ORAL
Qty: 1 EACH | Refills: 0 | Status: SHIPPED | OUTPATIENT
Start: 2025-01-27

## 2025-01-27 NOTE — TELEPHONE ENCOUNTER
"S/w patient and informed per Dr. Dudley,    \"I had recommended proceeding with a myelogram and he had asked to proceed.  We can try him on a Medrol Dosepak but I do not have much else to offer without the myelogram.\"    Patient expressed understanding       "

## 2025-01-27 NOTE — TELEPHONE ENCOUNTER
Caller: PATIENT    Relationship: SELF    Best call back number: [phone]    What is the best time to reach you: ANYTIME    Who are you requesting to speak with (clinical staff, provider,  specific staff member): CLINICAL STAFF    Do you know the name of the person who called: NA    What was the call regarding: PATIENT CALLED AND STATES THAT HIS RIGHT LEG STARTED HURTING AGAIN YESTERDAY-PATIENT STATES THAT HE WAS TRYING TO REMEMBER WHAT  HAD ADVISED IF THE PAIN WERE TO RETURN-PATIENT STATES THAT HE FELT LIKE  WOULD HAVE MORE EXPLANATION INTO HOW HE WAS FEELING AND WOULD NOT NEED THE MYELOGRAM-PATIENT IS ASKING FOR A CALL BACK TO ADVISE ASAP THANK YOU    Is it okay if the provider responds through [email or web address]: NO

## 2025-02-04 ENCOUNTER — HOSPITAL ENCOUNTER (OUTPATIENT)
Dept: GENERAL RADIOLOGY | Facility: HOSPITAL | Age: 85
Discharge: HOME | End: 2025-02-04
Payer: MEDICARE

## 2025-02-04 ENCOUNTER — HOSPITAL ENCOUNTER (OUTPATIENT)
Dept: CT IMAGING | Facility: HOSPITAL | Age: 85
Discharge: HOME | End: 2025-02-04
Payer: MEDICARE

## 2025-02-04 VITALS
HEIGHT: 72 IN | BODY MASS INDEX: 25.47 KG/M2 | HEART RATE: 77 BPM | OXYGEN SATURATION: 95 % | WEIGHT: 188 LBS | RESPIRATION RATE: 18 BRPM | TEMPERATURE: 98.1 F | SYSTOLIC BLOOD PRESSURE: 134 MMHG | DIASTOLIC BLOOD PRESSURE: 74 MMHG

## 2025-02-04 DIAGNOSIS — M48.062 SPINAL STENOSIS OF LUMBAR REGION WITH NEUROGENIC CLAUDICATION: ICD-10-CM

## 2025-02-04 PROCEDURE — A9270 NON-COVERED ITEM OR SERVICE: HCPCS | Performed by: NEUROLOGICAL SURGERY

## 2025-02-04 PROCEDURE — 25510000001 IOPAMIDOL 41 % SOLUTION: Performed by: NEUROLOGICAL SURGERY

## 2025-02-04 PROCEDURE — 62304 MYELOGRAPHY LUMBAR INJECTION: CPT

## 2025-02-04 PROCEDURE — 25010000002 LIDOCAINE 1 % SOLUTION: Performed by: NEUROLOGICAL SURGERY

## 2025-02-04 PROCEDURE — 72132 CT LUMBAR SPINE W/DYE: CPT

## 2025-02-04 PROCEDURE — 63710000001 HYDROCODONE-ACETAMINOPHEN 5-325 MG TABLET: Performed by: NEUROLOGICAL SURGERY

## 2025-02-04 PROCEDURE — 72114 X-RAY EXAM L-S SPINE BENDING: CPT

## 2025-02-04 PROCEDURE — 72240 MYELOGRAPHY NECK SPINE: CPT

## 2025-02-04 RX ORDER — IOPAMIDOL 408 MG/ML
20 INJECTION, SOLUTION INTRATHECAL
Status: COMPLETED | OUTPATIENT
Start: 2025-02-04 | End: 2025-02-04

## 2025-02-04 RX ORDER — LIDOCAINE HYDROCHLORIDE 10 MG/ML
10 INJECTION, SOLUTION INFILTRATION; PERINEURAL ONCE
Status: COMPLETED | OUTPATIENT
Start: 2025-02-04 | End: 2025-02-04

## 2025-02-04 RX ORDER — ACETAMINOPHEN 325 MG/1
650 TABLET ORAL EVERY 4 HOURS PRN
Status: DISCONTINUED | OUTPATIENT
Start: 2025-02-04 | End: 2025-02-05 | Stop reason: HOSPADM

## 2025-02-04 RX ORDER — HYDROCODONE BITARTRATE AND ACETAMINOPHEN 5; 325 MG/1; MG/1
1 TABLET ORAL EVERY 4 HOURS PRN
Status: DISCONTINUED | OUTPATIENT
Start: 2025-02-04 | End: 2025-02-05 | Stop reason: HOSPADM

## 2025-02-04 RX ADMIN — HYDROCODONE BITARTRATE AND ACETAMINOPHEN 1 TABLET: 5; 325 TABLET ORAL at 07:50

## 2025-02-04 RX ADMIN — LIDOCAINE HYDROCHLORIDE 7 ML: 10 INJECTION, SOLUTION INFILTRATION; PERINEURAL at 07:26

## 2025-02-04 RX ADMIN — IOPAMIDOL 20 ML: 408 INJECTION, SOLUTION INTRATHECAL at 07:31

## 2025-02-04 NOTE — DISCHARGE INSTRUCTIONS
EDUCATION /DISCHARGE INSTRUCTIONS:    A myelogram is a special radiology procedure of the spinal cord, spinal nerves and other related structures.  You will be awake during the examination.  An area of your lower back will be cleansed with an antiseptic solution.  The physician will inject a numbing medication in your lower back.  While your back is numb, a needle will be placed in the lower back area.  A small amount of spinal fluid may be withdrawn and sent to the lab if ordered by your physician. While the needle is in the back, an injection of a contrast material (xray dye) will be given through the needle.  The contrast material will allow the physician to see the spinal cord and spinal nerves.  Once injected, the needle will be removed and a band aid will be placed over the injection site.  The table will be tilted during the process to allow the contrast material to flow to particular areas in the spine.  Following the injection and xrays, you will be taken to the CT scanner where more pictures will be taken. After the procedure is finished, the contrast material will be absorbed by your body and eliminated through your kidneys.  The radiologist will study and interpret your myelogram and send the results to your physician.  Procedure risks of a myelogram include, but are not limited to:  *  Bleeding   *  Seizure  *  Infection   *  Headache, possibly severe requiring a blood patch  *  Contrast reaction  *  Nerve or cord injury  *  Paralysis and death  Benefits of the procedure:  *  Best examination for delineating pathology related to spinal cord compression from a disc and/or nerve root compression  Alternatives to the procedure:  MRI - a non invasive procedure requiring intravenous contrast injection.  Cannot be done on patients with certain pacemakers or metal in the body.  MRI risks include possible reaction to the contrast material, movement of metal located in the body.Benefit to MRI:  Non-invasive and  usually painless procedure.  THIS EDUCATION INFORMATION WAS REVIEWED PRIOR TO PROCEDURE AND CONSENT.     24 hour rest period ends _____Wednesday 2/5/25_______________.  Important information following your myelogram:  * ACTIVITY:   *  You may sit up in the car to go home.  *  When you get home, remain on bed rest (flat on your back or on your side) for 24 hours. You may place a rolled up towel under your neck for support  * You may get up to the bathroom and sit up to eat and drink then lie back down  * Drink additional fluids for 24 hours after the myelogram.   * Continue to drink additional fluids for the next 2-3 days. Water and caffeinated beverages are encouraged.  * Remain less active for the next two to three days.  * Do not drive for 24 hours following a myelogram.  * You may remove the bandage and shower in the morning.    CALL YOUR PHYSICIAN FOR THE FOLLOWING:  * Pain at the injection site  * Redness, swelling, bruising or drainage at the injection site.  * A fever by mouth of 101.0 or any new symptoms  Headaches are a common side effect after a myelogram.  If you get a headache, you should stay flat in bed and drink plenty of fluids. If the headache persists and does not go away with rest/medication,   CALL Dr. Dudley at [phone]

## 2025-02-10 NOTE — PROGRESS NOTES
Subjective   Patient ID: Olu Izquierdo is a 84 y.o. male is here today for follow-up with a new Lumbar Myelogram done on 2/4/2025.    Today patient's symptoms are unchanged    History of Present Illness    This patient had surgery in November 2019 at L2-3 on the right.  When he was here last month he had been having pain for about 4 months that was in his right buttock his right foot and a little bit in his thigh and calf.  He was mostly just having back pain.  He had no pain on the left.  Currently he is mostly having pain in his right foot.  He has some pain in his right hip and his lower back on the right but most of the severe pain is in his right foot.    The following portions of the patient's history were reviewed and updated as appropriate: allergies, current medications, past family history, past medical history, past social history, past surgical history, and problem list.      Objective     Vitals:    02/11/25 1259   BP: 142/68   Pulse: 81   SpO2: 99%   PainSc: 10-Worst pain ever     There is no height or weight on file to calculate BMI.    Tobacco Use: Low Risk  (2/11/2025)    Patient History     Smoking Tobacco Use: Never     Smokeless Tobacco Use: Never     Passive Exposure: Not on file          Physical Exam    Neurological:      Mental Status: He is alert and oriented to person, place, and time.       Neurological Exam    Mental Status  Alert. Oriented to person, place, and time.            Assessment & Plan   Independent Review of Radiographic Studies:      I personally reviewed the images from the following studies.    I reviewed his plain films, myelogram, and CT scan myself.  The plain films show multilevel degenerative disease.  I do not see any abnormal movement on flexion and extension.  On the myelogram itself there is severe narrowing at L2-3 and L3-4.  It is worse on the right than the left.  On the post myelographic CT scan the lower thoracic spine down to L1 looks okay.  L1-2 is fairly  open.  L2-3 shows regrowth of the bone on the right side in the area of the laminectomy and severe stenosis at that level.  There is also severe stenosis at L3-4.  The facets at both of those levels are fairly sagittal.  L4-5 mostly looks okay and L5-S1 raises the question of some disc bulging to the left but not severe.    I think of any surgery were done he would require an L2-3 and L3-4 laminectomy with fusion.    Medical Decision Making:      I told the patient that from my point of view the pain primarily in his right foot does not really go with a back problem at all much less L2-3 and L3-4.  I think we should check a EMG of his right leg before we discuss any surgery.  We will see him back as soon as EMG has been done.  In the meantime we will try him on some Lyrica.    Diagnoses and all orders for this visit:    1. Spinal stenosis of lumbar region with neurogenic claudication (Primary)  -     EMG & Nerve Conduction Test; Future  -     pregabalin (LYRICA) 100 MG capsule; Take 1 capsule by mouth 2 (Two) Times a Day.  Dispense: 30 capsule; Refill: 0      Return for After EMG.

## 2025-02-11 ENCOUNTER — OFFICE VISIT (OUTPATIENT)
Dept: NEUROSURGERY | Facility: CLINIC | Age: 85
End: 2025-02-11
Payer: MEDICARE

## 2025-02-11 VITALS — DIASTOLIC BLOOD PRESSURE: 68 MMHG | HEART RATE: 81 BPM | SYSTOLIC BLOOD PRESSURE: 142 MMHG | OXYGEN SATURATION: 99 %

## 2025-02-11 DIAGNOSIS — M48.062 SPINAL STENOSIS OF LUMBAR REGION WITH NEUROGENIC CLAUDICATION: Primary | ICD-10-CM

## 2025-02-11 PROCEDURE — 99214 OFFICE O/P EST MOD 30 MIN: CPT | Performed by: NEUROLOGICAL SURGERY

## 2025-02-11 PROCEDURE — 1159F MED LIST DOCD IN RCRD: CPT | Performed by: NEUROLOGICAL SURGERY

## 2025-02-11 PROCEDURE — 1160F RVW MEDS BY RX/DR IN RCRD: CPT | Performed by: NEUROLOGICAL SURGERY

## 2025-02-11 PROCEDURE — 3078F DIAST BP <80 MM HG: CPT | Performed by: NEUROLOGICAL SURGERY

## 2025-02-11 PROCEDURE — 3077F SYST BP >= 140 MM HG: CPT | Performed by: NEUROLOGICAL SURGERY

## 2025-02-11 RX ORDER — PREGABALIN 100 MG/1
100 CAPSULE ORAL 2 TIMES DAILY
Qty: 30 CAPSULE | Refills: 0 | Status: SHIPPED | OUTPATIENT
Start: 2025-02-11

## 2025-02-18 ENCOUNTER — TELEPHONE (OUTPATIENT)
Dept: NEUROSURGERY | Facility: CLINIC | Age: 85
End: 2025-02-18

## 2025-02-18 NOTE — TELEPHONE ENCOUNTER
"Hub staff attempted to follow warm transfer process and was unsuccessful     Caller: Olu Izquierdo \"ALFRED\"    Relationship to patient: Self    Best call back number: [phone]    Patient is needing: PATIENT CALLED AND REQUESTING TO SPEAK WITH RACHELLE REGARDING PROCEDURE SCHEDULING-TRIED TO WT BUT SOMEONE PICKED UP AND CALL DISCONNECTED-SENDING TO OFFICE TO ADVISE OF CALL THANK YOU        "

## 2025-02-24 DIAGNOSIS — M48.062 SPINAL STENOSIS OF LUMBAR REGION WITH NEUROGENIC CLAUDICATION: ICD-10-CM

## 2025-02-24 RX ORDER — PREGABALIN 100 MG/1
100 CAPSULE ORAL 2 TIMES DAILY
Qty: 60 CAPSULE | Refills: 0 | Status: SHIPPED | OUTPATIENT
Start: 2025-02-24

## 2025-02-24 RX ORDER — ATORVASTATIN CALCIUM 10 MG/1
10 TABLET, FILM COATED ORAL DAILY
Qty: 90 TABLET | Refills: 1 | Status: SHIPPED | OUTPATIENT
Start: 2025-02-24

## 2025-02-24 NOTE — TELEPHONE ENCOUNTER
Rx Refill Note  Requested Prescriptions     Pending Prescriptions Disp Refills    atorvastatin (LIPITOR) 10 MG tablet [Pharmacy Med Name: ATORVASTATIN 10MG TABLETS] 90 tablet 1     Sig: TAKE 1 TABLET BY MOUTH DAILY      Last office visit with prescribing clinician: 12/18/2024   Last telemedicine visit with prescribing clinician: Visit date not found   Next office visit with prescribing clinician: 3/11/2025                         Would you like a call back once the refill request has been completed: [] Yes [] No    If the office needs to give you a call back, can they leave a voicemail: [] Yes [] No    Polly Vyas  02/24/25, 09:16 EST

## 2025-02-28 ENCOUNTER — TELEPHONE (OUTPATIENT)
Dept: FAMILY MEDICINE CLINIC | Facility: CLINIC | Age: 85
End: 2025-02-28
Payer: MEDICARE

## 2025-02-28 DIAGNOSIS — E78.00 HYPERCHOLESTEROLEMIA: ICD-10-CM

## 2025-02-28 DIAGNOSIS — I10 ESSENTIAL HYPERTENSION: Primary | ICD-10-CM

## 2025-03-03 DIAGNOSIS — E78.00 HYPERCHOLESTEROLEMIA: ICD-10-CM

## 2025-03-03 DIAGNOSIS — I10 ESSENTIAL HYPERTENSION: ICD-10-CM

## 2025-03-03 NOTE — H&P (VIEW-ONLY)
Subjective   Patient ID: Olu Izquierdo is a 84 y.o. male is here today for follow-up with a new EMG/NCS w/Dr. Jeffrey done on 2/27/2025.    Today patient states that his symptoms are unchanged     History of Present Illness    This patient continues with pain in his lower back his right hip and into his right foot on the  lateral aspect.  He does not have pain in his anterior thigh.    The following portions of the patient's history were reviewed and updated as appropriate: allergies, current medications, past family history, past medical history, past social history, past surgical history, and problem list.      Objective     There were no vitals filed for this visit.  There is no height or weight on file to calculate BMI.    Tobacco Use: Low Risk  (3/4/2025)    Patient History     Smoking Tobacco Use: Never     Smokeless Tobacco Use: Never     Passive Exposure: Not on file          Physical Exam    Neurological:      Mental Status: He is alert and oriented to person, place, and time.       Neurological Exam    Mental Status  Alert. Oriented to person, place, and time.            Assessment & Plan   Independent Review of Radiographic Studies:      I personally reviewed the images from the following studies.    I reviewed an EMG done on 27 February of this year.  This does show a severe peripheral polyneuropathy.  There could be an L5 radiculopathy on the right side.    I again reviewed his plain films, myelogram, and CT scan.  This does show fairly severe stenosis at L2-3 and L3-4.    Medical Decision Making:      I told the patient about the imaging.  I think a lot of his problem is due to the peripheral neuropathy but some of the clearly on the right side is due to a lumbar radiculopathy.  I see little option at this point but to proceed with a lumbar laminectomy and fusion.  He clearly understands that it is difficult to predict outcome in the face of the peripheral neuropathy.  He does wish to proceed.  I told the  patient about the risks, complications and expected outcome of the lumbar surgery.  I explained that there was an 60% chance of getting rid of the pain in the leg.  I explained that there would still be back pain after the surgery.  Initially this will be quite severe but will improve over time.  There is a 2 or 3% chance of infection, bleeding, CSF leak, damage to the nerve as a result of surgery, paralysis, as well as anesthetic risk.  There is a 10% chance of recurrent problems.  There is a 10% chance of nonunion or failure of the instrumentation.  We discussed the postoperative hospital and home course.  The patient does ask to proceed.     He will need to be scheduled for a: Lumbar 2 to lumbar 3 and lumbar 3 to lumbar 4 laminectomy with neurolysis with fusion and instrumentation    Diagnoses and all orders for this visit:    1. Spinal stenosis of lumbar region with neurogenic claudication (Primary)      Return for 2-3 week post op.

## 2025-03-04 ENCOUNTER — OFFICE VISIT (OUTPATIENT)
Dept: NEUROSURGERY | Facility: CLINIC | Age: 85
End: 2025-03-04
Payer: MEDICARE

## 2025-03-04 ENCOUNTER — PREP FOR SURGERY (OUTPATIENT)
Dept: OTHER | Facility: HOSPITAL | Age: 85
End: 2025-03-04
Payer: MEDICARE

## 2025-03-04 DIAGNOSIS — M48.062 SPINAL STENOSIS OF LUMBAR REGION WITH NEUROGENIC CLAUDICATION: Primary | ICD-10-CM

## 2025-03-04 LAB
ALBUMIN SERPL-MCNC: 4.3 G/DL (ref 3.5–5.2)
ALBUMIN/GLOB SERPL: 1.8 G/DL
ALP SERPL-CCNC: 85 U/L (ref 39–117)
ALT SERPL-CCNC: 15 U/L (ref 1–41)
AST SERPL-CCNC: 22 U/L (ref 1–40)
BILIRUB SERPL-MCNC: 0.4 MG/DL (ref 0–1.2)
BUN SERPL-MCNC: 24 MG/DL (ref 8–23)
BUN/CREAT SERPL: 22.4 (ref 7–25)
CALCIUM SERPL-MCNC: 9.8 MG/DL (ref 8.6–10.5)
CHLORIDE SERPL-SCNC: 102 MMOL/L (ref 98–107)
CHOLEST SERPL-MCNC: 182 MG/DL (ref 0–200)
CO2 SERPL-SCNC: 27.1 MMOL/L (ref 22–29)
CREAT SERPL-MCNC: 1.07 MG/DL (ref 0.76–1.27)
EGFRCR SERPLBLD CKD-EPI 2021: 68.9 ML/MIN/1.73
GLOBULIN SER CALC-MCNC: 2.4 GM/DL
GLUCOSE SERPL-MCNC: 101 MG/DL (ref 65–99)
HDLC SERPL-MCNC: 67 MG/DL (ref 40–60)
LDLC SERPL CALC-MCNC: 90 MG/DL (ref 0–100)
POTASSIUM SERPL-SCNC: 5.1 MMOL/L (ref 3.5–5.2)
PROT SERPL-MCNC: 6.7 G/DL (ref 6–8.5)
SODIUM SERPL-SCNC: 142 MMOL/L (ref 136–145)
TRIGL SERPL-MCNC: 149 MG/DL (ref 0–150)
VLDLC SERPL CALC-MCNC: 25 MG/DL (ref 5–40)

## 2025-03-04 PROCEDURE — 1160F RVW MEDS BY RX/DR IN RCRD: CPT | Performed by: NEUROLOGICAL SURGERY

## 2025-03-04 PROCEDURE — 99214 OFFICE O/P EST MOD 30 MIN: CPT | Performed by: NEUROLOGICAL SURGERY

## 2025-03-04 PROCEDURE — 1159F MED LIST DOCD IN RCRD: CPT | Performed by: NEUROLOGICAL SURGERY

## 2025-03-11 ENCOUNTER — OFFICE VISIT (OUTPATIENT)
Dept: FAMILY MEDICINE CLINIC | Facility: CLINIC | Age: 85
End: 2025-03-11
Payer: MEDICARE

## 2025-03-11 VITALS
SYSTOLIC BLOOD PRESSURE: 105 MMHG | DIASTOLIC BLOOD PRESSURE: 60 MMHG | BODY MASS INDEX: 26.01 KG/M2 | HEIGHT: 72 IN | WEIGHT: 192 LBS | OXYGEN SATURATION: 96 % | TEMPERATURE: 97.3 F | HEART RATE: 72 BPM

## 2025-03-11 DIAGNOSIS — G62.9 NEUROPATHY: Primary | ICD-10-CM

## 2025-03-11 DIAGNOSIS — R79.9 ABNORMAL FINDING OF BLOOD CHEMISTRY, UNSPECIFIED: ICD-10-CM

## 2025-03-11 DIAGNOSIS — E78.00 HYPERCHOLESTEROLEMIA: ICD-10-CM

## 2025-03-11 DIAGNOSIS — M48.061 SPINAL STENOSIS OF LUMBAR REGION, UNSPECIFIED WHETHER NEUROGENIC CLAUDICATION PRESENT: ICD-10-CM

## 2025-03-11 DIAGNOSIS — I10 ESSENTIAL HYPERTENSION: ICD-10-CM

## 2025-03-11 LAB
BASOPHILS # BLD AUTO: 0.07 10*3/MM3 (ref 0–0.2)
BASOPHILS NFR BLD AUTO: 0.9 % (ref 0–1.5)
CRP SERPL-MCNC: <0.3 MG/DL (ref 0–0.5)
EOSINOPHIL # BLD AUTO: 0.58 10*3/MM3 (ref 0–0.4)
EOSINOPHIL NFR BLD AUTO: 7.1 % (ref 0.3–6.2)
ERYTHROCYTE [DISTWIDTH] IN BLOOD BY AUTOMATED COUNT: 11.5 % (ref 12.3–15.4)
ERYTHROCYTE [SEDIMENTATION RATE] IN BLOOD BY WESTERGREN METHOD: 6 MM/HR (ref 0–20)
HBA1C MFR BLD: 5.6 % (ref 4.8–5.6)
HCT VFR BLD AUTO: 40.1 % (ref 37.5–51)
HGB BLD-MCNC: 13.3 G/DL (ref 13–17.7)
IMM GRANULOCYTES # BLD AUTO: 0.03 10*3/MM3 (ref 0–0.05)
IMM GRANULOCYTES NFR BLD AUTO: 0.4 % (ref 0–0.5)
LYMPHOCYTES # BLD AUTO: 1.68 10*3/MM3 (ref 0.7–3.1)
LYMPHOCYTES NFR BLD AUTO: 20.5 % (ref 19.6–45.3)
MCH RBC QN AUTO: 31.4 PG (ref 26.6–33)
MCHC RBC AUTO-ENTMCNC: 33.2 G/DL (ref 31.5–35.7)
MCV RBC AUTO: 94.8 FL (ref 79–97)
MONOCYTES # BLD AUTO: 1.05 10*3/MM3 (ref 0.1–0.9)
MONOCYTES NFR BLD AUTO: 12.8 % (ref 5–12)
NEUTROPHILS # BLD AUTO: 4.78 10*3/MM3 (ref 1.7–7)
NEUTROPHILS NFR BLD AUTO: 58.3 % (ref 42.7–76)
NRBC BLD AUTO-RTO: 0 /100 WBC (ref 0–0.2)
PLATELET # BLD AUTO: 222 10*3/MM3 (ref 140–450)
RBC # BLD AUTO: 4.23 10*6/MM3 (ref 4.14–5.8)
TSH SERPL DL<=0.005 MIU/L-ACNC: 1.88 UIU/ML (ref 0.27–4.2)
VIT B12 SERPL-MCNC: 459 PG/ML (ref 211–946)
WBC # BLD AUTO: 8.19 10*3/MM3 (ref 3.4–10.8)

## 2025-03-11 RX ORDER — HYDROCODONE BITARTRATE AND ACETAMINOPHEN 7.5; 325 MG/1; MG/1
1 TABLET ORAL EVERY 6 HOURS PRN
Qty: 12 TABLET | Refills: 0 | Status: ON HOLD | OUTPATIENT
Start: 2025-03-11

## 2025-03-11 NOTE — PROGRESS NOTES
"Chief Complaint  Peripheral Neuropathy and Pain (Surgery on Monday )    Subjective        Olu Izquierdo presents to [facility]  Peripheral Neuropathy  Pain      Follow-up spinal stenosis, lumbar radiculopathy on the right side, and newly diagnosed on EMG nerve conduction study peripheral sensory polyneuropathy.  He states he feels like his feet are in the mud.  This all started in October when he started having the severe and ongoing right sided lumbar radiculopathy.  Primarily severe pain along the dorsal aspect of his right lower extremity and foot.  The pain was very severe last night he had trouble sleeping.  He has been worked up by his neurosurgeon.  And is scheduled for spinal fusion next week.  He continues pregabalin 100 mg twice a day prescribed by them.  He continues his other medication including blood pressure medicine.  Chest pain.  No shortness of breath.  He otherwise feels fine.  No history of diabetes.    Objective   Vital Signs:  /60   Pulse 72   Temp 97.3 °F (36.3 °C) (Temporal)   Ht 182.9 cm (72\")   Wt 87.1 kg (192 lb)   SpO2 96%   BMI 26.04 kg/m²   Estimated body mass index is 26.04 kg/m² as calculated from the following:    Height as of this encounter: 182.9 cm (72\").    Weight as of this encounter: 87.1 kg (192 lb).            Physical Exam  Vitals and nursing note reviewed.   Constitutional:       General: He is not in acute distress.     Appearance: He is well-developed.   Cardiovascular:      Rate and Rhythm: Normal rate and regular rhythm.      Heart sounds: Normal heart sounds.   Pulmonary:      Effort: Pulmonary effort is normal.      Breath sounds: Normal breath sounds.   Skin:     General: Skin is warm and dry.        Result Review :  The following data was reviewed by: Edenilson Henry MD on 03/11/2025:  Common labs          9/6/2024    10:18 3/4/2025    10:08   Common Labs   Glucose 97  101    BUN 24  24    Creatinine 1.13  1.07    Sodium " 136  142    Potassium 4.6  5.1    Chloride 102  102    Calcium 9.5  9.8    Albumin 4.1  4.3    Total Bilirubin 0.5  0.4    Alkaline Phosphatase 83  85    AST (SGOT) 18  22    ALT (SGPT) 16  15    WBC 9.31     Hemoglobin 13.4     Hematocrit 41.2     Platelets 268     Total Cholesterol 156  182    Triglycerides 86  149    HDL Cholesterol 61  67    LDL Cholesterol  79  90                  Assessment and Plan   Diagnoses and all orders for this visit:    1. Neuropathy (Primary)  -     CBC & Differential  -     TSH Rfx On Abnormal To Free T4  -     Vitamin B12  -     Sedimentation Rate  -     C-reactive Protein  -     Hemoglobin A1c    2. Essential hypertension    3. Hypercholesterolemia    4. Spinal stenosis of lumbar region, unspecified whether neurogenic claudication present  -     CBC & Differential  -     TSH Rfx On Abnormal To Free T4  -     Vitamin B12  -     Sedimentation Rate  -     C-reactive Protein  -     Hemoglobin A1c  -     HYDROcodone-acetaminophen (NORCO) 7.5-325 MG per tablet; Take 1 tablet by mouth Every 6 (Six) Hours As Needed (Spinal stenosis pain prior to surgery).  Dispense: 12 tablet; Refill: 0    5. Abnormal finding of blood chemistry, unspecified  -     Hemoglobin A1c    Sensory polyneuropathy of lower extremity with also EMG/nerve conduction study evidence of L5 on a right-sided lumbar radiculopathy.  That unilateral right-sided pain is his most severe problem at this time.  He continues Lyrica.  I am adding a short course of hydrocodone prior to the upcoming surgery next week.  At this time I see no contraindications to upcoming surgery.  I am also going to do a brief workup with regards to other causes of sensory polyneuropathy, although most likely related to some myelopathy from the spinal stenosis.  I will see him back in 3 weeks for recheck s/p hospitalization.  And above lab work today.    Hypertension hyperlipidemia.  Stable.         Follow Up   No follow-ups on file.  Patient was given  instructions and counseling regarding his condition or for health maintenance advice. Please see specific information pulled into the AVS if appropriate.

## 2025-03-12 ENCOUNTER — PRE-ADMISSION TESTING (OUTPATIENT)
Dept: PREADMISSION TESTING | Facility: HOSPITAL | Age: 85
End: 2025-03-12
Payer: MEDICARE

## 2025-03-12 VITALS
HEART RATE: 80 BPM | BODY MASS INDEX: 26.29 KG/M2 | WEIGHT: 194.1 LBS | RESPIRATION RATE: 18 BRPM | OXYGEN SATURATION: 96 % | TEMPERATURE: 97.2 F | DIASTOLIC BLOOD PRESSURE: 55 MMHG | SYSTOLIC BLOOD PRESSURE: 108 MMHG | HEIGHT: 72 IN

## 2025-03-12 LAB
QT INTERVAL: 380 MS
QTC INTERVAL: 408 MS

## 2025-03-12 PROCEDURE — 93005 ELECTROCARDIOGRAM TRACING: CPT

## 2025-03-12 NOTE — DISCHARGE INSTRUCTIONS
Take the following medications the morning of surgery:  LYRICA, HYDRALAZINE      If you are on prescription narcotic pain medication to control your pain you may also take that medication the morning of surgery.      General Instructions:     Do not eat solid food after midnight the night before surgery.  Clear liquids day of surgery are allowed but must be stopped at least two hours before your hospital arrival time.       Allowed clear liquids      Water, sodas, and tea or coffee with no cream or milk added.       12 to 20 ounces of a clear liquid that contains carbohydrates is recommended.  If non-diabetic, have Gatorade or Powerade.  If diabetic, have G2 or Powerade Zero.     Do not have liquids red in color.  Do not consume chicken, beef, pork or vegetable broth or bouillon cubes of any variety as they are not considered clear liquids and are not allowed.      Infants may have breast milk up to four hours before surgery.  Infants drinking formula may drink formula up to six hours before surgery.   Patients who avoid smoking, chewing tobacco and alcohol for 4 weeks prior to surgery have a reduced risk of post-operative complications.  Quit smoking as many days before surgery as you can.  Do not smoke, use chewing tobacco or drink alcohol the day of surgery.   If applicable bring your C-PAP/ BI-PAP machine in with you to preop day of surgery.  Bring any papers given to you in the doctor’s office.  Wear clean comfortable clothes.  Do not wear contact lenses, false eyelashes or make-up.  Bring a case for your glasses.   Bring crutches or walker if applicable.  Remove all piercings.  Leave jewelry and any other valuables at home.  Hair extensions with metal clips must be removed prior to surgery.  The Pre-Admission Testing nurse will instruct you to bring medications if unable to obtain an accurate list in Pre-Admission Testing.    Day of surgery you will need to let the preoperative nurse know the last time you  took each of your medications.  To ensure a safe environment for patients and staff, we kindly ask that children under the age of 16 not accompany patients.  If you must bring a dependent child or dependent adult please ensure a responsible adult, other than yourself, is present to supervise them.      If you were given a blood bank ID arm band remember to bring it with you the day of surgery.    Preventing a Surgical Site Infection:  For 2 to 3 days before surgery, avoid shaving with a razor because the razor can irritate skin and make it easier to develop an infection.    Any areas of open skin can increase the risk of a post-operative wound infection by allowing bacteria to enter and travel throughout the body.  Notify your surgeon if you have any skin wounds / rashes even if it is not near the expected surgical site.  The area will need assessed to determine if surgery should be delayed until it is healed.  The night prior to surgery shower using a fresh bar of anti-bacterial soap (such as Dial) and clean washcloth.  Sleep in a clean bed with clean clothing.  Do not allow pets to sleep with you.  Shower on the morning of surgery using a fresh bar of anti-bacterial soap (such as Dial) and clean washcloth.  Dry with a clean towel and dress in clean clothing.  Ask your surgeon if you will be receiving antibiotics prior to surgery.  Make sure you, your family, and all healthcare providers clean their hands with soap and water or an alcohol based hand  before caring for you or your wound.    Day of surgery:  Your arrival time is approximately two hours before your scheduled surgery time.  Please note if you have an early arrival time the surgery doors do not open before 5:00 AM.  Upon arrival, a Pre-op nurse and Anesthesiologist will review your health history, obtain vital signs, and answer questions you may have.  The only belongings needed at this time will be a list of your home medications and if  applicable your C-PAP/BI-PAP machine.  A Pre-op nurse will start an IV and you may receive medication in preparation for surgery, including something to help you relax.     Please be aware that surgery does come with discomfort.  We want to make every effort to control your discomfort so please discuss any uncontrolled symptoms with your nurse.   Your doctor will most likely have prescribed pain medications.      If you are going home after surgery you will receive individualized written care instructions before being discharged.  A responsible adult must drive you to and from the hospital on the day of your surgery and ideally stay with you through the night.   .  Discharge prescriptions can be filled by the hospital pharmacy during regular pharmacy hours.  If you are having surgery late in the day/evening your prescription may be e-prescribed to your pharmacy.  Please verify your pharmacy hours or chose a 24 hour pharmacy to avoid not having access to your prescription because your pharmacy has closed for the day.    If you are staying overnight following surgery, you will be transported to your hospital room following the recovery period.  [facility] has all private rooms.    If you have any questions please call Pre-Admission Testing at [phone].  Deductibles and co-payments are collected on the day of service. Please be prepared to pay the required co-pay, deductible or deposit on the day of service as defined by your plan.    Call your surgeon immediately if you experience any of the following symptoms:  Sore Throat  Shortness of Breath or difficulty breathing  Cough  Chills  Body soreness or muscle pain  Headache  Fever  New loss of taste or smell  Do not arrive for your surgery ill.  Your procedure will need to be rescheduled to another time.  You will need to call your physician before the day of surgery to avoid any unnecessary exposure to hospital staff as well as other patients.           CHLORHEXIDINE CLOTH INSTRUCTIONS  The morning of surgery follow these instructions using the Chlorhexidine cloths you've been given.  These steps reduce bacteria on the body.  Do not use the cloths near your eyes, ears mouth, genitalia or on open wounds.  Throw the cloths away after use but do not try to flush them down a toilet.      Open and remove one cloth at a time from the package.    Leave the cloth unfolded and begin the bathing.  Massage the skin with the cloths using gentle pressure to remove bacteria.  Do not scrub harshly.   Follow the steps below with one 2% CHG cloth per area (6 total cloths).  One cloth for neck, shoulders and chest.  One cloth for both arms, hands, fingers and underarms (do underarms last).  One cloth for the abdomen followed by groin.  One cloth for right leg and foot including between the toes.  One cloth for left leg and foot including between the toes.  The last cloth is to be used for the back of the neck, back and buttocks.    Allow the CHG to air dry 3 minutes on the skin which will give it time to work and decrease the chance of irritation.  The skin may feel sticky until it is dry.  Do not rinse with water or any other liquid or you will lose the beneficial effects of the CHG.  If mild skin irritation occurs, do rinse the skin to remove the CHG.  Report this to the nurse at time of admission.  Do not apply lotions, creams, ointments, deodorants or perfumes after using the clothes. Dress in clean clothes before coming to the hospital.

## 2025-03-17 ENCOUNTER — APPOINTMENT (OUTPATIENT)
Dept: GENERAL RADIOLOGY | Facility: HOSPITAL | Age: 85
DRG: 448 | End: 2025-03-17
Payer: MEDICARE

## 2025-03-17 ENCOUNTER — HOSPITAL ENCOUNTER (INPATIENT)
Facility: HOSPITAL | Age: 85
LOS: 6 days | Discharge: NURSING, REHAB OR LONG-TERM CARE | DRG: 448 | End: 2025-03-23
Attending: NEUROLOGICAL SURGERY | Admitting: NEUROLOGICAL SURGERY
Payer: MEDICARE

## 2025-03-17 ENCOUNTER — ANESTHESIA (OUTPATIENT)
Dept: PERIOP | Facility: HOSPITAL | Age: 85
End: 2025-03-17
Payer: MEDICARE

## 2025-03-17 ENCOUNTER — ANESTHESIA EVENT (OUTPATIENT)
Dept: PERIOP | Facility: HOSPITAL | Age: 85
End: 2025-03-17
Payer: MEDICARE

## 2025-03-17 DIAGNOSIS — M48.062 SPINAL STENOSIS OF LUMBAR REGION WITH NEUROGENIC CLAUDICATION: ICD-10-CM

## 2025-03-17 PROCEDURE — 25010000002 HEPARIN (PORCINE) PER 1000 UNITS: Performed by: NEUROLOGICAL SURGERY

## 2025-03-17 PROCEDURE — C1713 ANCHOR/SCREW BN/BN,TIS/BN: HCPCS | Performed by: NEUROLOGICAL SURGERY

## 2025-03-17 PROCEDURE — 25010000002 CEFAZOLIN PER 500 MG: Performed by: NURSE ANESTHETIST, CERTIFIED REGISTERED

## 2025-03-17 PROCEDURE — 25010000002 MORPHINE PER 10 MG: Performed by: NEUROLOGICAL SURGERY

## 2025-03-17 PROCEDURE — 25010000002 FENTANYL CITRATE (PF) 50 MCG/ML SOLUTION: Performed by: NURSE ANESTHETIST, CERTIFIED REGISTERED

## 2025-03-17 PROCEDURE — 25010000002 PROPOFOL 10 MG/ML EMULSION: Performed by: NURSE ANESTHETIST, CERTIFIED REGISTERED

## 2025-03-17 PROCEDURE — 22630 ARTHRD PST TQ 1NTRSPC LUM: CPT | Performed by: NEUROLOGICAL SURGERY

## 2025-03-17 PROCEDURE — 25010000002 LIDOCAINE 2% SOLUTION: Performed by: NURSE ANESTHETIST, CERTIFIED REGISTERED

## 2025-03-17 PROCEDURE — 25810000003 LACTATED RINGERS PER 1000 ML: Performed by: ANESTHESIOLOGY

## 2025-03-17 PROCEDURE — 25010000002 ONDANSETRON PER 1 MG: Performed by: NURSE ANESTHETIST, CERTIFIED REGISTERED

## 2025-03-17 PROCEDURE — 0SG10AJ FUSION OF 2 OR MORE LUMBAR VERTEBRAL JOINTS WITH INTERBODY FUSION DEVICE, POSTERIOR APPROACH, ANTERIOR COLUMN, OPEN APPROACH: ICD-10-PCS | Performed by: NEUROLOGICAL SURGERY

## 2025-03-17 PROCEDURE — 25810000003 SODIUM CHLORIDE 0.9 % SOLUTION 250 ML FLEX CONT: Performed by: NURSE ANESTHETIST, CERTIFIED REGISTERED

## 2025-03-17 PROCEDURE — 20939 BONE MARROW ASPIR BONE GRFG: CPT | Performed by: NEUROLOGICAL SURGERY

## 2025-03-17 PROCEDURE — 25010000002 ALBUMIN HUMAN 5% PER 50 ML: Performed by: NURSE ANESTHETIST, CERTIFIED REGISTERED

## 2025-03-17 PROCEDURE — 25010000002 SUGAMMADEX 200 MG/2ML SOLUTION: Performed by: NURSE ANESTHETIST, CERTIFIED REGISTERED

## 2025-03-17 PROCEDURE — 63053 LAM FACTC/FRMT ARTHRD LUM EA: CPT | Performed by: NEUROLOGICAL SURGERY

## 2025-03-17 PROCEDURE — 25010000002 MAGNESIUM SULFATE PER 500 MG OF MAGNESIUM: Performed by: NURSE ANESTHETIST, CERTIFIED REGISTERED

## 2025-03-17 PROCEDURE — 25010000002 METHOCARBAMOL 1000 MG/10ML SOLUTION: Performed by: NURSE PRACTITIONER

## 2025-03-17 PROCEDURE — 25010000002 SODIUM CHLORIDE 0.9 % WITH KCL 20 MEQ 20-0.9 MEQ/L-% SOLUTION: Performed by: NEUROLOGICAL SURGERY

## 2025-03-17 PROCEDURE — 22842 INSERT SPINE FIXATION DEVICE: CPT | Performed by: NEUROLOGICAL SURGERY

## 2025-03-17 PROCEDURE — 25010000002 CEFAZOLIN PER 500 MG: Performed by: NEUROLOGICAL SURGERY

## 2025-03-17 PROCEDURE — 25810000003 SODIUM CHLORIDE PER 500 ML: Performed by: NEUROLOGICAL SURGERY

## 2025-03-17 PROCEDURE — 76000 FLUOROSCOPY <1 HR PHYS/QHP: CPT

## 2025-03-17 PROCEDURE — 25010000002 DIPHENHYDRAMINE PER 50 MG: Performed by: NURSE ANESTHETIST, CERTIFIED REGISTERED

## 2025-03-17 PROCEDURE — 25010000002 PHENYLEPHRINE 10 MG/ML SOLUTION 5 ML VIAL: Performed by: NURSE ANESTHETIST, CERTIFIED REGISTERED

## 2025-03-17 PROCEDURE — 63052 LAM FACETC/FRMT ARTHRD LUM 1: CPT | Performed by: NEUROLOGICAL SURGERY

## 2025-03-17 PROCEDURE — 22632 ARTHRD PST TQ 1NTRSPC LM EA: CPT | Performed by: NEUROLOGICAL SURGERY

## 2025-03-17 PROCEDURE — 25010000002 DEXAMETHASONE SODIUM PHOSPHATE 20 MG/5ML SOLUTION: Performed by: NURSE ANESTHETIST, CERTIFIED REGISTERED

## 2025-03-17 PROCEDURE — 25010000002 VANCOMYCIN 1 G RECONSTITUTED SOLUTION 1 EACH VIAL: Performed by: NEUROLOGICAL SURGERY

## 2025-03-17 PROCEDURE — P9041 ALBUMIN (HUMAN),5%, 50ML: HCPCS | Performed by: NURSE ANESTHETIST, CERTIFIED REGISTERED

## 2025-03-17 PROCEDURE — 22853 INSJ BIOMECHANICAL DEVICE: CPT | Performed by: NEUROLOGICAL SURGERY

## 2025-03-17 PROCEDURE — 25810000003 SODIUM CHLORIDE 0.9 % SOLUTION

## 2025-03-17 DEVICE — ROD 1475501070 4.75 CCM NS CURV 70MM
Type: IMPLANTABLE DEVICE | Site: SPINE LUMBAR | Status: FUNCTIONAL
Brand: CD HORIZON® SPINAL SYSTEM

## 2025-03-17 DEVICE — SPACER 6068076 CATALYFT PL LONG 7MM
Type: IMPLANTABLE DEVICE | Site: SPINE LUMBAR | Status: FUNCTIONAL
Brand: CATALYFT PL EXPANDABLE INTERBODY SYSTEM

## 2025-03-17 DEVICE — ALLOGRFT DBM GRAFTON DS INJ FIBR 9CC: Type: IMPLANTABLE DEVICE | Site: SPINE LUMBAR | Status: FUNCTIONAL

## 2025-03-17 DEVICE — FLOSEAL WITH RECOTHROM - 5ML
Type: IMPLANTABLE DEVICE | Site: SPINE LUMBAR | Status: FUNCTIONAL
Brand: FLOSEAL HEMOSTATIC MATRIX

## 2025-03-17 DEVICE — SSC BONE WAX
Type: IMPLANTABLE DEVICE | Site: SPINE LUMBAR | Status: FUNCTIONAL
Brand: SSC BONE WAX

## 2025-03-17 DEVICE — HEMOST ABS SURGIFOAM SZ100 8X12 10MM: Type: IMPLANTABLE DEVICE | Site: SPINE LUMBAR | Status: FUNCTIONAL

## 2025-03-17 RX ORDER — KETAMINE HCL IN NACL, ISO-OSM 100MG/10ML
10 SYRINGE (ML) INJECTION
Status: DISCONTINUED | OUTPATIENT
Start: 2025-03-17 | End: 2025-03-17 | Stop reason: HOSPADM

## 2025-03-17 RX ORDER — SODIUM CHLORIDE 0.9 % (FLUSH) 0.9 %
3 SYRINGE (ML) INJECTION EVERY 12 HOURS SCHEDULED
Status: DISCONTINUED | OUTPATIENT
Start: 2025-03-17 | End: 2025-03-17 | Stop reason: HOSPADM

## 2025-03-17 RX ORDER — NALOXONE HCL 0.4 MG/ML
0.4 VIAL (ML) INJECTION
Status: DISCONTINUED | OUTPATIENT
Start: 2025-03-17 | End: 2025-03-24 | Stop reason: HOSPADM

## 2025-03-17 RX ORDER — FENTANYL CITRATE 50 UG/ML
25 INJECTION, SOLUTION INTRAMUSCULAR; INTRAVENOUS
Status: DISCONTINUED | OUTPATIENT
Start: 2025-03-17 | End: 2025-03-17 | Stop reason: HOSPADM

## 2025-03-17 RX ORDER — PROPOFOL 10 MG/ML
VIAL (ML) INTRAVENOUS AS NEEDED
Status: DISCONTINUED | OUTPATIENT
Start: 2025-03-17 | End: 2025-03-17 | Stop reason: SURG

## 2025-03-17 RX ORDER — MIDAZOLAM HYDROCHLORIDE 1 MG/ML
0.5 INJECTION, SOLUTION INTRAMUSCULAR; INTRAVENOUS
Status: DISCONTINUED | OUTPATIENT
Start: 2025-03-17 | End: 2025-03-17 | Stop reason: HOSPADM

## 2025-03-17 RX ORDER — HYDRALAZINE HYDROCHLORIDE 20 MG/ML
5 INJECTION INTRAMUSCULAR; INTRAVENOUS
Status: DISCONTINUED | OUTPATIENT
Start: 2025-03-17 | End: 2025-03-17 | Stop reason: HOSPADM

## 2025-03-17 RX ORDER — SODIUM CHLORIDE AND POTASSIUM CHLORIDE 150; 900 MG/100ML; MG/100ML
100 INJECTION, SOLUTION INTRAVENOUS CONTINUOUS
Status: DISPENSED | OUTPATIENT
Start: 2025-03-17 | End: 2025-03-18

## 2025-03-17 RX ORDER — SODIUM CHLORIDE 0.9 % (FLUSH) 0.9 %
3-10 SYRINGE (ML) INJECTION AS NEEDED
Status: DISCONTINUED | OUTPATIENT
Start: 2025-03-17 | End: 2025-03-17 | Stop reason: HOSPADM

## 2025-03-17 RX ORDER — DIPHENHYDRAMINE HYDROCHLORIDE 50 MG/ML
12.5 INJECTION, SOLUTION INTRAMUSCULAR; INTRAVENOUS
Status: DISCONTINUED | OUTPATIENT
Start: 2025-03-17 | End: 2025-03-17 | Stop reason: HOSPADM

## 2025-03-17 RX ORDER — CEFAZOLIN SODIUM 500 MG/2.2ML
INJECTION, POWDER, FOR SOLUTION INTRAMUSCULAR; INTRAVENOUS AS NEEDED
Status: DISCONTINUED | OUTPATIENT
Start: 2025-03-17 | End: 2025-03-17 | Stop reason: SURG

## 2025-03-17 RX ORDER — SODIUM CHLORIDE 9 MG/ML
40 INJECTION, SOLUTION INTRAVENOUS AS NEEDED
Status: DISCONTINUED | OUTPATIENT
Start: 2025-03-17 | End: 2025-03-24 | Stop reason: HOSPADM

## 2025-03-17 RX ORDER — PROMETHAZINE HYDROCHLORIDE 25 MG/1
25 TABLET ORAL ONCE AS NEEDED
Status: DISCONTINUED | OUTPATIENT
Start: 2025-03-17 | End: 2025-03-17 | Stop reason: HOSPADM

## 2025-03-17 RX ORDER — LIDOCAINE HYDROCHLORIDE 10 MG/ML
0.5 INJECTION, SOLUTION INFILTRATION; PERINEURAL ONCE AS NEEDED
Status: DISCONTINUED | OUTPATIENT
Start: 2025-03-17 | End: 2025-03-17 | Stop reason: HOSPADM

## 2025-03-17 RX ORDER — ACETAMINOPHEN 650 MG/1
650 SUPPOSITORY RECTAL EVERY 4 HOURS PRN
Status: DISCONTINUED | OUTPATIENT
Start: 2025-03-17 | End: 2025-03-24 | Stop reason: HOSPADM

## 2025-03-17 RX ORDER — ROCURONIUM BROMIDE 10 MG/ML
INJECTION, SOLUTION INTRAVENOUS AS NEEDED
Status: DISCONTINUED | OUTPATIENT
Start: 2025-03-17 | End: 2025-03-17 | Stop reason: SURG

## 2025-03-17 RX ORDER — MAGNESIUM SULFATE HEPTAHYDRATE 500 MG/ML
INJECTION, SOLUTION INTRAMUSCULAR; INTRAVENOUS AS NEEDED
Status: DISCONTINUED | OUTPATIENT
Start: 2025-03-17 | End: 2025-03-17 | Stop reason: SURG

## 2025-03-17 RX ORDER — SODIUM CHLORIDE, SODIUM LACTATE, POTASSIUM CHLORIDE, CALCIUM CHLORIDE 600; 310; 30; 20 MG/100ML; MG/100ML; MG/100ML; MG/100ML
9 INJECTION, SOLUTION INTRAVENOUS CONTINUOUS
Status: ACTIVE | OUTPATIENT
Start: 2025-03-17 | End: 2025-03-18

## 2025-03-17 RX ORDER — EPHEDRINE SULFATE 50 MG/ML
5 INJECTION, SOLUTION INTRAVENOUS ONCE AS NEEDED
Status: DISCONTINUED | OUTPATIENT
Start: 2025-03-17 | End: 2025-03-17 | Stop reason: HOSPADM

## 2025-03-17 RX ORDER — FENTANYL CITRATE 50 UG/ML
INJECTION, SOLUTION INTRAMUSCULAR; INTRAVENOUS AS NEEDED
Status: DISCONTINUED | OUTPATIENT
Start: 2025-03-17 | End: 2025-03-17 | Stop reason: SURG

## 2025-03-17 RX ORDER — ACETAMINOPHEN 325 MG/1
650 TABLET ORAL EVERY 4 HOURS PRN
Status: DISCONTINUED | OUTPATIENT
Start: 2025-03-17 | End: 2025-03-24 | Stop reason: HOSPADM

## 2025-03-17 RX ORDER — PREGABALIN 100 MG/1
100 CAPSULE ORAL 2 TIMES DAILY
Status: DISCONTINUED | OUTPATIENT
Start: 2025-03-17 | End: 2025-03-24 | Stop reason: HOSPADM

## 2025-03-17 RX ORDER — ONDANSETRON 4 MG/1
4 TABLET, ORALLY DISINTEGRATING ORAL EVERY 6 HOURS PRN
Status: DISCONTINUED | OUTPATIENT
Start: 2025-03-17 | End: 2025-03-24 | Stop reason: HOSPADM

## 2025-03-17 RX ORDER — NALOXONE HCL 0.4 MG/ML
0.2 VIAL (ML) INJECTION AS NEEDED
Status: DISCONTINUED | OUTPATIENT
Start: 2025-03-17 | End: 2025-03-17 | Stop reason: HOSPADM

## 2025-03-17 RX ORDER — LABETALOL HYDROCHLORIDE 5 MG/ML
5 INJECTION, SOLUTION INTRAVENOUS
Status: DISCONTINUED | OUTPATIENT
Start: 2025-03-17 | End: 2025-03-17 | Stop reason: HOSPADM

## 2025-03-17 RX ORDER — DEXMEDETOMIDINE HYDROCHLORIDE 4 UG/ML
INJECTION, SOLUTION INTRAVENOUS AS NEEDED
Status: DISCONTINUED | OUTPATIENT
Start: 2025-03-17 | End: 2025-03-17 | Stop reason: SURG

## 2025-03-17 RX ORDER — MORPHINE SULFATE 2 MG/ML
1 INJECTION, SOLUTION INTRAMUSCULAR; INTRAVENOUS
Status: DISCONTINUED | OUTPATIENT
Start: 2025-03-17 | End: 2025-03-20

## 2025-03-17 RX ORDER — SPIRONOLACTONE 25 MG/1
50 TABLET ORAL DAILY
Status: DISCONTINUED | OUTPATIENT
Start: 2025-03-17 | End: 2025-03-24 | Stop reason: HOSPADM

## 2025-03-17 RX ORDER — HYDROCODONE BITARTRATE AND ACETAMINOPHEN 5; 325 MG/1; MG/1
1 TABLET ORAL EVERY 4 HOURS PRN
Status: DISPENSED | OUTPATIENT
Start: 2025-03-17 | End: 2025-03-22

## 2025-03-17 RX ORDER — DROPERIDOL 2.5 MG/ML
0.62 INJECTION, SOLUTION INTRAMUSCULAR; INTRAVENOUS
Status: DISCONTINUED | OUTPATIENT
Start: 2025-03-17 | End: 2025-03-17 | Stop reason: HOSPADM

## 2025-03-17 RX ORDER — METHOCARBAMOL 100 MG/ML
1000 INJECTION, SOLUTION INTRAMUSCULAR; INTRAVENOUS ONCE
Status: COMPLETED | OUTPATIENT
Start: 2025-03-17 | End: 2025-03-17

## 2025-03-17 RX ORDER — ONDANSETRON 2 MG/ML
INJECTION INTRAMUSCULAR; INTRAVENOUS AS NEEDED
Status: DISCONTINUED | OUTPATIENT
Start: 2025-03-17 | End: 2025-03-17 | Stop reason: SURG

## 2025-03-17 RX ORDER — BISACODYL 10 MG
10 SUPPOSITORY, RECTAL RECTAL DAILY PRN
Status: DISCONTINUED | OUTPATIENT
Start: 2025-03-17 | End: 2025-03-24 | Stop reason: HOSPADM

## 2025-03-17 RX ORDER — HYDRALAZINE HYDROCHLORIDE 25 MG/1
25 TABLET, FILM COATED ORAL 2 TIMES DAILY
Status: DISCONTINUED | OUTPATIENT
Start: 2025-03-17 | End: 2025-03-24 | Stop reason: HOSPADM

## 2025-03-17 RX ORDER — PHENYLEPHRINE HCL IN 0.9% NACL 1 MG/10 ML
SYRINGE (ML) INTRAVENOUS AS NEEDED
Status: DISCONTINUED | OUTPATIENT
Start: 2025-03-17 | End: 2025-03-17 | Stop reason: SURG

## 2025-03-17 RX ORDER — HYDROCHLOROTHIAZIDE 12.5 MG/1
12.5 TABLET ORAL DAILY
Status: DISCONTINUED | OUTPATIENT
Start: 2025-03-17 | End: 2025-03-24 | Stop reason: HOSPADM

## 2025-03-17 RX ORDER — HYDROCODONE BITARTRATE AND ACETAMINOPHEN 5; 325 MG/1; MG/1
1 TABLET ORAL ONCE AS NEEDED
Status: DISCONTINUED | OUTPATIENT
Start: 2025-03-17 | End: 2025-03-17 | Stop reason: HOSPADM

## 2025-03-17 RX ORDER — FAMOTIDINE 10 MG/ML
20 INJECTION, SOLUTION INTRAVENOUS ONCE
Status: COMPLETED | OUTPATIENT
Start: 2025-03-17 | End: 2025-03-17

## 2025-03-17 RX ORDER — METOPROLOL SUCCINATE 25 MG/1
25 TABLET, EXTENDED RELEASE ORAL NIGHTLY
Status: DISCONTINUED | OUTPATIENT
Start: 2025-03-17 | End: 2025-03-24 | Stop reason: HOSPADM

## 2025-03-17 RX ORDER — HYDRALAZINE HYDROCHLORIDE 25 MG/1
25 TABLET, FILM COATED ORAL 2 TIMES DAILY
Qty: 180 TABLET | Refills: 1 | Status: SHIPPED | OUTPATIENT
Start: 2025-03-17

## 2025-03-17 RX ORDER — LIDOCAINE HYDROCHLORIDE 20 MG/ML
INJECTION, SOLUTION INFILTRATION; PERINEURAL AS NEEDED
Status: DISCONTINUED | OUTPATIENT
Start: 2025-03-17 | End: 2025-03-17 | Stop reason: SURG

## 2025-03-17 RX ORDER — POLYETHYLENE GLYCOL 3350 17 G/17G
17 POWDER, FOR SOLUTION ORAL DAILY PRN
Status: DISCONTINUED | OUTPATIENT
Start: 2025-03-17 | End: 2025-03-24 | Stop reason: HOSPADM

## 2025-03-17 RX ORDER — IPRATROPIUM BROMIDE AND ALBUTEROL SULFATE 2.5; .5 MG/3ML; MG/3ML
3 SOLUTION RESPIRATORY (INHALATION) ONCE AS NEEDED
Status: DISCONTINUED | OUTPATIENT
Start: 2025-03-17 | End: 2025-03-17 | Stop reason: HOSPADM

## 2025-03-17 RX ORDER — FENTANYL CITRATE 50 UG/ML
50 INJECTION, SOLUTION INTRAMUSCULAR; INTRAVENOUS ONCE AS NEEDED
Status: DISCONTINUED | OUTPATIENT
Start: 2025-03-17 | End: 2025-03-17 | Stop reason: HOSPADM

## 2025-03-17 RX ORDER — HYDROCODONE BITARTRATE AND ACETAMINOPHEN 7.5; 325 MG/1; MG/1
1 TABLET ORAL EVERY 4 HOURS PRN
Status: DISCONTINUED | OUTPATIENT
Start: 2025-03-17 | End: 2025-03-17 | Stop reason: HOSPADM

## 2025-03-17 RX ORDER — METHOCARBAMOL 500 MG/1
500 TABLET, FILM COATED ORAL EVERY 8 HOURS SCHEDULED
Status: DISCONTINUED | OUTPATIENT
Start: 2025-03-17 | End: 2025-03-24 | Stop reason: HOSPADM

## 2025-03-17 RX ORDER — TRANEXAMIC ACID 100 MG/ML
INJECTION, SOLUTION INTRAVENOUS AS NEEDED
Status: DISCONTINUED | OUTPATIENT
Start: 2025-03-17 | End: 2025-03-17 | Stop reason: SURG

## 2025-03-17 RX ORDER — SODIUM CHLORIDE 9 MG/ML
INJECTION, SOLUTION INTRAVENOUS AS NEEDED
Status: DISCONTINUED | OUTPATIENT
Start: 2025-03-17 | End: 2025-03-17 | Stop reason: HOSPADM

## 2025-03-17 RX ORDER — ALBUMIN HUMAN 50 G/1000ML
SOLUTION INTRAVENOUS CONTINUOUS PRN
Status: DISCONTINUED | OUTPATIENT
Start: 2025-03-17 | End: 2025-03-17 | Stop reason: SURG

## 2025-03-17 RX ORDER — PROMETHAZINE HYDROCHLORIDE 25 MG/1
25 SUPPOSITORY RECTAL ONCE AS NEEDED
Status: DISCONTINUED | OUTPATIENT
Start: 2025-03-17 | End: 2025-03-17 | Stop reason: HOSPADM

## 2025-03-17 RX ORDER — BISACODYL 5 MG/1
5 TABLET, DELAYED RELEASE ORAL DAILY PRN
Status: DISCONTINUED | OUTPATIENT
Start: 2025-03-17 | End: 2025-03-24 | Stop reason: HOSPADM

## 2025-03-17 RX ORDER — ATROPINE SULFATE 0.4 MG/ML
0.4 INJECTION, SOLUTION INTRAMUSCULAR; INTRAVENOUS; SUBCUTANEOUS ONCE AS NEEDED
Status: DISCONTINUED | OUTPATIENT
Start: 2025-03-17 | End: 2025-03-17 | Stop reason: HOSPADM

## 2025-03-17 RX ORDER — ONDANSETRON 2 MG/ML
4 INJECTION INTRAMUSCULAR; INTRAVENOUS ONCE AS NEEDED
Status: DISCONTINUED | OUTPATIENT
Start: 2025-03-17 | End: 2025-03-17 | Stop reason: HOSPADM

## 2025-03-17 RX ORDER — AMOXICILLIN 250 MG
2 CAPSULE ORAL 2 TIMES DAILY PRN
Status: DISCONTINUED | OUTPATIENT
Start: 2025-03-17 | End: 2025-03-24 | Stop reason: HOSPADM

## 2025-03-17 RX ORDER — ACETAMINOPHEN 160 MG/5ML
650 SOLUTION ORAL EVERY 4 HOURS PRN
Status: DISCONTINUED | OUTPATIENT
Start: 2025-03-17 | End: 2025-03-24 | Stop reason: HOSPADM

## 2025-03-17 RX ORDER — SODIUM CHLORIDE 0.9 % (FLUSH) 0.9 %
10 SYRINGE (ML) INJECTION AS NEEDED
Status: DISCONTINUED | OUTPATIENT
Start: 2025-03-17 | End: 2025-03-24 | Stop reason: HOSPADM

## 2025-03-17 RX ORDER — DEXAMETHASONE SODIUM PHOSPHATE 4 MG/ML
INJECTION, SOLUTION INTRA-ARTICULAR; INTRALESIONAL; INTRAMUSCULAR; INTRAVENOUS; SOFT TISSUE AS NEEDED
Status: DISCONTINUED | OUTPATIENT
Start: 2025-03-17 | End: 2025-03-17 | Stop reason: SURG

## 2025-03-17 RX ORDER — ATORVASTATIN CALCIUM 20 MG/1
10 TABLET, FILM COATED ORAL NIGHTLY
Status: DISCONTINUED | OUTPATIENT
Start: 2025-03-17 | End: 2025-03-24 | Stop reason: HOSPADM

## 2025-03-17 RX ORDER — ONDANSETRON 2 MG/ML
4 INJECTION INTRAMUSCULAR; INTRAVENOUS EVERY 6 HOURS PRN
Status: DISCONTINUED | OUTPATIENT
Start: 2025-03-17 | End: 2025-03-24 | Stop reason: HOSPADM

## 2025-03-17 RX ORDER — SODIUM CHLORIDE 0.9 % (FLUSH) 0.9 %
3 SYRINGE (ML) INJECTION EVERY 12 HOURS SCHEDULED
Status: DISCONTINUED | OUTPATIENT
Start: 2025-03-17 | End: 2025-03-24 | Stop reason: HOSPADM

## 2025-03-17 RX ORDER — ACETAMINOPHEN 500 MG
500 TABLET ORAL DAILY
Status: DISCONTINUED | OUTPATIENT
Start: 2025-03-17 | End: 2025-03-24 | Stop reason: HOSPADM

## 2025-03-17 RX ORDER — FLUMAZENIL 0.1 MG/ML
0.2 INJECTION INTRAVENOUS AS NEEDED
Status: DISCONTINUED | OUTPATIENT
Start: 2025-03-17 | End: 2025-03-17 | Stop reason: HOSPADM

## 2025-03-17 RX ADMIN — ONDANSETRON 4 MG: 2 INJECTION, SOLUTION INTRAMUSCULAR; INTRAVENOUS at 13:43

## 2025-03-17 RX ADMIN — HYDROCODONE BITARTRATE AND ACETAMINOPHEN 1 TABLET: 5; 325 TABLET ORAL at 18:52

## 2025-03-17 RX ADMIN — ROCURONIUM BROMIDE 20 MG: 10 INJECTION, SOLUTION INTRAVENOUS at 10:15

## 2025-03-17 RX ADMIN — Medication 3 ML: at 20:39

## 2025-03-17 RX ADMIN — FENTANYL CITRATE 25 MCG: 50 INJECTION, SOLUTION INTRAMUSCULAR; INTRAVENOUS at 10:59

## 2025-03-17 RX ADMIN — POLYETHYLENE GLYCOL 3350 17 G: 17 POWDER, FOR SOLUTION ORAL at 18:56

## 2025-03-17 RX ADMIN — CEFAZOLIN 2 G: 2 INJECTION, POWDER, FOR SOLUTION INTRAMUSCULAR; INTRAVENOUS at 09:22

## 2025-03-17 RX ADMIN — FENTANYL CITRATE 50 MCG: 50 INJECTION, SOLUTION INTRAMUSCULAR; INTRAVENOUS at 14:10

## 2025-03-17 RX ADMIN — Medication 10 MG: at 16:17

## 2025-03-17 RX ADMIN — PROPOFOL 25 MG: 10 INJECTION, EMULSION INTRAVENOUS at 11:57

## 2025-03-17 RX ADMIN — METHOCARBAMOL TABLETS 500 MG: 500 TABLET, COATED ORAL at 18:51

## 2025-03-17 RX ADMIN — SODIUM CHLORIDE, SODIUM LACTATE, POTASSIUM CHLORIDE, AND CALCIUM CHLORIDE 9 ML/HR: 600; 310; 30; 20 INJECTION, SOLUTION INTRAVENOUS at 09:07

## 2025-03-17 RX ADMIN — Medication 10 MG: at 12:25

## 2025-03-17 RX ADMIN — ATORVASTATIN CALCIUM 10 MG: 20 TABLET, FILM COATED ORAL at 20:00

## 2025-03-17 RX ADMIN — PROPOFOL 150 MG: 10 INJECTION, EMULSION INTRAVENOUS at 09:34

## 2025-03-17 RX ADMIN — Medication 10 MG: at 15:06

## 2025-03-17 RX ADMIN — MAGNESIUM SULFATE HEPTAHYDRATE 1 G: 500 INJECTION, SOLUTION INTRAMUSCULAR; INTRAVENOUS at 11:25

## 2025-03-17 RX ADMIN — ROCURONIUM BROMIDE 20 MG: 10 INJECTION, SOLUTION INTRAVENOUS at 11:07

## 2025-03-17 RX ADMIN — Medication 10 MG: at 15:52

## 2025-03-17 RX ADMIN — TRANEXAMIC ACID 1000 MG: 100 INJECTION, SOLUTION INTRAVENOUS at 11:39

## 2025-03-17 RX ADMIN — FAMOTIDINE 20 MG: 10 INJECTION INTRAVENOUS at 09:07

## 2025-03-17 RX ADMIN — LIDOCAINE HYDROCHLORIDE 100 MG: 20 INJECTION, SOLUTION INFILTRATION; PERINEURAL at 09:34

## 2025-03-17 RX ADMIN — Medication 100 MCG: at 12:19

## 2025-03-17 RX ADMIN — DEXAMETHASONE SODIUM PHOSPHATE 4 MG: 4 INJECTION, SOLUTION INTRAMUSCULAR; INTRAVENOUS at 09:55

## 2025-03-17 RX ADMIN — Medication 100 MCG: at 13:30

## 2025-03-17 RX ADMIN — FENTANYL CITRATE 25 MCG: 50 INJECTION, SOLUTION INTRAMUSCULAR; INTRAVENOUS at 14:30

## 2025-03-17 RX ADMIN — METHOCARBAMOL 1000 MG: 1000 INJECTION, SOLUTION INTRAMUSCULAR; INTRAVENOUS at 16:17

## 2025-03-17 RX ADMIN — ROCURONIUM BROMIDE 20 MG: 10 INJECTION, SOLUTION INTRAVENOUS at 12:40

## 2025-03-17 RX ADMIN — ROCURONIUM BROMIDE 10 MG: 10 INJECTION, SOLUTION INTRAVENOUS at 11:25

## 2025-03-17 RX ADMIN — HYDRALAZINE HYDROCHLORIDE 25 MG: 25 TABLET ORAL at 20:00

## 2025-03-17 RX ADMIN — FENTANYL CITRATE 25 MCG: 50 INJECTION, SOLUTION INTRAMUSCULAR; INTRAVENOUS at 14:24

## 2025-03-17 RX ADMIN — DEXMEDETOMIDINE HYDROCHLORIDE 4 MCG: 4 INJECTION, SOLUTION INTRAVENOUS at 11:13

## 2025-03-17 RX ADMIN — FENTANYL CITRATE 50 MCG: 50 INJECTION, SOLUTION INTRAMUSCULAR; INTRAVENOUS at 12:45

## 2025-03-17 RX ADMIN — DEXMEDETOMIDINE HYDROCHLORIDE 4 MCG: 4 INJECTION, SOLUTION INTRAVENOUS at 12:11

## 2025-03-17 RX ADMIN — SPIRONOLACTONE 50 MG: 25 TABLET, FILM COATED ORAL at 18:51

## 2025-03-17 RX ADMIN — DEXMEDETOMIDINE HYDROCHLORIDE 4 MCG: 4 INJECTION, SOLUTION INTRAVENOUS at 14:14

## 2025-03-17 RX ADMIN — CEFAZOLIN 2 G: 225 INJECTION, POWDER, FOR SOLUTION INTRAMUSCULAR; INTRAVENOUS at 13:22

## 2025-03-17 RX ADMIN — HYDROCHLOROTHIAZIDE 12.5 MG: 12.5 TABLET ORAL at 18:52

## 2025-03-17 RX ADMIN — Medication 10 MG: at 14:23

## 2025-03-17 RX ADMIN — Medication 10 MG: at 22:46

## 2025-03-17 RX ADMIN — ROCURONIUM BROMIDE 50 MG: 10 INJECTION, SOLUTION INTRAVENOUS at 09:34

## 2025-03-17 RX ADMIN — SODIUM CHLORIDE, SODIUM LACTATE, POTASSIUM CHLORIDE, AND CALCIUM CHLORIDE: 600; 310; 30; 20 INJECTION, SOLUTION INTRAVENOUS at 11:40

## 2025-03-17 RX ADMIN — FENTANYL CITRATE 25 MCG: 50 INJECTION, SOLUTION INTRAMUSCULAR; INTRAVENOUS at 14:48

## 2025-03-17 RX ADMIN — DIPHENHYDRAMINE HYDROCHLORIDE 12.5 MG: 50 INJECTION, SOLUTION INTRAMUSCULAR; INTRAVENOUS at 15:26

## 2025-03-17 RX ADMIN — FENTANYL CITRATE 25 MCG: 50 INJECTION, SOLUTION INTRAMUSCULAR; INTRAVENOUS at 14:32

## 2025-03-17 RX ADMIN — FENTANYL CITRATE 25 MCG: 50 INJECTION, SOLUTION INTRAMUSCULAR; INTRAVENOUS at 14:18

## 2025-03-17 RX ADMIN — PREGABALIN 100 MG: 100 CAPSULE ORAL at 20:00

## 2025-03-17 RX ADMIN — ACETAMINOPHEN 500 MG: 500 TABLET, FILM COATED ORAL at 18:52

## 2025-03-17 RX ADMIN — FENTANYL CITRATE 25 MCG: 50 INJECTION, SOLUTION INTRAMUSCULAR; INTRAVENOUS at 15:24

## 2025-03-17 RX ADMIN — ALBUMIN (HUMAN): 12.5 INJECTION, SOLUTION INTRAVENOUS at 11:43

## 2025-03-17 RX ADMIN — METOPROLOL SUCCINATE 25 MG: 25 TABLET, EXTENDED RELEASE ORAL at 20:00

## 2025-03-17 RX ADMIN — PHENYLEPHRINE HYDROCHLORIDE 0.5 MCG/KG/MIN: 10 INJECTION, SOLUTION INTRAVENOUS at 10:45

## 2025-03-17 RX ADMIN — SUGAMMADEX 300 MG: 100 INJECTION, SOLUTION INTRAVENOUS at 13:56

## 2025-03-17 RX ADMIN — HYDROCODONE BITARTRATE AND ACETAMINOPHEN 1 TABLET: 7.5; 325 TABLET ORAL at 14:37

## 2025-03-17 RX ADMIN — CEFAZOLIN 2000 MG: 2 INJECTION, POWDER, FOR SOLUTION INTRAMUSCULAR; INTRAVENOUS at 18:52

## 2025-03-17 RX ADMIN — FENTANYL CITRATE 25 MCG: 50 INJECTION, SOLUTION INTRAMUSCULAR; INTRAVENOUS at 10:03

## 2025-03-17 RX ADMIN — DEXMEDETOMIDINE HYDROCHLORIDE 4 MCG: 4 INJECTION, SOLUTION INTRAVENOUS at 13:13

## 2025-03-17 RX ADMIN — ROCURONIUM BROMIDE 30 MG: 10 INJECTION, SOLUTION INTRAVENOUS at 13:19

## 2025-03-17 RX ADMIN — POTASSIUM CHLORIDE AND SODIUM CHLORIDE 100 ML/HR: 900; 150 INJECTION, SOLUTION INTRAVENOUS at 18:52

## 2025-03-17 RX ADMIN — ROCURONIUM BROMIDE 20 MG: 10 INJECTION, SOLUTION INTRAVENOUS at 11:55

## 2025-03-17 RX ADMIN — MORPHINE SULFATE 1 MG: 2 INJECTION, SOLUTION INTRAMUSCULAR; INTRAVENOUS at 19:59

## 2025-03-17 RX ADMIN — FENTANYL CITRATE 25 MCG: 50 INJECTION, SOLUTION INTRAMUSCULAR; INTRAVENOUS at 15:40

## 2025-03-17 RX ADMIN — Medication 10 MG: at 14:32

## 2025-03-17 RX ADMIN — DEXMEDETOMIDINE HYDROCHLORIDE 4 MCG: 4 INJECTION, SOLUTION INTRAVENOUS at 09:50

## 2025-03-17 RX ADMIN — FENTANYL CITRATE 25 MCG: 50 INJECTION, SOLUTION INTRAMUSCULAR; INTRAVENOUS at 14:57

## 2025-03-17 RX ADMIN — HYDROCODONE BITARTRATE AND ACETAMINOPHEN 1 TABLET: 5; 325 TABLET ORAL at 22:46

## 2025-03-17 RX ADMIN — MORPHINE SULFATE 1 MG: 2 INJECTION, SOLUTION INTRAMUSCULAR; INTRAVENOUS at 22:05

## 2025-03-17 RX ADMIN — Medication 20 MG: at 11:25

## 2025-03-17 NOTE — ANESTHESIA PROCEDURE NOTES
Airway  Reason: elective    Date/Time: 3/17/2025 9:35 AM  Airway not difficult    General Information and Staff    Patient location during procedure: OR  Anesthesiologist: Izaiah Gupta MD  CRNA/CAA: Srinivasan Anton CRNA    Indications and Patient Condition  Indications for airway management: airway protection    Preoxygenated: yes  MILS maintained throughout    Mask difficulty assessment: 2 - vent by mask + OA or adjuvant +/- NMBA    Final Airway Details    Final airway type: endotracheal airway      Successful airway: ETT  Cuffed: yes   Successful intubation technique: direct laryngoscopy  Endotracheal tube insertion site: oral  Blade: Roque  Blade size: 4  ETT size (mm): 7.5  Cormack-Lehane Classification: grade I - full view of glottis  Placement verified by: chest auscultation and capnometry   Measured from: gums  ETT/EBT to gums (cm): 23  Number of attempts at approach: 1  Assessment: lips, teeth, and gum same as pre-op and atraumatic intubation

## 2025-03-17 NOTE — ANESTHESIA PREPROCEDURE EVALUATION
Anesthesia Evaluation     Patient summary reviewed and Nursing notes reviewed   history of anesthetic complications (difficulty urinating after surgery):   NPO Solid Status: > 8 hours  NPO Liquid Status: > 4 hours           Airway   Mallampati: II  Neck ROM: full  No difficulty expected  Dental    (+) upper dentures    Pulmonary     breath sounds clear to auscultation  Cardiovascular     Rhythm: regular    (+) hypertension, hyperlipidemia      Neuro/Psych  (+) numbness, psychiatric history Depression  GI/Hepatic/Renal/Endo      Musculoskeletal     Abdominal    Substance History      OB/GYN          Other   arthritis,                 Anesthesia Plan    ASA 3     general     (Prone position discussed)  intravenous induction     Anesthetic plan, risks, benefits, and alternatives have been provided, discussed and informed consent has been obtained with: patient.    CODE STATUS:

## 2025-03-17 NOTE — ANESTHESIA POSTPROCEDURE EVALUATION
Patient: Olu Izquierdo    Procedure Summary       Date: 03/17/25 Room / Location: [facility] OR 28 / [facility] MAIN OR; [facility] XRAY    Anesthesia Start: 0927 Anesthesia Stop: 1413    Procedures:       FL C ARM DURING SURGERY      Lumbar 2 to lumbar 3 and lumbar 3 to lumbar 4 laminectomy with neurolysis with fusion and instrumentation (Spine Lumbar) Diagnosis:       Spinal stenosis of lumbar region with neurogenic claudication      (Lumbar)      (Spinal stenosis of lumbar region with neurogenic claudication [M48.062])    Scheduled Providers: Luis Dudley MD Provider: Izaiah Gupta MD    Anesthesia Type: general ASA Status: 3            Anesthesia Type: general    Vitals  Vitals Value Taken Time   /69 03/17/25 16:00   Temp 36.5 °C (97.7 °F) 03/17/25 14:15   Pulse 81 03/17/25 16:15   Resp 12 03/17/25 15:45   SpO2 100 % 03/17/25 16:15   Vitals shown include unfiled device data.        Post Anesthesia Care and Evaluation    Patient location during evaluation: bedside  Patient participation: complete - patient participated  Level of consciousness: sleepy but conscious  Pain score: 0  Pain management: adequate    Airway patency: patent  Anesthetic complications: No anesthetic complications    Cardiovascular status: acceptable  Respiratory status: acceptable  Hydration status: acceptable    Comments: /66 (BP Location: Right arm, Patient Position: Lying)   Pulse 73   Temp 36.5 °C (97.7 °F) (Oral)   Resp 12   SpO2 100%

## 2025-03-17 NOTE — CONSULTS
"    Patient Name:  Olu Izquierdo  YOB: 1940  MRN:  [id number]  Date of Admission:  3/17/2025  Date of Consult:  3/17/2025  Patient Care Team:  Edenilson Henry MD as PCP - General (Family Medicine)    Consults  Evaluate status and make recommendations regarding treatment for hypertension.   Subjective   History of Present Illness  Mr. Izquierdo is a 84 y.o. male that has been admitted to [facility] due to lumbar spinal stenosis with neurogenic claudication.  He has been admitted by the neurosurgery service and and we were asked to see and assist with his medical problems, specifically relating to his hypertension.    Patient is seen postoperatively, he reports he is doing well although still having some back pain which he feels is uncontrolled.  His blood pressures have remained under good control, he is currently on his home blood pressure regimen.      Past Medical History:   Diagnosis Date    Anesthesia complication     HAD DIFFICULTY URINATING AFTER SURGERY    Arthritis     Hyperlipidemia     Hypertension     Injury of back     Back surgery 1984 partial discectomy with \"sciatic nerve damage\"    Low back pain     Neuropathy     BILAT FEET    Numbness     left knee    Spinal stenosis     LUMBAR     Past Surgical History:   Procedure Laterality Date    CATARACT EXTRACTION, BILATERAL      EPIDURAL BLOCK      EYE SURGERY      LUMBAR DISCECTOMY      LUMBAR DISCECTOMY Right 11/08/2019    Procedure: Right Lumbar two-three laminectomy with metrix;  Surgeon: Luis Dudley MD;  Location: [facility];  Service: Neurosurgery     Family History   Problem Relation Age of Onset    Heart disease Mother     Hypertension Mother     Alcohol abuse Father     Malig Hyperthermia Neg Hx      Social History     Tobacco Use    Smoking status: Never    Smokeless tobacco: Never   Vaping Use    Vaping status: Never Used   Substance Use Topics    Alcohol use: Yes     Alcohol/week: 7.0 standard drinks of " alcohol     Types: 7 Shots of liquor per week     Comment: 1 a night     Drug use: No     Medications Prior to Admission   Medication Sig Dispense Refill Last Dose/Taking    atorvastatin (LIPITOR) 10 MG tablet TAKE 1 TABLET BY MOUTH DAILY (Patient taking differently: Take 1 tablet by mouth Every Night.) 90 tablet 1 3/16/2025 at 10:00 PM    diphenhydrAMINE-acetaminophen (TYLENOL PM)  MG tablet per tablet Take 2 tablets by mouth At Night As Needed for Sleep.   3/16/2025 Evening    hydroCHLOROthiazide 12.5 MG tablet TAKE 1 TABLET BY MOUTH DAILY 90 tablet 1 3/16/2025 Morning    Hydrocortisone (PREPARATION H EX) Apply 1 Application topically As Needed.   3/16/2025    metoprolol succinate XL (TOPROL-XL) 25 MG 24 hr tablet TAKE 1 TABLET BY MOUTH DAILY (Patient taking differently: Take 1 tablet by mouth Every Night.) 90 tablet 1 3/16/2025 at 10:00 PM    ramipril (ALTACE) 10 MG capsule TAKE 1 CAPSULE BY MOUTH TWICE DAILY (Patient taking differently: Take 1 capsule by mouth 2 (Two) Times a Day.) 180 capsule 1 3/16/2025 at 10:00 PM    spironolactone (ALDACTONE) 50 MG tablet TAKE 1 TABLET BY MOUTH DAILY 90 tablet 1 3/16/2025 Morning    acetaminophen (TYLENOL) 500 MG tablet Take 1 tablet by mouth Daily. PT STATES HE TAKES 3 TABLETS IN THE AM       hydrALAZINE (APRESOLINE) 25 MG tablet TAKE 1 TABLET BY MOUTH TWICE DAILY 180 tablet 1     HYDROcodone-acetaminophen (NORCO) 7.5-325 MG per tablet Take 1 tablet by mouth Every 6 (Six) Hours As Needed (Spinal stenosis pain prior to surgery). 12 tablet 0 3/13/2025    pregabalin (LYRICA) 100 MG capsule TAKE 1 CAPSULE BY MOUTH TWICE DAILY 60 capsule 0 3/13/2025     Allergies:  Dilaudid [hydromorphone hcl]    Review of Systems   Constitutional:  Negative for fatigue and fever.   Respiratory:  Negative for cough and shortness of breath.    Cardiovascular:  Negative for chest pain and leg swelling.   Gastrointestinal:  Negative for abdominal pain, nausea and vomiting.   Musculoskeletal:   Positive for back pain.   Neurological:  Positive for weakness and numbness.       Objective      Vital Signs  Temp:  [97.7 °F (36.5 °C)-98.4 °F (36.9 °C)] 98.4 °F (36.9 °C)  Heart Rate:  [71-84] 79  Resp:  [12-21] 15  BP: (119-147)/(53-72) 128/53  Body mass index is 26.31 kg/m².    Physical Exam  Constitutional:       General: He is not in acute distress.     Appearance: He is not toxic-appearing.      Comments: Elderly, well nourished   Cardiovascular:      Rate and Rhythm: Normal rate and regular rhythm.   Pulmonary:      Effort: Pulmonary effort is normal. No respiratory distress.      Breath sounds: Normal breath sounds. No wheezing.   Abdominal:      General: Abdomen is flat.      Palpations: Abdomen is soft.      Tenderness: There is no abdominal tenderness.   Musculoskeletal:         General: No tenderness.      Right lower leg: No edema.      Left lower leg: No edema.      Comments: Lumbar dressing in place   Skin:     General: Skin is warm and dry.   Neurological:      General: No focal deficit present.      Mental Status: He is alert and oriented to person, place, and time. Mental status is at baseline.      Motor: No weakness.         Results Review:   I reviewed the patient's new clinical results.  I reviewed the patient's new imaging results and agree with the interpretation.  I reviewed the patient's other test results and agree with the interpretation  I personally viewed and interpreted the patient's EKG/Telemetry data         Assessment/Plan     Active Hospital Problems    Diagnosis  POA    **Spinal stenosis of lumbar region with neurogenic claudication [M48.062]  Yes    Essential hypertension [I10]  Yes    Hypercholesterolemia [E78.00]  Yes       Lumbar spinal stenosis with neurogenic claudication  - Management per primary  - POD0 from L2/3, L3/4 laminectomy with neurolysis, fusion and instrumentation  - pain mgmt per VICKIE- tylenol, norco, robaxin, lyrica  - encourage I-S    Hypertension  - home  regimen- aldactone, HCTZ, metoprolol, hydralazine   - BP well controlled here so far    HLD  - statin    Thank you very much for asking [facility] to be involved in this patient's care. We will follow along with you.      Radha Avila MD  [facility]  03/17/25  23:08 EDT

## 2025-03-17 NOTE — BRIEF OP NOTE
LUMBAR DISCECTOMY POSTERIOR WITH FUSION INSTRUMENTATION  Progress Note    Olu Izquierdo  3/17/2025    Pre-op Diagnosis:   Spinal stenosis of lumbar region with neurogenic claudication [M48.062]       Post-Op Diagnosis Codes:     * Spinal stenosis of lumbar region with neurogenic claudication [M48.062]    Procedure(s):      Procedure(s):  Lumbar 2 to lumbar 3 and lumbar 3 to lumbar 4 laminectomy with neurolysis with fusion and instrumentation              Surgeon(s):  Luis Dudley MD    Anesthesia: General    Staff:   Cell Saver : Delks, Rylee  Circulator: Fatmata Carlton RN; Clarisa Jack RN  Radiology Technologist: Dean Anthony; Kianna Hooker  Scrub Person: Tash Contreras; Nahed Ortega  Vendor Representative: Osvaldo Pulido  Assistant: Nahed Felton CSA  Assistant: Nahed Felton CSA    Estimated Blood Loss: 350 mL    Urine Voided: 275 mL    Specimens:                None      Drains:   Closed/Suction Drain Inferior Back Bulb 10 Fr. (Active)       Urethral Catheter Silicone 16 Fr. (Active)       Findings: Severe stenosis L2-3 and L3-4      Complications: None      Luis Dudley MD     Date: 3/17/2025  Time: 13:56 EDT

## 2025-03-17 NOTE — PLAN OF CARE
Problem: Adult Inpatient Plan of Care  Goal: Plan of Care Review  Outcome: Progressing  Goal: Patient-Specific Goal (Individualized)  Outcome: Progressing  Goal: Absence of Hospital-Acquired Illness or Injury  Outcome: Progressing  Goal: Optimal Comfort and Wellbeing  Outcome: Progressing  Goal: Readiness for Transition of Care  Outcome: Progressing  Intervention: Mutually Develop Transition Plan  Recent Flowsheet Documentation  Taken 3/17/2025 1832 by Joce Pang RN  Transportation Anticipated: family or friend will provide  Patient/Family Anticipated Services at Transition: none  Patient/Family Anticipates Transition to: home with family   Goal Outcome Evaluation:

## 2025-03-17 NOTE — TELEPHONE ENCOUNTER
Rx Refill Note  Requested Prescriptions     Pending Prescriptions Disp Refills    hydrALAZINE (APRESOLINE) 25 MG tablet [Pharmacy Med Name: HYDRALAZINE  25MG TABLETS(ORANGE)] 180 tablet 1     Sig: TAKE 1 TABLET BY MOUTH TWICE DAILY      Last office visit with prescribing clinician: 3/11/2025   Last telemedicine visit with prescribing clinician: Visit date not found   Next office visit with prescribing clinician: 4/4/2025                         Would you like a call back once the refill request has been completed: [] Yes [] No    If the office needs to give you a call back, can they leave a voicemail: [] Yes [] No    Сергей Portillo MA  03/17/25, 10:04 EDT

## 2025-03-17 NOTE — OP NOTE
Preoperative diagnosis: Lumbar stenosis with neurogenic claudication L2-3 and L3-4    Postoperative diagnosis: same    Procedure performed: Bilateral L2-3 laminectomy with neurolysis and facetectomy and bilateral L3-4 laminectomy medial facetectomy with a posterior lumbar interbody fusion at both levels and instrumentation using microsurgical technique microsurgical instrumentation    Spinal Surgery Levels Completed:2 Levels     Surgeon: Luis Dudley M.D.    Assistant: Nahed Felton CFA who was instrumental in helping with hemostasis, visualization of neural structures, and retraction of neural structures.  Her skilled assistance was necessary for the success of this case    Indications for procedure: This patient has been having severe pain in the legs and the back.  Imaging shows severe stenosis at L2-3 and L3-4.  There has been no improvement with nonsurgical treatment and the patient elected to proceed with surgery.    Operative summary: After induction of general anesthesia with intravenous agents patient was intubated and placed on the operating table in the prone position.  All pressure points were padded including peripheral points of entrapment.  The back was then prepped with ChloraPrep.  After a 3 minute drying time the back was draped with Ioban, towels, half sheets and a split sheet.    An incision was made in the posterior iliac crest ridge and a Jamshidi needle was placed into the marrow cavity and 15 mL of marrow were aspirated and placed over Mastergraft.     An incision was then made in the skin in the midline.  The dissection was carried down to the thoracolumbar fascia which was opened in the midline. The muscles were stripped off the L2, L3 and L4 lamina arches.   Once this was done the superior spinous process of L4 as well as the entire spinous process of L2 and L3 and a little bit of the inferior spinous process of L1 were removed with the Leksell rongeur's.  Further dissection the muscle  was then cleaned up over the lamina arches and the facets at L2-3, L3-4 and L4-5.  These were then held back with self-retaining cerebellar retractors and the respective lamina arches were then thinned down with the Midas Oliver drill and cracked directly back off the dura.  Once the midline was opened the remaining lamina arches were removed out to the level of the medial pedicle on the right side.  This was done all the way down to the L4 pedicle.  The entire facet at L2-3 was removed because I could not be confident the L2 nerve root was completely decompressed.  In addition the thecal sac and the nerve root were completely locked out at that level and I did not want to cause any more trauma than necessary putting the cage and so I was able to expose the lateral disc in order to not cause any pressure on the thecal sac when placing the cages.  A combination of the Kerrison's and the Midas Oliver were used to open the lateral recess out to the level of the medial pedicle.  Once the nerve roots were thoroughly decompressed the dural sac was then mobilized medially and the disc was exposed.  The disc was incised and disc material was removed from the disc space using the down-biting curettes and the pituitary ronguers.  Once the disc was emptied as much as possible johnnie were inserted into the disc space to clear the cartilaginous endplate completely and make a space for the cages.  I was then able to take 2 Medtronic Catalyft cages measuring  27 mm in length and place them into the disc space at L3-4.  The Mastergraft and marrow as well as locally harvested bone were also placed into the disc space.  The cages were then expanded until they were tight in the disc space and had produced a lordosis.  Attention was then turned to L2-3 that disc base was more collapsed and it was a little more difficult due to the previous scarring.  It was necessary to mobilize the nerve roots which were scarred to the surrounding tissues.   Once this was done and there appeared to be sufficient room in the disc base to place the cage the same thing was done at the L2-3 level that was done at L3-4 and to further Medtronic F cages were placed along with master graft.    Then 6.5 mm by 50 mm screws were placed into the pedicles of L2 and L3 bilaterally and 6.5 mm by 50 mm screws were placed bilaterally at L4.  Intraoperative x-ray showed that the right L2 screw was a bit lateral and so I replaced it facing more medially.    Finally intraoperative x-ray confirmed good placement of the hardware and the screws were then connected with a shiela.  Following this the area was copiously irrigated with antibiotic solution and then another dose of Kefzol was given prior to closure.  The paraspinous musculature was injected with 100 mL of 1/8% Marcaine with 1:200,000 epinephrine solution.  A drain was placed in the epidural space and tunneled subcutaneously.  The incision was then closed in layers dressed, and the patient was taken to the recovery room in stable condition.  There were no apparent complications and sponge, instrument and needle counts were correct at the end of the procedure.

## 2025-03-18 ENCOUNTER — APPOINTMENT (OUTPATIENT)
Dept: GENERAL RADIOLOGY | Facility: HOSPITAL | Age: 85
DRG: 448 | End: 2025-03-18
Payer: MEDICARE

## 2025-03-18 LAB
ANION GAP SERPL CALCULATED.3IONS-SCNC: 8 MMOL/L (ref 5–15)
BASOPHILS # BLD AUTO: 0.03 10*3/MM3 (ref 0–0.2)
BASOPHILS NFR BLD AUTO: 0.2 % (ref 0–1.5)
BUN SERPL-MCNC: 14 MG/DL (ref 8–23)
BUN/CREAT SERPL: 13.7 (ref 7–25)
CALCIUM SPEC-SCNC: 8.4 MG/DL (ref 8.6–10.5)
CHLORIDE SERPL-SCNC: 101 MMOL/L (ref 98–107)
CO2 SERPL-SCNC: 25 MMOL/L (ref 22–29)
CREAT SERPL-MCNC: 1.02 MG/DL (ref 0.76–1.27)
DEPRECATED RDW RBC AUTO: 43.4 FL (ref 37–54)
EGFRCR SERPLBLD CKD-EPI 2021: 72.9 ML/MIN/1.73
EOSINOPHIL # BLD AUTO: 0.07 10*3/MM3 (ref 0–0.4)
EOSINOPHIL NFR BLD AUTO: 0.5 % (ref 0.3–6.2)
ERYTHROCYTE [DISTWIDTH] IN BLOOD BY AUTOMATED COUNT: 12 % (ref 12.3–15.4)
GLUCOSE BLDC GLUCOMTR-MCNC: 141 MG/DL (ref 70–130)
GLUCOSE SERPL-MCNC: 130 MG/DL (ref 65–99)
HCT VFR BLD AUTO: 32.5 % (ref 37.5–51)
HGB BLD-MCNC: 10.7 G/DL (ref 13–17.7)
IMM GRANULOCYTES # BLD AUTO: 0.08 10*3/MM3 (ref 0–0.05)
IMM GRANULOCYTES NFR BLD AUTO: 0.6 % (ref 0–0.5)
LYMPHOCYTES # BLD AUTO: 1.94 10*3/MM3 (ref 0.7–3.1)
LYMPHOCYTES NFR BLD AUTO: 14.6 % (ref 19.6–45.3)
MCH RBC QN AUTO: 32.1 PG (ref 26.6–33)
MCHC RBC AUTO-ENTMCNC: 32.9 G/DL (ref 31.5–35.7)
MCV RBC AUTO: 97.6 FL (ref 79–97)
MONOCYTES # BLD AUTO: 2.14 10*3/MM3 (ref 0.1–0.9)
MONOCYTES NFR BLD AUTO: 16.1 % (ref 5–12)
NEUTROPHILS NFR BLD AUTO: 68 % (ref 42.7–76)
NEUTROPHILS NFR BLD AUTO: 9.05 10*3/MM3 (ref 1.7–7)
NRBC BLD AUTO-RTO: 0 /100 WBC (ref 0–0.2)
PLATELET # BLD AUTO: 172 10*3/MM3 (ref 140–450)
PMV BLD AUTO: 9.6 FL (ref 6–12)
POTASSIUM SERPL-SCNC: 4.5 MMOL/L (ref 3.5–5.2)
RBC # BLD AUTO: 3.33 10*6/MM3 (ref 4.14–5.8)
SODIUM SERPL-SCNC: 134 MMOL/L (ref 136–145)
WBC NRBC COR # BLD AUTO: 13.31 10*3/MM3 (ref 3.4–10.8)

## 2025-03-18 PROCEDURE — 99024 POSTOP FOLLOW-UP VISIT: CPT

## 2025-03-18 PROCEDURE — 85025 COMPLETE CBC W/AUTO DIFF WBC: CPT | Performed by: NEUROLOGICAL SURGERY

## 2025-03-18 PROCEDURE — 25810000003 SODIUM CHLORIDE 0.9 % SOLUTION: Performed by: STUDENT IN AN ORGANIZED HEALTH CARE EDUCATION/TRAINING PROGRAM

## 2025-03-18 PROCEDURE — 97162 PT EVAL MOD COMPLEX 30 MIN: CPT

## 2025-03-18 PROCEDURE — 97530 THERAPEUTIC ACTIVITIES: CPT

## 2025-03-18 PROCEDURE — 25010000002 CEFAZOLIN PER 500 MG: Performed by: NEUROLOGICAL SURGERY

## 2025-03-18 PROCEDURE — 25010000002 MORPHINE PER 10 MG: Performed by: NEUROLOGICAL SURGERY

## 2025-03-18 PROCEDURE — 72100 X-RAY EXAM L-S SPINE 2/3 VWS: CPT

## 2025-03-18 PROCEDURE — 82948 REAGENT STRIP/BLOOD GLUCOSE: CPT

## 2025-03-18 PROCEDURE — 80048 BASIC METABOLIC PNL TOTAL CA: CPT | Performed by: STUDENT IN AN ORGANIZED HEALTH CARE EDUCATION/TRAINING PROGRAM

## 2025-03-18 RX ORDER — HYDROCODONE BITARTRATE AND ACETAMINOPHEN 5; 325 MG/1; MG/1
1 TABLET ORAL ONCE
Refills: 0 | Status: COMPLETED | OUTPATIENT
Start: 2025-03-18 | End: 2025-03-18

## 2025-03-18 RX ADMIN — MORPHINE SULFATE 1 MG: 2 INJECTION, SOLUTION INTRAMUSCULAR; INTRAVENOUS at 20:35

## 2025-03-18 RX ADMIN — ACETAMINOPHEN 500 MG: 500 TABLET, FILM COATED ORAL at 09:11

## 2025-03-18 RX ADMIN — CEFAZOLIN 2000 MG: 2 INJECTION, POWDER, FOR SOLUTION INTRAMUSCULAR; INTRAVENOUS at 12:37

## 2025-03-18 RX ADMIN — SODIUM CHLORIDE 250 ML: 900 INJECTION, SOLUTION INTRAVENOUS at 15:09

## 2025-03-18 RX ADMIN — HYDROCODONE BITARTRATE AND ACETAMINOPHEN 1 TABLET: 5; 325 TABLET ORAL at 01:26

## 2025-03-18 RX ADMIN — METHOCARBAMOL TABLETS 500 MG: 500 TABLET, COATED ORAL at 02:31

## 2025-03-18 RX ADMIN — CEFAZOLIN 2000 MG: 2 INJECTION, POWDER, FOR SOLUTION INTRAMUSCULAR; INTRAVENOUS at 02:31

## 2025-03-18 RX ADMIN — MORPHINE SULFATE 1 MG: 2 INJECTION, SOLUTION INTRAMUSCULAR; INTRAVENOUS at 02:31

## 2025-03-18 RX ADMIN — HYDROCODONE BITARTRATE AND ACETAMINOPHEN 1 TABLET: 5; 325 TABLET ORAL at 18:41

## 2025-03-18 RX ADMIN — CEFAZOLIN 2000 MG: 2 INJECTION, POWDER, FOR SOLUTION INTRAMUSCULAR; INTRAVENOUS at 18:42

## 2025-03-18 RX ADMIN — MORPHINE SULFATE 1 MG: 2 INJECTION, SOLUTION INTRAMUSCULAR; INTRAVENOUS at 09:15

## 2025-03-18 RX ADMIN — Medication 3 ML: at 20:58

## 2025-03-18 RX ADMIN — ATORVASTATIN CALCIUM 10 MG: 20 TABLET, FILM COATED ORAL at 20:30

## 2025-03-18 RX ADMIN — SENNOSIDES AND DOCUSATE SODIUM 2 TABLET: 50; 8.6 TABLET ORAL at 09:12

## 2025-03-18 RX ADMIN — HYDROCHLOROTHIAZIDE 12.5 MG: 12.5 TABLET ORAL at 09:12

## 2025-03-18 RX ADMIN — HYDROCODONE BITARTRATE AND ACETAMINOPHEN 1 TABLET: 5; 325 TABLET ORAL at 12:37

## 2025-03-18 RX ADMIN — PREGABALIN 100 MG: 100 CAPSULE ORAL at 09:12

## 2025-03-18 RX ADMIN — HYDRALAZINE HYDROCHLORIDE 25 MG: 25 TABLET ORAL at 09:11

## 2025-03-18 RX ADMIN — SPIRONOLACTONE 50 MG: 25 TABLET, FILM COATED ORAL at 09:11

## 2025-03-18 RX ADMIN — Medication 3 ML: at 09:12

## 2025-03-18 RX ADMIN — Medication 10 MG: at 20:30

## 2025-03-18 RX ADMIN — METHOCARBAMOL TABLETS 500 MG: 500 TABLET, COATED ORAL at 18:41

## 2025-03-18 RX ADMIN — HYDROCODONE BITARTRATE AND ACETAMINOPHEN 1 TABLET: 5; 325 TABLET ORAL at 04:55

## 2025-03-18 RX ADMIN — MORPHINE SULFATE 1 MG: 2 INJECTION, SOLUTION INTRAMUSCULAR; INTRAVENOUS at 00:10

## 2025-03-18 RX ADMIN — PREGABALIN 100 MG: 100 CAPSULE ORAL at 20:30

## 2025-03-18 RX ADMIN — METHOCARBAMOL TABLETS 500 MG: 500 TABLET, COATED ORAL at 12:37

## 2025-03-18 RX ADMIN — MORPHINE SULFATE 1 MG: 2 INJECTION, SOLUTION INTRAMUSCULAR; INTRAVENOUS at 15:08

## 2025-03-18 RX ADMIN — MORPHINE SULFATE 1 MG: 2 INJECTION, SOLUTION INTRAMUSCULAR; INTRAVENOUS at 06:34

## 2025-03-18 NOTE — PLAN OF CARE
Goal Outcome Evaluation:  Plan of Care Reviewed With: patient        Progress: no change  Outcome Evaluation: vss. nvi. dressing CDI. lucina drain. HOB flat until this morning. pain not well managed with PO and IV meds, often 7/10 pain. chahal. plan to d/c when medically stable.

## 2025-03-18 NOTE — PROGRESS NOTES
"    Name: Olu Izquierdo ADMIT: 3/17/2025   : 1940  PCP: Edenilson Henry MD    MRN: [id number] LOS: 1 days   AGE/SEX: 84 y.o. male  ROOM: [facility]     Subjective   Subjective   Patient seen and examined this morning.  He is awake and resting in bed, doing okay at present, has some ongoing back pain.       Objective   Objective   Vital Signs  Temp:  [97.7 °F (36.5 °C)-98.4 °F (36.9 °C)] 97.8 °F (36.6 °C)  Heart Rate:  [71-84] 78  Resp:  [12-21] 16  BP: (105-147)/(47-72) 122/47  SpO2:  [96 %-100 %] 97 %  on  Flow (L/min) (Oxygen Therapy):  [2] 2;   Device (Oxygen Therapy): room air  Body mass index is 26.31 kg/m².  Physical Exam  Vitals and nursing note reviewed.   Constitutional:       General: He is awake. He is not in acute distress.  Cardiovascular:      Rate and Rhythm: Normal rate.      Pulses: Normal pulses.      Heart sounds: Normal heart sounds.   Pulmonary:      Effort: Pulmonary effort is normal. No respiratory distress.      Breath sounds: Normal breath sounds.   Abdominal:      Palpations: Abdomen is soft.      Tenderness: There is no abdominal tenderness.   Musculoskeletal:         General: Tenderness present.   Skin:     General: Skin is warm and dry.   Neurological:      Mental Status: He is alert.   Psychiatric:         Behavior: Behavior is cooperative.       Results Review     I reviewed the patient's new clinical results.  Results from last 7 days   Lab Units 25  0407 25  1001   WBC 10*3/mm3 13.31* 8.19   HEMOGLOBIN g/dL 10.7* 13.3   PLATELETS 10*3/mm3 172 222             No results found for: \"HGBA1C\", \"POCGLU\"    XR Spine Lumbar 2 or 3 View  Result Date: 3/18/2025  As above    This report was finalized on 3/18/2025 7:42 AM by Dr. Сергей Pnoce M.D on Workstation: [id number]        I have personally reviewed all medications:  Scheduled Medications  acetaminophen, 500 mg, Oral, Daily  atorvastatin, 10 mg, Oral, Nightly  ceFAZolin, 2,000 mg, Intravenous, Q8H  hydrALAZINE, 25 " mg, Oral, BID  hydroCHLOROthiazide, 12.5 mg, Oral, Daily  melatonin, 10 mg, Oral, Nightly  methocarbamol, 500 mg, Oral, Q8H  metoprolol succinate XL, 25 mg, Oral, Nightly  pregabalin, 100 mg, Oral, BID  sodium chloride, 3 mL, Intravenous, Q12H  spironolactone, 50 mg, Oral, Daily    Infusions  lactated ringers, 9 mL/hr, Last Rate: 9 mL/hr (03/17/25 0916)    Diet  Diet: Liquid; Clear Liquid; Fluid Consistency: Thin (IDDSI 0)    I have personally reviewed:  [x]  Laboratory   []  Microbiology   [x]  Radiology   []  EKG/Telemetry  []  Cardiology/Vascular   []  Pathology    []  Records       Assessment/Plan     Active Hospital Problems    Diagnosis  POA    **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       84 y.o. male admitted with Spinal stenosis of lumbar region with neurogenic claudication.    Lumbar spinal stenosis with neurogenic claudication  - Management per primary, s/p L2/3, L3/4 laminectomy with neurolysis, fusion and instrumentation  - pain mgmt per VICKIE  - encourage I-S     Hypertension  - BP appears acceptable at present on current medication regimen. Can continue as prescribed for now.  - Will check BMP to assess renal function.     Leukocytosis  - Noted on labs, could be reactive, no current signs/symptoms to suggest infection. For now, will monitor, repeat CBC in AM. Further management based on results of future testing.    Anemia  - Hemoglobin low on most recent labs, worse from prior, could be related to perioperative blood loss. No current evidence of overt blood loss. No indications for acute intervention at this time.    - Order repeat CBC in AM for reassessment. Continue to monitor, transfuse for hemoglobin <7.    HLD  - statin      Addendum: Patient with dizziness with standing this afternoon and was found to be orthostatic +,  so going to hold BP medications for remainder of day and order small fluid bolus. Trend BP to guide further management.    DVT prophylaxis per primary.  Full code.  Discussed with patient and nursing staff.  Anticipate discharge  TBD   per primary  Expected discharge date/ time has not been documented.      Gagan Thompson DO  [facility]  03/18/25

## 2025-03-18 NOTE — NURSING NOTE
This RN was notified that pt fell while ambulating with PT. This RN quickly responded to assess pt. Vital signs obtained, no increased pain, pt safely assisted to recliner assist x 4-5. VICKIE and LHA notified.    Upon pt request to return to bed, this RN obtained sitting vitals: 106/66, standing vitals: 74/45 and associated paleness/dizziness/BLE weakness. Pt safetly assisted x 2 to lay in bed. /66 after laying in bed. L[facility] notified, BP meds held, 250 ml bolus given.     Pt states history of dizziness while ambulating after previous back surgeries, and he states he has had poor appetite/fluid intake the past 5 days, which he thinks is causing his weakness. This RN encouraged PO intake. Will continue to monitor.

## 2025-03-18 NOTE — PROGRESS NOTES
[facility] THORACIC/LUMBAR NEUROSURGERY POSTOP NOTE      CC: POD 1 L2-4 laminectomy with fusion      Subjective     Interval History: Reports resolution of pre-op right leg pain. Having some discomfort in his low back he attributes to laying on back all night.  Overall pain is well-controlled.  Positive gas. 180ml out pf CJ post-op      Objective     Vital signs in last 24 hours:  Temp:  [97.7 °F (36.5 °C)-98.4 °F (36.9 °C)] 97.8 °F (36.6 °C)  Heart Rate:  [71-84] 72  Resp:  [12-21] 16  BP: (105-147)/(47-70) 146/66    Intake/Output this shift:  I/O this shift:  In: -   Out: 90 [Drains:90]    CJ 90 ml overnight, 90 ml this morning,     LABS:  Results from last 7 days   Lab Units 03/18/25  0407   WBC 10*3/mm3 13.31*   HEMOGLOBIN g/dL 10.7*   HEMATOCRIT % 32.5*   PLATELETS 10*3/mm3 172      Results from last 7 days   Lab Units 03/18/25  0812   SODIUM mmol/L 134*   POTASSIUM mmol/L 4.5   CHLORIDE mmol/L 101   CO2 mmol/L 25.0   BUN mg/dL 14   CREATININE mg/dL 1.02   GLUCOSE mg/dL 130*   CALCIUM mg/dL 8.4*        IMAGING STUDIES:  Lumbar XR: expected post-op findings with hardware intact at L2-4    I personally viewed and interpreted the patient's Chart, labs, medications, and imaging has been reviewed by and discussed with Dr. Dudley .    Meds reviewed/changed: Yes  Tylenol 5 mg p.o. daily  Cefazolin 2000 mg IV every 8 hours for surgical prophylaxis  Robaxin 500 mg p.o. every 8 hours  Lyrica 100 mg p.o. twice daily  Noemy-Colace 8.6-50 mg 2 tabs p.o. twice daily x 1  Norco 5 p.o. every 4 hours as needed x 3/24 hours  Morphine 1 mg IV every 2 hours as needed x 6/12 hours    Physical Exam:    General:   Awake, alert.  Resting in bed, no acute distress  Back:    Posterior lumbar incision well-appearing approximated with Steri-Strips.  No redness drainage or swelling.  Dressing CDI. CJ site well.  With significant amount of thin serosanguineous output in bulb.   Motor:   Legs strong on exam, slightly weaker in left leg at  baseline  Sensation:   Peripheral neuropathy with some numbness in bilateral feet at baseline. Intact to light touch throughout otherwise  Station and Gait:             Per PT note 3/18:  Patient sat up to the EOB with SBA and use of bed rails via log roll. Patient reported no lightheadedness when sittng at EOB. Patient stood with CGA. Patient ambulated to BR with CGA and support of rwx. Patient addressed needs without difficulty. Patient then ambulated in room with rwx and CGA. Gait slow and somewhat shuffled. VCs to stay close to rwx. Once reaching the doorway patient ANIA LEs began to buckle and patient reported feeling lightheaded. Patient lowered to the ground and this therapists called for assist. Patient laid flat on back. BP checked after several moments of laying flat to be 146/66. Patient then reported feeling more clear. Patient assisted into long sitting at EOB and then assisted by being lifted. Assist of 4-5 to the recliner. Patient maintained spinal precautions throughout transfers and declined increased pain following. Patient would continue to benefit from skilled PT intervention to address deficits in functional mobility. PT recommendations pending progress. PT will continue to monitor.   Extremities:   Wearing SCD      Assessment & Plan     ASSESSMENT:      Spinal stenosis of lumbar region with neurogenic claudication    Essential hypertension    Hypercholesterolemia    Pleasant 84-year-old male is postop day 1 s/p L2-4 laminectomy with fusion.  Reports resolution of right lower extremity pain. Post-op XR looks good. Has had 180 mL output in his CJ since postop, will keep for now. Incision well appearing.  Reports his pain is overall well-controlled but with some stiffness and soreness he attributes to laying flat in the bed all night.  He thought he had to lay flat on his back post-op but he is able to lay on his side as tolerable and we prefer his HOB > 30 degrees. He got lightheaded while working  "with PT but this is not unexpected when getting up for the first time post-op. Will keep working on mobility. Encouraged OOB activity as much as tolerable. PT recommendations for discharge pending.      PLAN:     Keep CJ  D/c f/c   Mobilize/activity as tolerable  Up to chair for meals  Pain control        I discussed the patient's findings and my recommendations with patient, nursing staff, and Dr. Dudley    During patient visit, I utilized appropriate personal protective equipment including mask and gloves.  Mask used was standard procedure mask. Appropriate PPE was worn during the entire visit.  Hand hygiene was completed before and after.      LOS: 1 day       Xiomy Cancino, APRN  3/18/2025  12:09 EDT    \"Dictated utilizing Dragon dictation\".     "

## 2025-03-18 NOTE — DISCHARGE PLACEMENT REQUEST
"Cherise Izquierdo \"ALFRED\" (84 y.o. Male)       Date of Birth   1940    Social Security Number       Address   [address] [place], [place] [address]    Home Phone   [phone]    MRN   [id number]       S[employer]   Syn[employer]    Marital Status                               Admission Date   3/17/2025    Admission Type   Elective    Admitting Provider   Luis Dudley MD    Attending Provider   Luis Dudley MD    Department, Room/Bed   [address], P790/1       Discharge Date       Discharge Disposition       Discharge Destination                                 Attending Provider: Luis Dudley MD    Allergies: Dilaudid [Hydromorphone Hcl]    Isolation: None   Infection: None   Code Status: CPR    Ht: 182.9 cm (72\")   Wt: 88 kg (194 lb 0.1 oz)    Admission Cmt: None   Principal Problem: Spinal stenosis of lumbar region with neurogenic claudication [M48.062]                   Active Insurance as of 3/17/2025       Primary Coverage       Payor Plan Insurance Group Employer/Plan Group    MEDICARE MEDICARE A & B        Payor Plan Address Payor Plan Phone Number Payor Plan Fax Number Effective Dates    [address] [phone]  9/1/2006 - None Entered    [facility] [id number]         Subscriber Name Subscriber Birth Date Member ID       CHERISE IZQUIERDO 1940 [id number]               Secondary Coverage       Payor Plan Insurance Group Employer/Plan Group    [facility] SUPP [id number]       Payor Plan Address Payor Plan Phone Number Payor Plan Fax Number Effective Dates    [address]   12/1/2016 - None Entered    [facility] [id number]         Subscriber Name Subscriber Birth Date Member ID       CHERISE IZQUIERDO 1940 [id number]                     Emergency Contacts        (Rel.) Home Phone Work Phone Mobile Phone    ALISON IZQUIERDO (Son) [phone] -- [phone]    Hollie Izquierdo (Spouse) -- -- [phone]              "

## 2025-03-18 NOTE — THERAPY EVALUATION
"Patient Name: Olu Izquierdo  : 1940    MRN: [id number]                              Today's Date: 3/18/2025       Admit Date: 3/17/2025    Visit Dx:     ICD-10-CM ICD-9-CM   1. Spinal stenosis of lumbar region with neurogenic claudication  M48.062 724.03     Patient Active Problem List   Diagnosis    Essential hypertension    Hypercholesterolemia    Lower urinary tract infectious disease    Spinal stenosis of lumbar region with neurogenic claudication    Mild episode of recurrent major depressive disorder    Neuropathy     Past Medical History:   Diagnosis Date    Anesthesia complication     HAD DIFFICULTY URINATING AFTER SURGERY    Arthritis     Hyperlipidemia     Hypertension     Injury of back     Back surgery 1984 partial discectomy with \"sciatic nerve damage\"    Low back pain     Neuropathy     BILAT FEET    Numbness     left knee    Spinal stenosis     LUMBAR     Past Surgical History:   Procedure Laterality Date    CATARACT EXTRACTION, BILATERAL      EPIDURAL BLOCK      EYE SURGERY      LUMBAR DISCECTOMY      LUMBAR DISCECTOMY Right 2019    Procedure: Right Lumbar two-three laminectomy with metrix;  Surgeon: Luis Dudley MD;  Location: [facility];  Service: Neurosurgery    LUMBAR DISCECTOMY FUSION INSTRUMENTATION N/A 3/17/2025    Procedure: Lumbar 2 to lumbar 3 and lumbar 3 to lumbar 4 laminectomy with neurolysis with fusion and instrumentation;  Surgeon: Luis Dudley MD;  Location: [facility];  Service: Neurosurgery;  Laterality: N/A;      General Information       Row Name 25 1128          Physical Therapy Time and Intention    Document Type evaluation  -SM     Mode of Treatment individual therapy;physical therapy  -       Row Name 25 1128          General Information    Patient Profile Reviewed yes  -SM     Prior Level of Function independent:  -SM     Existing Precautions/Restrictions fall;orthostatic hypotension;spinal  -       Row Name 25 1128       "    Living Environment    Current Living Arrangements home  -     People in Home spouse  -       Row Name 03/18/25 1128          Home Main Entrance    Number of Stairs, Main Entrance three  -       Row Name 03/18/25 1128          Cognition    Orientation Status (Cognition) oriented x 4  -       Row Name 03/18/25 1128          Safety Issues/Impairments Affecting Functional Mobility    Impairments Affecting Function (Mobility) balance;endurance/activity tolerance;strength;pain  -               User Key  (r) = Recorded By, (t) = Taken By, (c) = Cosigned By      Initials Name Provider Type     Kianna Lira PT Physical Therapist                   Mobility       Row Name 03/18/25 1128          Bed Mobility    Bed Mobility supine-sit  -     Supine-Sit [place] (Bed Mobility) standby assist;verbal cues  -     Assistive Device (Bed Mobility) bed rails  -     Comment, (Bed Mobility) Log roll  -       Row Name 03/18/25 1128          Sit-Stand Transfer    Sit-Stand [place] (Transfers) contact guard  -     Assistive Device (Sit-Stand Transfers) walker, front-wheeled  -     Comment, (Sit-Stand Transfer) From EOB and commode  -       Row Name 03/18/25 1128          Gait/Stairs (Locomotion)    [place] Level (Gait) contact guard;verbal cues  -     Assistive Device (Gait) walker, front-wheeled  -     Distance in Feet (Gait) 30  -     Deviations/Abnormal Patterns (Gait) tia decreased;gait speed decreased;festinating/shuffling  -     Bilateral Gait Deviations forward flexed posture  -     Comment, (Gait/Stairs) Gait slow and somewhat shuffled. VCs to stay close to rwx.  -               User Key  (r) = Recorded By, (t) = Taken By, (c) = Cosigned By      Initials Name Provider Type     Kianna Lira PT Physical Therapist                   Obj/Interventions       Row Name 03/18/25 1129          Range of Motion Comprehensive    General Range of Motion bilateral lower  extremity ROM WFL  -       Row Name 03/18/25 1129          Strength Comprehensive (MMT)    General Manual Muscle Testing (MMT) Assessment lower extremity strength deficits identified  -     Comment, General Manual Muscle Testing (MMT) Assessment Expected post op weakness  -       Row Name 03/18/25 1129          Balance    Balance Assessment sitting static balance;sitting dynamic balance;standing static balance;standing dynamic balance  -     Static Sitting Balance standby assist  -     Dynamic Sitting Balance standby assist  -     Position, Sitting Balance sitting edge of bed  -     Static Standing Balance contact guard  -     Dynamic Standing Balance contact guard;verbal cues;minimal assist  -SM     Position/Device Used, Standing Balance supported;walker, front-wheeled  -     Balance Interventions sitting;standing;sit to stand;static;dynamic;supported  -               User Key  (r) = Recorded By, (t) = Taken By, (c) = Cosigned By      Initials Name Provider Type     Kianna Lira, PT Physical Therapist                   Goals/Plan       Row Name 03/18/25 1142          Bed Mobility Goal 1 (PT)    Activity/Assistive Device (Bed Mobility Goal 1, PT) bed mobility activities, all  -SM     [place] Level/Cues Needed (Bed Mobility Goal 1, PT) modified independence  -SM     Time Frame (Bed Mobility Goal 1, PT) 1 week  -       Row Name 03/18/25 1142          Transfer Goal 1 (PT)    Activity/Assistive Device (Transfer Goal 1, PT) sit-to-stand/stand-to-sit;bed-to-chair/chair-to-bed  -     [place] Level/Cues Needed (Transfer Goal 1, PT) modified independence  -SM     Time Frame (Transfer Goal 1, PT) 1 week  -       Row Name 03/18/25 1142          Gait Training Goal 1 (PT)    Activity/Assistive Device (Gait Training Goal 1, PT) gait (walking locomotion);walker, rolling  -     [place] Level (Gait Training Goal 1, PT) modified independence  -SM     Distance (Gait Training Goal 1,  PT) 100ft  -     Time Frame (Gait Training Goal 1, PT) 1 week  -SM               User Key  (r) = Recorded By, (t) = Taken By, (c) = Cosigned By      Initials Name Provider Type     Kianna Lira, NITESH Physical Therapist                   Clinical Impression       Row Name 03/18/25 1129          Pain    Pain Location back  -SM     Pain Side/Orientation bilateral;lower  -SM     Pre/Posttreatment Pain Comment Patient did not rate  -       Row Name 03/18/25 1129          Plan of Care Review    Plan of Care Reviewed With patient  -SM     Outcome Evaluation Patient is an 84 y.o male who presents POD1 L2/3 L4/5 laminectomy and fusion. Patient AOx4 supine in bed upon arrival. Patient reports he lives at home with his spouse whom he cares for. Patient reports he uses a rwx in the home and a cane outside the home. Patient has 3 BLANCHE. Patient sat up to the EOB with SBA and use of bed rails via log roll. Patient reported no lightheadedness when sittng at EOB. Patient stood with CGA. Patient ambulated to BR with CGA and support of rwx. Patient addressed needs without difficulty. Patient then ambulated in room with rwx and CGA. Gait slow and somewhat shuffled. VCs to stay close to rwx. Once reaching the doorway patient B LEs began to buckle and patient reported feeling lightheaded. Patient lowered to the ground and this therapists called for assist.  Patient laid flat on back. BP checked after several moments of laying flat to be 146/66. Patient then reported feeling more clear. Patient assisted into long sitting at EOB and then assisted by being lifted. Assist of 4-5 to the recliner. Patient maintained spinal precautions throughout  transfers and declined increased pain following. Patient would continue to benefit from skilled PT intervention to address deficits in functional mobility. PT recommendations pending progress. PT will continue to monitor.  -       Row Name 03/18/25 1129          Therapy Assessment/Plan (PT)     Rehab Potential (PT) good  -SM     Criteria for Skilled Interventions Met (PT) yes  -SM     Therapy Frequency (PT) 6 times/wk  -SM       Row Name 03/18/25 1129          Vital Signs    Pre Patient Position Supine  -SM     Intra Patient Position Standing  -SM     Post Patient Position Sitting  -SM       Row Name 03/18/25 1129          Positioning and Restraints    Pre-Treatment Position in bed  -SM     Post Treatment Position chair  -SM     In Chair notified nsg;reclined;call light within reach;encouraged to call for assist;with nsg  -               User Key  (r) = Recorded By, (t) = Taken By, (c) = Cosigned By      Initials Name Provider Type     Kianna Lira PT Physical Therapist                   Outcome Measures       Row Name 03/18/25 1142          How much help from another person do you currently need...    Turning from your back to your side while in flat bed without using bedrails? 3  -SM     Moving from lying on back to sitting on the side of a flat bed without bedrails? 3  -SM     Moving to and from a bed to a chair (including a wheelchair)? 3  -SM     Standing up from a chair using your arms (e.g., wheelchair, bedside chair)? 3  -SM     Climbing 3-5 steps with a railing? 2  -SM     To walk in hospital room? 3  -SM     AM-PAC 6 Clicks Score (PT) 17  -SM     Highest Level of Mobility Goal 5 --> Static standing  -SM       Row Name 03/18/25 1142          Functional Assessment    Outcome Measure Options AM-PAC 6 Clicks Basic Mobility (PT)  -               User Key  (r) = Recorded By, (t) = Taken By, (c) = Cosigned By      Initials Name Provider Type    Kianna Leon PT Physical Therapist                                 Physical Therapy Education       Title: PT OT SLP Therapies (Done)       Topic: Physical Therapy (Done)       Point: Mobility training (Done)       Learning Progress Summary            Patient Acceptance, E, VU by PAULETTE at 3/18/2025 1143                      Point: Home  exercise program (Done)       Learning Progress Summary            Patient Acceptance, E, VU by  at 3/18/2025 1143                      Point: Body mechanics (Done)       Learning Progress Summary            Patient Acceptance, E, VU by  at 3/18/2025 1143                      Point: Precautions (Done)       Learning Progress Summary            Patient Acceptance, E, VU by  at 3/18/2025 1143                                      User Key       Initials Effective Dates Name Provider Type Discipline     05/02/22 -  Kianna Lira, PT Physical Therapist PT                  PT Recommendation and Plan     Outcome Evaluation: Patient is an 84 y.o male who presents POD1 L2/3 L4/5 laminectomy and fusion. Patient AOx4 supine in bed upon arrival. Patient reports he lives at home with his spouse whom he cares for. Patient reports he uses a rwx in the home and a cane outside the home. Patient has 3 BLANCHE. Patient sat up to the EOB with SBA and use of bed rails via log roll. Patient reported no lightheadedness when sittng at EOB. Patient stood with CGA. Patient ambulated to BR with CGA and support of rwx. Patient addressed needs without difficulty. Patient then ambulated in room with rwx and CGA. Gait slow and somewhat shuffled. VCs to stay close to rwx. Once reaching the doorway patient B LEs began to buckle and patient reported feeling lightheaded. Patient lowered to the ground and this therapists called for assist.  Patient laid flat on back. BP checked after several moments of laying flat to be 146/66. Patient then reported feeling more clear. Patient assisted into long sitting at EOB and then assisted by being lifted. Assist of 4-5 to the recliner. Patient maintained spinal precautions throughout  transfers and declined increased pain following. Patient would continue to benefit from skilled PT intervention to address deficits in functional mobility. PT recommendations pending progress. PT will continue to  monitor.     Time Calculation:         PT Charges       Row Name 03/18/25 1143             Time Calculation    Start Time 1041  -SM      Stop Time 1105  -SM      Time Calculation (min) 24 min  -SM      PT Received On 03/18/25  -      PT - Next Appointment 03/19/25  -      PT Goal Re-Cert Due Date 03/25/25  -         Time Calculation- PT    Total Timed Code Minutes- PT 18 minute(s)  -SM         Timed Charges    04322 - PT Therapeutic Activity Minutes 18  -SM         Total Minutes    Timed Charges Total Minutes 18  -SM       Total Minutes 18  -SM                User Key  (r) = Recorded By, (t) = Taken By, (c) = Cosigned By      Initials Name Provider Type     Kianna Lira, PT Physical Therapist                  Therapy Charges for Today       Code Description Service Date Service Provider Modifiers Qty    28663810346 HC PT THERAPEUTIC ACT EA 15 MIN 3/18/2025 Kianna Lira, PT GP 1    52160634170 HC PT EVAL MOD COMPLEXITY 3 3/18/2025 Kianna Lira, PT GP 1            PT G-Codes  Outcome Measure Options: AM-PAC 6 Clicks Basic Mobility (PT)  AM-PAC 6 Clicks Score (PT): 17  PT Discharge Summary  Anticipated Discharge Disposition (PT): home with 24/7 care, home with assist, home with home health, skilled nursing facility    Kianna Lira PT  3/18/2025

## 2025-03-18 NOTE — PLAN OF CARE
Goal Outcome Evaluation:  Plan of Care Reviewed With: patient           Outcome Evaluation: Patient is an 84 y.o male who presents POD1 L2/3 L4/5 laminectomy and fusion. Patient AOx4 supine in bed upon arrival. Patient reports he lives at home with his spouse whom he cares for. Patient reports he uses a rwx in the home and a cane outside the home. Patient has 3 BLANCHE. Patient sat up to the EOB with SBA and use of bed rails via log roll. Patient reported no lightheadedness when sittng at EOB. Patient stood with CGA. Patient ambulated to BR with CGA and support of rwx. Patient addressed needs without difficulty. Patient then ambulated in room with rwx and CGA. Gait slow and somewhat shuffled. VCs to stay close to rwx. Once reaching the doorway patient B LEs began to buckle and patient reported feeling lightheaded. Patient lowered to the ground and this therapists called for assist.  Patient laid flat on back. BP checked after several moments of laying flat to be 146/66. Patient then reported feeling more clear. Patient assisted into long sitting at EOB and then assisted by being lifted. Assist of 4-5 to the recliner. Patient maintained spinal precautions throughout  transfers and declined increased pain following. Patient would continue to benefit from skilled PT intervention to address deficits in functional mobility. PT recommendations pending progress. PT will continue to monitor.    Anticipated Discharge Disposition (PT): home with 24/7 care, home with assist, home with home health, skilled nursing facility

## 2025-03-19 ENCOUNTER — APPOINTMENT (OUTPATIENT)
Dept: GENERAL RADIOLOGY | Facility: HOSPITAL | Age: 85
DRG: 448 | End: 2025-03-19
Payer: MEDICARE

## 2025-03-19 LAB
ANION GAP SERPL CALCULATED.3IONS-SCNC: 10 MMOL/L (ref 5–15)
BASOPHILS # BLD AUTO: 0.03 10*3/MM3 (ref 0–0.2)
BASOPHILS NFR BLD AUTO: 0.3 % (ref 0–1.5)
BUN SERPL-MCNC: 11 MG/DL (ref 8–23)
BUN/CREAT SERPL: 13.4 (ref 7–25)
CALCIUM SPEC-SCNC: 8.4 MG/DL (ref 8.6–10.5)
CHLORIDE SERPL-SCNC: 99 MMOL/L (ref 98–107)
CO2 SERPL-SCNC: 24 MMOL/L (ref 22–29)
CREAT SERPL-MCNC: 0.82 MG/DL (ref 0.76–1.27)
DEPRECATED RDW RBC AUTO: 39.6 FL (ref 37–54)
EGFRCR SERPLBLD CKD-EPI 2021: 87.2 ML/MIN/1.73
EOSINOPHIL # BLD AUTO: 0.12 10*3/MM3 (ref 0–0.4)
EOSINOPHIL NFR BLD AUTO: 1.1 % (ref 0.3–6.2)
ERYTHROCYTE [DISTWIDTH] IN BLOOD BY AUTOMATED COUNT: 11.7 % (ref 12.3–15.4)
GLUCOSE SERPL-MCNC: 116 MG/DL (ref 65–99)
HCT VFR BLD AUTO: 29.7 % (ref 37.5–51)
HGB BLD-MCNC: 10.2 G/DL (ref 13–17.7)
IMM GRANULOCYTES # BLD AUTO: 0.06 10*3/MM3 (ref 0–0.05)
IMM GRANULOCYTES NFR BLD AUTO: 0.6 % (ref 0–0.5)
LYMPHOCYTES # BLD AUTO: 1.33 10*3/MM3 (ref 0.7–3.1)
LYMPHOCYTES NFR BLD AUTO: 12.3 % (ref 19.6–45.3)
MCH RBC QN AUTO: 32.1 PG (ref 26.6–33)
MCHC RBC AUTO-ENTMCNC: 34.3 G/DL (ref 31.5–35.7)
MCV RBC AUTO: 93.4 FL (ref 79–97)
MONOCYTES # BLD AUTO: 1.79 10*3/MM3 (ref 0.1–0.9)
MONOCYTES NFR BLD AUTO: 16.6 % (ref 5–12)
NEUTROPHILS NFR BLD AUTO: 69.1 % (ref 42.7–76)
NEUTROPHILS NFR BLD AUTO: 7.48 10*3/MM3 (ref 1.7–7)
NRBC BLD AUTO-RTO: 0 /100 WBC (ref 0–0.2)
PLATELET # BLD AUTO: 164 10*3/MM3 (ref 140–450)
PMV BLD AUTO: 9.5 FL (ref 6–12)
POTASSIUM SERPL-SCNC: 4.1 MMOL/L (ref 3.5–5.2)
RBC # BLD AUTO: 3.18 10*6/MM3 (ref 4.14–5.8)
SODIUM SERPL-SCNC: 133 MMOL/L (ref 136–145)
WBC NRBC COR # BLD AUTO: 10.81 10*3/MM3 (ref 3.4–10.8)

## 2025-03-19 PROCEDURE — 25010000002 CEFAZOLIN PER 500 MG: Performed by: NEUROLOGICAL SURGERY

## 2025-03-19 PROCEDURE — 99024 POSTOP FOLLOW-UP VISIT: CPT | Performed by: NURSE PRACTITIONER

## 2025-03-19 PROCEDURE — 85025 COMPLETE CBC W/AUTO DIFF WBC: CPT | Performed by: STUDENT IN AN ORGANIZED HEALTH CARE EDUCATION/TRAINING PROGRAM

## 2025-03-19 PROCEDURE — 97530 THERAPEUTIC ACTIVITIES: CPT

## 2025-03-19 PROCEDURE — 74018 RADEX ABDOMEN 1 VIEW: CPT

## 2025-03-19 PROCEDURE — 80048 BASIC METABOLIC PNL TOTAL CA: CPT | Performed by: STUDENT IN AN ORGANIZED HEALTH CARE EDUCATION/TRAINING PROGRAM

## 2025-03-19 RX ORDER — ZOLPIDEM TARTRATE 5 MG/1
5 TABLET ORAL NIGHTLY PRN
Status: DISCONTINUED | OUTPATIENT
Start: 2025-03-19 | End: 2025-03-24 | Stop reason: HOSPADM

## 2025-03-19 RX ADMIN — Medication 3 ML: at 20:11

## 2025-03-19 RX ADMIN — PREGABALIN 100 MG: 100 CAPSULE ORAL at 20:06

## 2025-03-19 RX ADMIN — SENNOSIDES AND DOCUSATE SODIUM 2 TABLET: 50; 8.6 TABLET ORAL at 08:46

## 2025-03-19 RX ADMIN — ACETAMINOPHEN 500 MG: 500 TABLET, FILM COATED ORAL at 08:46

## 2025-03-19 RX ADMIN — METHOCARBAMOL TABLETS 500 MG: 500 TABLET, COATED ORAL at 12:07

## 2025-03-19 RX ADMIN — METHOCARBAMOL TABLETS 500 MG: 500 TABLET, COATED ORAL at 18:30

## 2025-03-19 RX ADMIN — Medication 10 ML: at 20:10

## 2025-03-19 RX ADMIN — POLYETHYLENE GLYCOL 3350 17 G: 17 POWDER, FOR SOLUTION ORAL at 04:21

## 2025-03-19 RX ADMIN — ATORVASTATIN CALCIUM 10 MG: 20 TABLET, FILM COATED ORAL at 20:06

## 2025-03-19 RX ADMIN — Medication 10 MG: at 20:06

## 2025-03-19 RX ADMIN — HYDROCODONE BITARTRATE AND ACETAMINOPHEN 1 TABLET: 5; 325 TABLET ORAL at 15:37

## 2025-03-19 RX ADMIN — ZOLPIDEM TARTRATE 5 MG: 5 TABLET, FILM COATED ORAL at 21:17

## 2025-03-19 RX ADMIN — CEFAZOLIN 2000 MG: 2 INJECTION, POWDER, FOR SOLUTION INTRAMUSCULAR; INTRAVENOUS at 02:08

## 2025-03-19 RX ADMIN — HYDROCODONE BITARTRATE AND ACETAMINOPHEN 1 TABLET: 5; 325 TABLET ORAL at 02:08

## 2025-03-19 RX ADMIN — PREGABALIN 100 MG: 100 CAPSULE ORAL at 08:46

## 2025-03-19 RX ADMIN — Medication 5 MG: at 02:08

## 2025-03-19 RX ADMIN — Medication 3 ML: at 08:46

## 2025-03-19 RX ADMIN — HYDROCODONE BITARTRATE AND ACETAMINOPHEN 1 TABLET: 5; 325 TABLET ORAL at 20:05

## 2025-03-19 RX ADMIN — METHOCARBAMOL TABLETS 500 MG: 500 TABLET, COATED ORAL at 02:08

## 2025-03-19 NOTE — PLAN OF CARE
Goal Outcome Evaluation:  Plan of Care Reviewed With: patient        Progress: no change  Outcome Evaluation: vss. nvi. dressing CDI with dried drainage. x1 assist stand pivot. orthostatic hypotension. lucina drain in place. voiding fine. pain managed with PO and IV meds. plan to d/c home when medically stable.

## 2025-03-19 NOTE — PROGRESS NOTES
[facility] THORACIC/LUMBAR NEUROSURGERY PROGRESS NOTE      CC: POD # 2 s/p L2-4 laminectomy with fusion      Subjective     Interval History: Patient complaining of abdominal pain, reports no appetite.  He does report improvement in his back pain and right leg pain.      Objective     Vital signs in last 24 hours:  Temp:  [97.7 °F (36.5 °C)-98.1 °F (36.7 °C)] 97.7 °F (36.5 °C)  Heart Rate:  [72-95] 95  Resp:  [16] 16  BP: ()/(45-71) 144/65    Intake/Output this shift:  I/O this shift:  In: 360 [P.O.:360]  Out: 250 [Urine:250]  CJ- 80 cc/12 hrs    LABS:  Results from last 7 days   Lab Units 03/19/25  0346 03/18/25  0407   WBC 10*3/mm3 10.81* 13.31*   HEMOGLOBIN g/dL 10.2* 10.7*   HEMATOCRIT % 29.7* 32.5*   PLATELETS 10*3/mm3 164 172         IMAGING STUDIES:  No new imaging to review    I personally viewed the patient's chart, it was also reviewed by and discussed with Dr Luis Enrique Granado reviewed/changed: Yes  Tylenol 500 mg p.o. daily  Lipitor 10 mg p.o. nightly  Robaxin 500 mg p.o. every 8 hours  Metoprolol 25 mg p.o. nightly  Lyrica 100 mg p.o. twice daily  Aldactone 50 mg p.o. daily  Hydrocodone 5 mg 1 p.o. every 4 hours as needed  Morphine 1 mg IV every 2 hours as needed      Physical Exam:    General:  Awake & alert  Back:  Midline lumbar incision well-appearing, well-approximated with Steri-Strips in place.  No surrounding erythema, swelling or drainage.  CJ drain with small amount of bloody drainage.  Abdomen: Nontender, mildly distended  Motor:  Normal muscle strength, bulk and tone in lower extremities.   Sensation:  Normal to light touch bilateral LE's  Station and Gait:  Not tested  Extremities:  Wearing SCD's.  No calf tenderness or swelling bilaterally      Assessment & Plan     ASSESSMENT:      Spinal stenosis of lumbar region with neurogenic claudication    Essential hypertension    Hypercholesterolemia    POD # 2 s/p L2-4 laminectomy with fusion  Patient reporting abdominal discomfort and distention,  "abdominal KUB is pending.  He states he really has no appetite and is generally feeling weak.  He does report improvement in his back and right leg pain that he was having prior to surgery.  White count and hemoglobin stable today.  Patient needs encouragement to get up and get moving.  He states he tried working with PT yesterday but his legs gave out and he reported feeling lightheaded.  Patient is to continue to work with PT.    PLAN:   -LSO brace when OOB-Dianne notified  -CBC  -Encourage OOB as much as possible  -KUB      CJ Drain removal:     Stitch from lumbar CJ drain released and CJ drain removed without difficulty.  Tip intact.  Pressure dressing applied over CJ site and new dressing applied over surgical incision.   Patient tolerated well.      A LSO brace has been ordered due to diagnosis of lumbar stenosis s/p L2-4 fusion.  The purpose of the brace is to support weak muscles, stabilize and restrict movement of the trunk to aid in healing and pain relief of (fracture/ postop).      I discussed the patient's findings and my recommendations with patient, nursing staff, and Dr Dudley      During patient visit, I utilized appropriate personal protective equipment including gloves.  Appropriate PPE was worn during the entire visit.  Hand hygiene was completed before and after.      LOS: 2 days       Tena Graham, APRN  3/19/2025  09:39 EDT    \"Dictated utilizing Dragon dictation\".      "

## 2025-03-19 NOTE — PLAN OF CARE
[facility] [facility]  Progress Note Luz Vargas 1939, 81 y o  male MRN: [id number]  Unit/Bed#: [facility] 431-01 Encounter: [id number]  Primary Care Provider: Jennyfer Bey DO   Date and time admitted to hospital: 11/23/2022  4:04 AM    * Urethral trauma  Assessment & Plan  Urethral trauma status post cystoscopy with suprapubic catheter in place  Urology input noted  Stop Flomax as pt has SPC and cannot take capsules    Goals of care, counseling/discussion  Assessment & Plan  · 12/2:  · Pt frustrated by HTL and expressed interest in more comfort focused care, although he said his wife would want full cares  · Palliative appreciated- full code  Hopefully at rehab can advance to thin liquids  · 12/5:  · Pt w/ resp fail needing Bipap  · Now DNR/DNI  · Palliative appreciated  Tx focused and Bipap ok  But if was to fail Bipap make comfort care  · No PEG    Heart failure with mid-range ejection fraction [facility])  Assessment & Plan  Wt Readings from Last 3 Encounters:   12/04/22 68 3 kg (150 lb 9 2 oz)   11/23/22 74 5 kg (164 lb 3 9 oz)   11/22/22 70 8 kg (156 lb)     · Chronic HF  · Echo 03/01/2022 with EF 45%  · Continue beta-blocker and Aldactone  · IV Lasix -> Torsemide  · Now off diuretics due to JOEL  · Monitor I/O, daily weights  · Less than 2 g salt diet fluid restriction            HAP (hospital-acquired pneumonia)  Assessment & Plan  · Noted last night  · Cefepime/Vanco  · CXR WNL but clinically has HAP  · F/u U leg and U Strep and sp Cx and MRSA swab    Dysphagia  Assessment & Plan  · VBS cleared pt for puree/HTL  · Pt was upset about HTL  · Suspect HAP related to aspiration so pt now NPO  RN will try to give pills crushed w/ puree      · Speech will re-eval tomorrow  · Family would decline PEG if pt cannot tolerate PO     Acute-on-chronic kidney injury [facility])  Assessment & Plan  Lab Results   Component Value Date    EGFR 24 12/05/2022    EGFR 23 12/04/2022    EGFR 34 12/02/2022 Goal Outcome Evaluation:  Plan of Care Reviewed With: patient        Progress: no change  Outcome Evaluation: POD 2 Lumbar 2-4 fusion with instrumentation. Complaint of abdominal pain today, KUB negative for ileus/obstruction. Dressing changed, CJ drain removed. LSO ordered and at bedside, pt unable to tolerate due to incisional pain. Using urinal. BM today. Orthostatic hypotension. BP meds held. Regular diet, poor appetite, encouraging PO intake and protein supplement drink. PRN Norco. Plans for DC pending.                              CREATININE 2 41 (H) 12/05/2022    CREATININE 2 43 (H) 12/04/2022    CREATININE 1 77 (H) 12/02/2022     Estimated Creatinine Clearance: 22 8 mL/min (A) (by C-G formula based on SCr of 2 41 mg/dL (H))  Acute on chronic kidney disease improving with diuresis  Monitor kidney function closely  Avoid nephrotoxins  Suprapubic catheter in place  Today pt again noted to have JOEL due to sepsis  Check UA  Stop diuretics  Monitor for retention  Gentle D5W      Acute respiratory failure with hypoxia (HCC)  Assessment & Plan  · Noted last night  · Suspect HAP  · Needed Bipap but now on HF  · Pulm consult    Severe sepsis ([address] )  Assessment & Plan  · Noted last night  · E/b fever, leukocytosis, and LA  · LA resolved /w IVF  · F/u B Cx  · Procal high - trend  · Cefepime/Vanco    Hypernatremia  Assessment & Plan  · Gentle D5W    RSV (acute bronchiolitis due to respiratory syncytial virus)  Assessment & Plan  · Resp protocol  · Overall stable    PAD (peripheral artery disease) ([facility])  Assessment & Plan  · Seen on LEADS  · Vascular appreciated  · C/w med mgmt w/ Eliquis, Plavix, and statin  · LDL 33  · OP f/u w/ vascular to assess R foot healing    Deep tissue injury  Assessment & Plan  · Wound care appreciated  · Podiatry appreciated  · Right foot Xray no osteo - ok to WBAT    Ambulatory dysfunction  Assessment & Plan  Multifactorial with deconditioning contributing  Safe ambulation  Fall precautions  Physical therapy    Hematuria  Assessment & Plan  · Due to urethral injury  · Hematuria resolved    Transaminitis  Assessment & Plan  · Likely due to shock liver  · Improved - chronically high bili      Closed fracture of trochanter of right femur with routine healing  Assessment & Plan  · Discharged from [address] [place] [address] on 11/06 s/p repair     · DVT prophylaxis w/ Eliquis  · Physical therapy    Acute on chronic anemia  Assessment & Plan  Lab Results   Component Value Date    EGFR 24 12/05/2022    EGFR 23 12/04/2022    EGFR 34 12/02/2022 CREATININE 2 41 (H) 2022    CREATININE 2 43 (H) 2022    CREATININE 1 77 (H) 2022     · Multifactorial  · Iron studies noted  · Iron supplementation  · Monitor hemoglobin    Chronic atrial fibrillation (HCC)  Assessment & Plan  Continue amiodarone, beta-blocker  Eliquis for anticoagulation    CAD (coronary artery disease)  Assessment & Plan  Continue Plavix, beta-blocker, Lipitor      VTE Pharmacologic Prophylaxis: VTE Score: 21 High Risk (Score >/= 5) - Pharmacological DVT Prophylaxis Ordered: apixaban (Eliquis)  Sequential Compression Devices Ordered  Patient Centered Rounds: I performed bedside rounds with nursing staff today  Discussions with Specialists or Other Care Team Provider: pulm and palliative    Education and Discussions with Family / Patient: Updated  (wife and daughter) at bedside  Time Spent for Care: 45 minutes  More than 50% of total time spent on counseling and coordination of care as described above  Current Length of Stay: 12 day(s)  Current Patient Status: Inpatient   Certification Statement: The patient will continue to require additional inpatient hospital stay due to need for HF O2  Discharge Plan: Anticipate discharge in >72 hrs to rehab facility  Code Status: Level 3 - DNAR and DNI    Subjective:   Pt SOB last night requiring Bipap    Objective:     Vitals:   Temp (24hrs), Av °F (38 3 °C), Min:97 2 °F (36 2 °C), Max:102 8 °F (39 3 °C)    Temp:  [97 2 °F (36 2 °C)-102 8 °F (39 3 °C)] 97 2 °F (36 2 °C)  HR:  [] 98  Resp:  [16] 16  BP: ()/() 90/50  SpO2:  [88 %-100 %] 92 %  Body mass index is 22 89 kg/m²  Input and Output Summary (last 24 hours): Intake/Output Summary (Last 24 hours) at 2022 1544  Last data filed at 2022 1352  Gross per 24 hour   Intake --   Output 500 ml   Net -500 ml       Physical Exam:   Physical Exam  HENT:      Head: Normocephalic and atraumatic        Nose: Nose normal  Mouth/Throat:      Mouth: Mucous membranes are moist    Eyes:      Extraocular Movements: Extraocular movements intact  Conjunctiva/sclera: Conjunctivae normal    Cardiovascular:      Rate and Rhythm: Normal rate and regular rhythm  Pulmonary:      Effort: Pulmonary effort is normal       Breath sounds: Normal breath sounds  Comments: Decreased BS  Abdominal:      General: Bowel sounds are normal       Palpations: Abdomen is soft  Musculoskeletal:         General: Normal range of motion  Cervical back: Normal range of motion and neck supple  Right lower leg: No edema  Left lower leg: No edema  Skin:     General: Skin is warm and dry  Neurological:      Mental Status: He is alert and oriented to person, place, and time           Additional Data:     Labs:  Results from last 7 days   Lab Units 12/05/22  0505   WBC Thousand/uL 13 72*   HEMOGLOBIN g/dL 7 6*   HEMATOCRIT % 26 3*   PLATELETS Thousands/uL 158   NEUTROS PCT % 91*   LYMPHS PCT % 2*   MONOS PCT % 6   EOS PCT % 0     Results from last 7 days   Lab Units 12/05/22  0505   SODIUM mmol/L 148*   POTASSIUM mmol/L 4 2   CHLORIDE mmol/L 114*   CO2 mmol/L 28   BUN mg/dL 56*   CREATININE mg/dL 2 41*   ANION GAP mmol/L 6   CALCIUM mg/dL 8 1*   ALBUMIN g/dL 1 7*   TOTAL BILIRUBIN mg/dL 1 44*   ALK PHOS U/L 120*   ALT U/L 13   AST U/L 20   GLUCOSE RANDOM mg/dL 101                 Results from last 7 days   Lab Units 12/05/22  0505 12/04/22  2325   LACTIC ACID mmol/L 1 6 2 2*   PROCALCITONIN ng/ml 2 00* 1 09*       Lines/Drains:  Invasive Devices     Peripheral Intravenous Line  Duration           Peripheral IV 12/04/22 Proximal;Right;Ventral (anterior) Forearm 1 day          Drain  Duration           Suprapubic Catheter 18 Fr  12 days                      Imaging: Reviewed radiology reports from this admission including: chest xray    Recent Cultures (last 7 days):   Results from last 7 days   Lab Units 12/04/22  2324   BLOOD CULTURE Received in Microbiology Lab  Culture in Progress  Received in Microbiology Lab  Culture in Progress  Last 24 Hours Medication List:   Current Facility-Administered Medications   Medication Dose Route Frequency Provider Last Rate   • acetaminophen  650 mg Rectal Q6H PRN Raz Connell PA-C     • albuterol  2 5 mg Nebulization Q6H PRN Raz Connell PA-C     • amiodarone  200 mg Oral Daily With Breakfast Tamra Staley MD     • apixaban  2 5 mg Oral BID Juliocesar Vallejo DO     • atorvastatin  40 mg Oral Daily With Kamryn Carpenter MD     • cefepime  2,000 mg Intravenous Q24H Raz Connell PA-C 2,000 mg (12/05/22 0053)   • clopidogrel  75 mg Oral Daily Juliocesar Vallejo DO     • dextrose  35 mL/hr Intravenous Continuous Angeline Alpers, DO 35 mL/hr (12/05/22 0930)   • doxylamine  12 5 mg Oral HS PRN Raz Connell PA-C     • ferrous sulfate  325 mg Oral Daily With Breakfast Parul Gabriel MD     • HYDROmorphone  0 2 mg Intravenous Q4H PRN Nicole Enciso DO     • ipratropium  0 5 mg Nebulization Q6H PRN Raz Connell PA-C     • Lidocaine Viscous HCl  15 mL Swish & Spit 4x Daily PRN Angeline Alpers, DO     • LORazepam  0 5 mg Intravenous Q6H PRN Nicole Enciso DO     • melatonin  6 mg Oral HS Swathi Swathi Davila PA-C     • metoprolol tartrate  25 mg Oral Q12H [address] Parul Gabriel MD     • saliva substitute  5 spray Mouth/Throat 4x Daily PRN Alexei Edward MD     • [START ON 12/6/2022] vancomycin  15 mg/kg Intravenous Daily PRN Raz Connell PA-C          Today, Patient Was Seen By: Angeline Alpers, DO    **Please Note: This note may have been constructed using a voice recognition system  **

## 2025-03-19 NOTE — PROGRESS NOTES
"DAILY PROGRESS NOTE  [facility]    Patient Identification:  Name: Olu Izquierdo  Age: 84 y.o.  Sex: male  :  1940  MRN: [id number]         Primary Care Physician: Edenilson Henry MD    Subjective:  Interval History: He complains of some back pain and has had some problems with some orthostatic hypotension.    Objective:    Scheduled Meds:acetaminophen, 500 mg, Oral, Daily  atorvastatin, 10 mg, Oral, Nightly  ceFAZolin, 2,000 mg, Intravenous, Q8H  [Held by provider] hydrALAZINE, 25 mg, Oral, BID  [Held by provider] hydroCHLOROthiazide, 12.5 mg, Oral, Daily  melatonin, 10 mg, Oral, Nightly  methocarbamol, 500 mg, Oral, Q8H  [Held by provider] metoprolol succinate XL, 25 mg, Oral, Nightly  pregabalin, 100 mg, Oral, BID  sodium chloride, 3 mL, Intravenous, Q12H  [Held by provider] spironolactone, 50 mg, Oral, Daily      Continuous Infusions:     Vital signs in last 24 hours:  Temp:  [97.7 °F (36.5 °C)-98.1 °F (36.7 °C)] 98 °F (36.7 °C)  Heart Rate:  [65-95] 65  Resp:  [16] 16  BP: (113-160)/(59-71) 113/59    Intake/Output:    Intake/Output Summary (Last 24 hours) at 3/19/2025 1429  Last data filed at 3/19/2025 0847  Gross per 24 hour   Intake 360 ml   Output 1520 ml   Net -1160 ml       Exam:  /59 (BP Location: Left arm, Patient Position: Lying)   Pulse 65   Temp 98 °F (36.7 °C) (Oral)   Resp 16   Ht 182.9 cm (72\")   Wt 88 kg (194 lb 0.1 oz)   SpO2 99%   BMI 26.31 kg/m²     General Appearance:    Alert, cooperative, no distress   Head:    Normocephalic, without obvious abnormality, atraumatic   Eyes:       Throat:   Lips, tongue, gums normal   Neck:   Supple, symmetrical, trachea midline, no JVD   Lungs:     Clear to auscultation bilaterally, respirations unlabored   Chest Wall:    No tenderness or deformity    Heart:    Regular rate and rhythm, S1 and S2 normal, no murmur,no  rub or gallop   Abdomen:     Soft, nontender, bowel sounds active, no masses, no organomegaly  "   Extremities:   Extremities normal, atraumatic, no cyanosis or edema   Pulses:      Skin:   Skin is warm and dry,  no rashes or palpable lesions   Neurologic:   no focal deficits noted      Lab Results (last 72 hours)       Procedure Component Value Units Date/Time    Basic Metabolic Panel [[id number]]  (Abnormal) Collected: 03/19/25 0346    Specimen: Blood Updated: 03/19/25 0450     Glucose 116 mg/dL      BUN 11 mg/dL      Creatinine 0.82 mg/dL      Sodium 133 mmol/L      Potassium 4.1 mmol/L      Chloride 99 mmol/L      CO2 24.0 mmol/L      Calcium 8.4 mg/dL      BUN/Creatinine Ratio 13.4     Anion Gap 10.0 mmol/L      eGFR 87.2 mL/min/1.73     Narrative:      GFR Categories in Chronic Kidney Disease (CKD)      GFR Category          GFR (mL/min/1.73)    Interpretation  G1                     90 or greater         Normal or high (1)  G2                      60-89                Mild decrease (1)  G3a                   45-59                Mild to moderate decrease  G3b                   30-44                Moderate to severe decrease  G4                    15-29                Severe decrease  G5                    14 or less           Kidney failure          (1)In the absence of evidence of kidney disease, neither GFR category G1 or G2 fulfill the criteria for CKD.    eGFR calculation 2021 CKD-EPI creatinine equation, which does not include race as a factor    CBC & Differential [[id number]]  (Abnormal) Collected: 03/19/25 0346    Specimen: Blood Updated: 03/19/25 0434    Narrative:      The following orders were created for panel order CBC & Differential.  Procedure                               Abnormality         Status                     ---------                               -----------         ------                     CBC Auto Differential[[id number]]        Abnormal            Final result                 Please view results for these tests on the individual orders.    CBC Auto Differential [[id number]]   (Abnormal) Collected: 03/19/25 0346    Specimen: Blood Updated: 03/19/25 0434     WBC 10.81 10*3/mm3      RBC 3.18 10*6/mm3      Hemoglobin 10.2 g/dL      Hematocrit 29.7 %      MCV 93.4 fL      MCH 32.1 pg      MCHC 34.3 g/dL      RDW 11.7 %      RDW-SD 39.6 fl      MPV 9.5 fL      Platelets 164 10*3/mm3      Neutrophil % 69.1 %      Lymphocyte % 12.3 %      Monocyte % 16.6 %      Eosinophil % 1.1 %      Basophil % 0.3 %      Immature Grans % 0.6 %      Neutrophils, Absolute 7.48 10*3/mm3      Lymphocytes, Absolute 1.33 10*3/mm3      Monocytes, Absolute 1.79 10*3/mm3      Eosinophils, Absolute 0.12 10*3/mm3      Basophils, Absolute 0.03 10*3/mm3      Immature Grans, Absolute 0.06 10*3/mm3      nRBC 0.0 /100 WBC     POC Glucose Once [[id number]]  (Abnormal) Collected: 03/18/25 1259    Specimen: Blood Updated: 03/18/25 1305     Glucose 141 mg/dL     Basic Metabolic Panel [[id number]]  (Abnormal) Collected: 03/18/25 0812    Specimen: Blood Updated: 03/18/25 0843     Glucose 130 mg/dL      BUN 14 mg/dL      Creatinine 1.02 mg/dL      Sodium 134 mmol/L      Potassium 4.5 mmol/L      Chloride 101 mmol/L      CO2 25.0 mmol/L      Calcium 8.4 mg/dL      BUN/Creatinine Ratio 13.7     Anion Gap 8.0 mmol/L      eGFR 72.9 mL/min/1.73     Narrative:      GFR Categories in Chronic Kidney Disease (CKD)      GFR Category          GFR (mL/min/1.73)    Interpretation  G1                     90 or greater         Normal or high (1)  G2                      60-89                Mild decrease (1)  G3a                   45-59                Mild to moderate decrease  G3b                   30-44                Moderate to severe decrease  G4                    15-29                Severe decrease  G5                    14 or less           Kidney failure          (1)In the absence of evidence of kidney disease, neither GFR category G1 or G2 fulfill the criteria for CKD.    eGFR calculation 2021 CKD-EPI creatinine equation, which does not  "include race as a factor    CBC & Differential [[id number]]  (Abnormal) Collected: 03/18/25 0407    Specimen: Blood from Arm, Right Updated: 03/18/25 0446    Narrative:      The following orders were created for panel order CBC & Differential.  Procedure                               Abnormality         Status                     ---------                               -----------         ------                     CBC Auto Differential[[id number]]        Abnormal            Final result                 Please view results for these tests on the individual orders.    CBC Auto Differential [[id number]]  (Abnormal) Collected: 03/18/25 0407    Specimen: Blood from Arm, Right Updated: 03/18/25 0446     WBC 13.31 10*3/mm3      RBC 3.33 10*6/mm3      Hemoglobin 10.7 g/dL      Hematocrit 32.5 %      MCV 97.6 fL      MCH 32.1 pg      MCHC 32.9 g/dL      RDW 12.0 %      RDW-SD 43.4 fl      MPV 9.6 fL      Platelets 172 10*3/mm3      Neutrophil % 68.0 %      Lymphocyte % 14.6 %      Monocyte % 16.1 %      Eosinophil % 0.5 %      Basophil % 0.2 %      Immature Grans % 0.6 %      Neutrophils, Absolute 9.05 10*3/mm3      Lymphocytes, Absolute 1.94 10*3/mm3      Monocytes, Absolute 2.14 10*3/mm3      Eosinophils, Absolute 0.07 10*3/mm3      Basophils, Absolute 0.03 10*3/mm3      Immature Grans, Absolute 0.08 10*3/mm3      nRBC 0.0 /100 WBC           Data Review:  Results from last 7 days   Lab Units 03/19/25  0346 03/18/25  0812   SODIUM mmol/L 133* 134*   POTASSIUM mmol/L 4.1 4.5   CHLORIDE mmol/L 99 101   CO2 mmol/L 24.0 25.0   BUN mg/dL 11 14   CREATININE mg/dL 0.82 1.02   GLUCOSE mg/dL 116* 130*   CALCIUM mg/dL 8.4* 8.4*     Results from last 7 days   Lab Units 03/19/25  0346 03/18/25 0407   WBC 10*3/mm3 10.81* 13.31*   HEMOGLOBIN g/dL 10.2* 10.7*   HEMATOCRIT % 29.7* 32.5*   PLATELETS 10*3/mm3 164 172             No results found for: \"TROPONINT\"            Invalid input(s): \"PROT\", \"LABALBU\"          Glucose   Date/Time Value " "Ref Range Status   03/18/2025 1259 141 (H) 70 - 130 mg/dL Final           Past Medical History:   Diagnosis Date    Anesthesia complication     HAD DIFFICULTY URINATING AFTER SURGERY    Arthritis     Hyperlipidemia     Hypertension     Injury of back     Back surgery 1984 partial discectomy with \"sciatic nerve damage\"    Low back pain     Neuropathy     BILAT FEET    Numbness     left knee    Spinal stenosis     LUMBAR       Assessment:  Active Hospital Problems    Diagnosis  POA    **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       Plan:  Continue with postop care and holding blood pressure medicine for low blood pressure.  Follow-up on labs.  DC planning.  May need SNU for rehab if he does not get any stronger.    Jarocho Campos MD  3/19/2025  14:29 EDT   "

## 2025-03-19 NOTE — PLAN OF CARE
Goal Outcome Evaluation:  Plan of Care Reviewed With: patient        Progress: no change  Outcome Evaluation: Pt seen for PT tx this PM and tolerated the session fairly. Today, pt was [facility] for log rol/bed mobility, [facility] for STS to RW and [facility] for one L side step toward HOB w/ RW. During side step pt had a bilateral knee buckle requiring assist to safely sit EOB. Pt denied having any dizziness, lightheadedness or visual changes. Pts BP was taken as follows: supine 147/73, sitting 116/58 and standing 81/51; RN aware. Pts LSO brace arrived during the session and attempted to don while seated EOB. Pt was unable to tolerate the brace touching his back, at his incision, despite having a layer inbetween. Encouraged pt to eat/drink more as he states he has been unable to tolerate much, and  to perform LE ther-ex while supine; he v/u. Discussed w/ RN. PT will cont to follow and rec SNF at ID.    Anticipated Discharge Disposition (PT): skilled nursing facility

## 2025-03-19 NOTE — PLAN OF CARE
Goal Outcome Evaluation:  Plan of Care Reviewed With: patient        Progress: no change  Outcome Evaluation: POD 1 Lumbar 2-4 fusion with instrumentation. Dressing with moderate drainage. CJ drain had 230 ml output this shift. Scott dc'd, voiding function intact. Using urinal. RA. BPs stable while laying, drop while standing with associated dizziness/paleness/BLE weakness. BP meds held. 250 ml bolus. Regular diet, poor appetite. IV ancef. PRN Norco and morphine. Plans for DC pending.

## 2025-03-19 NOTE — THERAPY TREATMENT NOTE
"Patient Name: Olu Izquierdo  : 1940    MRN: [id number]                              Today's Date: 3/19/2025       Admit Date: 3/17/2025    Visit Dx:     ICD-10-CM ICD-9-CM   1. Spinal stenosis of lumbar region with neurogenic claudication  M48.062 724.03     Patient Active Problem List   Diagnosis    Essential hypertension    Hypercholesterolemia    Lower urinary tract infectious disease    Spinal stenosis of lumbar region with neurogenic claudication    Mild episode of recurrent major depressive disorder    Neuropathy     Past Medical History:   Diagnosis Date    Anesthesia complication     HAD DIFFICULTY URINATING AFTER SURGERY    Arthritis     Hyperlipidemia     Hypertension     Injury of back     Back surgery 1984 partial discectomy with \"sciatic nerve damage\"    Low back pain     Neuropathy     BILAT FEET    Numbness     left knee    Spinal stenosis     LUMBAR     Past Surgical History:   Procedure Laterality Date    CATARACT EXTRACTION, BILATERAL      EPIDURAL BLOCK      EYE SURGERY      LUMBAR DISCECTOMY      LUMBAR DISCECTOMY Right 2019    Procedure: Right Lumbar two-three laminectomy with metrix;  Surgeon: Luis Dudley MD;  Location: [facility];  Service: Neurosurgery    LUMBAR DISCECTOMY FUSION INSTRUMENTATION N/A 3/17/2025    Procedure: Lumbar 2 to lumbar 3 and lumbar 3 to lumbar 4 laminectomy with neurolysis with fusion and instrumentation;  Surgeon: Luis Dudley MD;  Location: [facility];  Service: Neurosurgery;  Laterality: N/A;      General Information       Row Name 25 1557          Physical Therapy Time and Intention    Document Type therapy note (daily note)  -MG     Mode of Treatment individual therapy;physical therapy  -MG       Row Name 25 1557          General Information    Patient Profile Reviewed yes  -MG     Existing Precautions/Restrictions fall;orthostatic hypotension;spinal;LSO;brace worn when out of bed  -MG     Barriers to Rehab none " identified  -MG       Row Name 03/19/25 1557          Cognition    Orientation Status (Cognition) oriented x 4  -MG       Row Name 03/19/25 1557          Safety Issues/Impairments Affecting Functional Mobility    Safety Issues Affecting Function (Mobility) insight into deficits/self-awareness;sequencing abilities  -MG     Impairments Affecting Function (Mobility) balance;endurance/activity tolerance;strength;pain  -MG     Comment, Safety Issues/Impairments (Mobility) Gait belt and non-skid socks donned. PT tech present for safe functional mobility.  -MG               User Key  (r) = Recorded By, (t) = Taken By, (c) = Cosigned By      Initials Name Provider Type    MG Jerica Wilburn, PT Physical Therapist                   Mobility       Row Name 03/19/25 1557          Bed Mobility    Bed Mobility supine-sit;sit-supine  -MG     Supine-Sit [place] (Bed Mobility) minimum assist (75% patient effort);verbal cues  -MG     Sit-Supine [place] (Bed Mobility) minimum assist (75% patient effort);verbal cues  -MG     Assistive Device (Bed Mobility) head of bed elevated;bed rails  -MG     Comment, (Bed Mobility) Log roll. Cues for sequencing and hand placement. Assist at trunk to tsf into sitting and LEs to return to sidelying.  -MG       Row Name 03/19/25 1557          Sit-Stand Transfer    Sit-Stand [place] (Transfers) minimum assist (75% patient effort);verbal cues  -MG     Assistive Device (Sit-Stand Transfers) walker, front-wheeled  -MG     Comment, (Sit-Stand Transfer) x1 from EOB. Cues for hand placement.  -MG       Row Name 03/19/25 1557          Gait/Stairs (Locomotion)    [place] Level (Gait) minimum assist (75% patient effort);verbal cues  -MG     Assistive Device (Gait) walker, front-wheeled  -MG     Distance in Feet (Gait) --  1 side step  -MG     Deviations/Abnormal Patterns (Gait) gait speed decreased;base of support, narrow;stride length decreased  -MG     Bilateral Gait Deviations forward  flexed posture;knee buckling bilaterally  -MG     Comment, (Gait/Stairs) One side step towards HOB. Both knees buckled and was safely lowered to sitting EOB. Pt denied have any dizziness or visual changes.  -MG               User Key  (r) = Recorded By, (t) = Taken By, (c) = Cosigned By      Initials Name Provider Type    Jerica Huang, PT Physical Therapist                   Obj/Interventions       Row Name 03/19/25 1601          Balance    Comment, Balance Sitting EOB with SBA. LSO brace arrived, however pt could not tolerate it touching his incision and declined to wear it.  -MG               User Key  (r) = Recorded By, (t) = Taken By, (c) = Cosigned By      Initials Name Provider Type    Jerica Haung, PT Physical Therapist                   Goals/Plan    No documentation.                  Clinical Impression       Row Name 03/19/25 1602          Pain    Pretreatment Pain Rating 5/10  -MG     Posttreatment Pain Rating 8/10  -MG     Pain Location back  -MG     Pain Side/Orientation bilateral;lower  -MG     Pain Management Interventions premedicated for activity;positioning techniques utilized  -MG     Response to Pain Interventions activity participation with increased pain  -MG     Pre/Posttreatment Pain Comment Sensitive to touch around incision. Was unable to tolerate his LSO at his back.  -MG       Row Name 03/19/25 1602          Plan of Care Review    Plan of Care Reviewed With patient  -MG     Progress no change  -MG     Outcome Evaluation Pt seen for PT tx this PM and tolerated the session fairly. Today, pt was Faiza for log rol/bed mobility, Faiza for STS to RW and Faiza for one L side step toward HOB w/ RW. During side step pt had a bilateral knee buckle requiring assist to safely sit EOB. Pt denied having any dizziness, lightheadedness or visual changes. Pts BP was taken as follows: supine 147/73, sitting 116/58 and standing 81/51; RN aware. Pts LSO brace arrived during the session and attempted  to don while seated EOB. Pt was unable to tolerate the brace touching his back, at his incision, despite having a layer inbetween. Encouraged pt to eat/drink more as he states he has been unable to tolerate much, and  to perform LE ther-ex while supine; he v/u. Discussed w/ RN. PT will cont to follow and rec SNF at [place].  -MG       Row Name 03/19/25 1602          Therapy Assessment/Plan (PT)    Rehab Potential (PT) good  -MG     Criteria for Skilled Interventions Met (PT) yes  -MG     Therapy Frequency (PT) 6 times/wk  -MG       Row Name 03/19/25 1602          Vital Signs    Pre Systolic BP Rehab 147  map 93. supine  -MG     Pre Treatment Diastolic BP 73  -MG     Intra Systolic BP Rehab 116  map 75. sitting  -MG     Intra Treatment Diastolic BP 58  -MG     Post Systolic BP Rehab 81  map 58. standing  -MG     Post Treatment Diastolic BP 51  -MG     Pretreatment Heart Rate (beats/min) 101  -MG     Intratreatment Heart Rate (beats/min) 101  -MG     Posttreatment Heart Rate (beats/min) 122  -MG     Pre Patient Position Supine  -MG     Intra Patient Position Sitting  -MG     Post Patient Position Supine  -MG       Row Name 03/19/25 1602          Positioning and Restraints    Pre-Treatment Position in bed  -MG     Post Treatment Position bed  -MG     In Bed notified nsg;side lying left;fowlers;call light within reach;encouraged to call for assist;exit alarm on;side rails up x3;SCD pump applied  -MG               User Key  (r) = Recorded By, (t) = Taken By, (c) = Cosigned By      Initials Name Provider Type    MG Jerica Wilburn, PT Physical Therapist                   Outcome Measures       Row Name 03/19/25 1608 03/19/25 0863       How much help from another person do you currently need...    Turning from your back to your side while in flat bed without using bedrails? 3  -MG 3  -OD    Moving from lying on back to sitting on the side of a flat bed without bedrails? 3  -MG 3  -OD    Moving to and from a bed to a chair  (including a wheelchair)? 3  -MG 3  -OD    Standing up from a chair using your arms (e.g., wheelchair, bedside chair)? 3  -MG 3  -OD    Climbing 3-5 steps with a railing? 2  -MG 2  -OD    To walk in hospital room? 3  -MG 3  -OD    AM-PAC 6 Clicks Score (PT) 17  -MG 17  -OD    Highest Level of Mobility Goal 5 --> Static standing  -MG 5 --> Static standing  -OD              User Key  (r) = Recorded By, (t) = Taken By, (c) = Cosigned By      Initials Name Provider Type    MG Jerica Wilburn, PT Physical Therapist    OD Nusrat Henriquez, RN Registered Nurse                                 Physical Therapy Education       Title: PT OT SLP Therapies (Done)       Topic: Physical Therapy (Done)       Point: Mobility training (Done)       Learning Progress Summary            Patient Acceptance, E, VU,NR by  at 3/19/2025 1608    Acceptance, E, VU by  at 3/18/2025 1143                      Point: Home exercise program (Done)       Learning Progress Summary            Patient Acceptance, E, VU,NR by  at 3/19/2025 1608    Acceptance, E, VU by  at 3/18/2025 1143                      Point: Body mechanics (Done)       Learning Progress Summary            Patient Acceptance, E, VU,NR by  at 3/19/2025 1608    Acceptance, E, VU by  at 3/18/2025 1143                      Point: Precautions (Done)       Learning Progress Summary            Patient Acceptance, E, VU,NR by  at 3/19/2025 1608    Acceptance, E, VU by  at 3/18/2025 1143                                      User Key       Initials Effective Dates Name Provider Type Discipline     05/24/22 -  Jerica Wilburn, PT Physical Therapist PT     05/02/22 -  Kianna Lira, PT Physical Therapist PT                  PT Recommendation and Plan     Progress: no change  Outcome Evaluation: Pt seen for PT tx this PM and tolerated the session fairly. Today, pt was Fa[place] for log rol/bed mobility, Faiza for STS to RW and [place] for one L side step toward HOB w/ RW. During side  step pt had a bilateral knee buckle requiring assist to safely sit EOB. Pt denied having any dizziness, lightheadedness or visual changes. Pts BP was taken as follows: supine 147/73, sitting 116/58 and standing 81/51; RN aware. Pts LSO brace arrived during the session and attempted to don while seated EOB. Pt was unable to tolerate the brace touching his back, at his incision, despite having a layer inbetween. Encouraged pt to eat/drink more as he states he has been unable to tolerate much, and  to perform LE ther-ex while supine; he v/u. Discussed w/ RN. PT will cont to follow and rec SNF at [place].     Time Calculation:         PT Charges       Row Name 03/19/25 1608             Time Calculation    Start Time 1403  -MG      Stop Time 1427  -MG      Time Calculation (min) 24 min  -MG      PT Received On 03/19/25  -MG      PT - Next Appointment 03/20/25  -MG                User Key  (r) = Recorded By, (t) = Taken By, (c) = Cosigned By      Initials Name Provider Type    MG Jerica Wilburn, PT Physical Therapist                  Therapy Charges for Today       Code Description Service Date Service Provider Modifiers Qty    78124086890 HC PT THERAPEUTIC ACT EA 15 MIN 3/19/2025 Jerica Wilburn, PT GP 2    88060433113 HC PT THER SUPP EA 15 MIN 3/19/2025 Jerica Wilburn, PT GP 2            PT G-Codes  Outcome Measure Options: AM-PAC 6 Clicks Basic Mobility (PT)  AM-PAC 6 Clicks Score (PT): 17  PT Discharge Summary  Anticipated Discharge Disposition (PT): skilled nursing facility    Jerica Wilburn PT  3/19/2025

## 2025-03-20 LAB
ANION GAP SERPL CALCULATED.3IONS-SCNC: 11.7 MMOL/L (ref 5–15)
BASOPHILS # BLD AUTO: 0.04 10*3/MM3 (ref 0–0.2)
BASOPHILS NFR BLD AUTO: 0.3 % (ref 0–1.5)
BUN SERPL-MCNC: 12 MG/DL (ref 8–23)
BUN/CREAT SERPL: 16.4 (ref 7–25)
CALCIUM SPEC-SCNC: 8.8 MG/DL (ref 8.6–10.5)
CHLORIDE SERPL-SCNC: 96 MMOL/L (ref 98–107)
CO2 SERPL-SCNC: 26.3 MMOL/L (ref 22–29)
CREAT SERPL-MCNC: 0.73 MG/DL (ref 0.76–1.27)
DEPRECATED RDW RBC AUTO: 38.8 FL (ref 37–54)
EGFRCR SERPLBLD CKD-EPI 2021: 90.3 ML/MIN/1.73
EOSINOPHIL # BLD AUTO: 0.13 10*3/MM3 (ref 0–0.4)
EOSINOPHIL NFR BLD AUTO: 0.9 % (ref 0.3–6.2)
ERYTHROCYTE [DISTWIDTH] IN BLOOD BY AUTOMATED COUNT: 11.4 % (ref 12.3–15.4)
GLUCOSE SERPL-MCNC: 124 MG/DL (ref 65–99)
HCT VFR BLD AUTO: 30.2 % (ref 37.5–51)
HGB BLD-MCNC: 10.3 G/DL (ref 13–17.7)
IMM GRANULOCYTES # BLD AUTO: 0.14 10*3/MM3 (ref 0–0.05)
IMM GRANULOCYTES NFR BLD AUTO: 1 % (ref 0–0.5)
LYMPHOCYTES # BLD AUTO: 1.55 10*3/MM3 (ref 0.7–3.1)
LYMPHOCYTES NFR BLD AUTO: 10.6 % (ref 19.6–45.3)
MCH RBC QN AUTO: 31.8 PG (ref 26.6–33)
MCHC RBC AUTO-ENTMCNC: 34.1 G/DL (ref 31.5–35.7)
MCV RBC AUTO: 93.2 FL (ref 79–97)
MONOCYTES # BLD AUTO: 2.19 10*3/MM3 (ref 0.1–0.9)
MONOCYTES NFR BLD AUTO: 14.9 % (ref 5–12)
NEUTROPHILS NFR BLD AUTO: 10.61 10*3/MM3 (ref 1.7–7)
NEUTROPHILS NFR BLD AUTO: 72.3 % (ref 42.7–76)
NRBC BLD AUTO-RTO: 0 /100 WBC (ref 0–0.2)
PLATELET # BLD AUTO: 199 10*3/MM3 (ref 140–450)
PMV BLD AUTO: 9.5 FL (ref 6–12)
POTASSIUM SERPL-SCNC: 4 MMOL/L (ref 3.5–5.2)
RBC # BLD AUTO: 3.24 10*6/MM3 (ref 4.14–5.8)
SODIUM SERPL-SCNC: 134 MMOL/L (ref 136–145)
WBC NRBC COR # BLD AUTO: 14.66 10*3/MM3 (ref 3.4–10.8)

## 2025-03-20 PROCEDURE — 25810000003 SODIUM CHLORIDE 0.9 % SOLUTION: Performed by: NURSE PRACTITIONER

## 2025-03-20 PROCEDURE — 99024 POSTOP FOLLOW-UP VISIT: CPT | Performed by: NURSE PRACTITIONER

## 2025-03-20 PROCEDURE — 25010000002 VANCOMYCIN 10 G RECONSTITUTED SOLUTION: Performed by: NURSE PRACTITIONER

## 2025-03-20 PROCEDURE — 85025 COMPLETE CBC W/AUTO DIFF WBC: CPT | Performed by: STUDENT IN AN ORGANIZED HEALTH CARE EDUCATION/TRAINING PROGRAM

## 2025-03-20 PROCEDURE — 25810000003 SODIUM CHLORIDE 0.9 % SOLUTION 250 ML FLEX CONT: Performed by: NURSE PRACTITIONER

## 2025-03-20 PROCEDURE — 97116 GAIT TRAINING THERAPY: CPT

## 2025-03-20 PROCEDURE — 25010000002 VANCOMYCIN 1 G RECONSTITUTED SOLUTION 1 EACH VIAL: Performed by: NURSE PRACTITIONER

## 2025-03-20 PROCEDURE — 80048 BASIC METABOLIC PNL TOTAL CA: CPT | Performed by: STUDENT IN AN ORGANIZED HEALTH CARE EDUCATION/TRAINING PROGRAM

## 2025-03-20 PROCEDURE — 97530 THERAPEUTIC ACTIVITIES: CPT

## 2025-03-20 RX ORDER — SODIUM CHLORIDE 9 MG/ML
125 INJECTION, SOLUTION INTRAVENOUS ONCE
Status: COMPLETED | OUTPATIENT
Start: 2025-03-20 | End: 2025-03-20

## 2025-03-20 RX ADMIN — SODIUM CHLORIDE 125 ML/HR: 0.9 INJECTION, SOLUTION INTRAVENOUS at 11:01

## 2025-03-20 RX ADMIN — METHOCARBAMOL TABLETS 500 MG: 500 TABLET, COATED ORAL at 11:01

## 2025-03-20 RX ADMIN — METHOCARBAMOL TABLETS 500 MG: 500 TABLET, COATED ORAL at 17:41

## 2025-03-20 RX ADMIN — Medication 10 MG: at 20:45

## 2025-03-20 RX ADMIN — HYDROCODONE BITARTRATE AND ACETAMINOPHEN 1 TABLET: 5; 325 TABLET ORAL at 16:34

## 2025-03-20 RX ADMIN — PREGABALIN 100 MG: 100 CAPSULE ORAL at 20:44

## 2025-03-20 RX ADMIN — Medication 3 ML: at 20:45

## 2025-03-20 RX ADMIN — PREGABALIN 100 MG: 100 CAPSULE ORAL at 08:30

## 2025-03-20 RX ADMIN — HYDROCODONE BITARTRATE AND ACETAMINOPHEN 1 TABLET: 5; 325 TABLET ORAL at 08:32

## 2025-03-20 RX ADMIN — SODIUM CHLORIDE 1000 MG: 9 INJECTION, SOLUTION INTRAVENOUS at 22:15

## 2025-03-20 RX ADMIN — SODIUM CHLORIDE 1750 MG: 9 INJECTION, SOLUTION INTRAVENOUS at 11:01

## 2025-03-20 RX ADMIN — HYDROCODONE BITARTRATE AND ACETAMINOPHEN 1 TABLET: 5; 325 TABLET ORAL at 23:32

## 2025-03-20 RX ADMIN — ACETAMINOPHEN 500 MG: 500 TABLET, FILM COATED ORAL at 08:30

## 2025-03-20 RX ADMIN — METHOCARBAMOL TABLETS 500 MG: 500 TABLET, COATED ORAL at 05:11

## 2025-03-20 RX ADMIN — ZOLPIDEM TARTRATE 5 MG: 5 TABLET, FILM COATED ORAL at 22:16

## 2025-03-20 RX ADMIN — ATORVASTATIN CALCIUM 10 MG: 20 TABLET, FILM COATED ORAL at 20:44

## 2025-03-20 NOTE — THERAPY TREATMENT NOTE
"Patient Name: Olu Izquierdo  : 1940    MRN: [id number]                              Today's Date: 3/20/2025       Admit Date: 3/17/2025    Visit Dx:     ICD-10-CM ICD-9-CM   1. Spinal stenosis of lumbar region with neurogenic claudication  M48.062 724.03     Patient Active Problem List   Diagnosis    Essential hypertension    Hypercholesterolemia    Lower urinary tract infectious disease    Spinal stenosis of lumbar region with neurogenic claudication    Mild episode of recurrent major depressive disorder    Neuropathy     Past Medical History:   Diagnosis Date    Anesthesia complication     HAD DIFFICULTY URINATING AFTER SURGERY    Arthritis     Hyperlipidemia     Hypertension     Injury of back     Back surgery 1984 partial discectomy with \"sciatic nerve damage\"    Low back pain     Neuropathy     BILAT FEET    Numbness     left knee    Spinal stenosis     LUMBAR     Past Surgical History:   Procedure Laterality Date    CATARACT EXTRACTION, BILATERAL      EPIDURAL BLOCK      EYE SURGERY      LUMBAR DISCECTOMY      LUMBAR DISCECTOMY Right 2019    Procedure: Right Lumbar two-three laminectomy with metrix;  Surgeon: Luis Dudley MD;  Location: [facility];  Service: Neurosurgery    LUMBAR DISCECTOMY FUSION INSTRUMENTATION N/A 3/17/2025    Procedure: Lumbar 2 to lumbar 3 and lumbar 3 to lumbar 4 laminectomy with neurolysis with fusion and instrumentation;  Surgeon: Luis Dudley MD;  Location: [facility];  Service: Neurosurgery;  Laterality: N/A;      General Information       Row Name 25 1631          Physical Therapy Time and Intention    Document Type therapy note (daily note)  -EB     Mode of Treatment individual therapy;physical therapy  -EB       Row Name 25 1631          General Information    Patient Profile Reviewed yes  -EB     Existing Precautions/Restrictions fall;orthostatic hypotension;spinal;LSO;brace worn when out of bed  -EB       Row Name 25 1631       "    Cognition    Orientation Status (Cognition) oriented x 4  -EB       Row Name 25 1631          Safety Issues/Impairments Affecting Functional Mobility    Impairments Affecting Function (Mobility) balance;endurance/activity tolerance;strength;pain  -EB               User Key  (r) = Recorded By, (t) = Taken By, (c) = Cosigned By      Initials Name Provider Type    EB Elenita Schreiber PTA Physical Therapist Assistant                   Mobility       Row Name 25 1631          Bed Mobility    Supine-Sit [place] (Bed Mobility) contact guard;verbal cues  -EB     Sit-Supine [place] (Bed Mobility) minimum assist (75% patient effort);verbal cues  -EB     Assistive Device (Bed Mobility) bed rails;head of bed elevated  -EB     Comment, (Bed Mobility) sequencing cues for log roll technique and needed assist with LEs back into bed.  -EB       Row Name 25 1631          Transfers    Comment, (Transfers) checked pt's BP while pt sitting EOB: 138/59, checked BP again while pt standin/49  -EB       Row Name 25 1631          Sit-Stand Transfer    Sit-Stand [place] (Transfers) minimum assist (75% patient effort);2 person assist  -EB     Assistive Device (Sit-Stand Transfers) walker, front-wheeled  -EB     Comment, (Sit-Stand Transfer) Cues for hand placement  -EB       Row Name 25 1631          Gait/Stairs (Locomotion)    [place] Level (Gait) minimum assist (75% patient effort);1 person to manage equipment  -EB     Assistive Device (Gait) walker, front-wheeled  -EB     Distance in Feet (Gait) 4  Lateral side steps (left/right) near EOB.  -EB     Deviations/Abnormal Patterns (Gait) gait speed decreased;base of support, narrow;stride length decreased  -EB     Bilateral Gait Deviations forward flexed posture  -EB     Comment, (Gait/Stairs) cues for upright posture. No increase of feeling light headed but pt did fatigue quickly. Stayed close to EOB.  -EB               User Key  (r) =  Recorded By, (t) = Taken By, (c) = Cosigned By      Initials Name Provider Type    Elenita Seaman PTA Physical Therapist Assistant                   Obj/Interventions    No documentation.                  Goals/Plan    No documentation.                  Clinical Impression       Row Name 03/20/25 1635          Pain    Pain Location back  -EB     Pain Side/Orientation lower  -EB     Pre/Posttreatment Pain Comment did not rate  -       Row Name 03/20/25 1635          Plan of Care Review    Plan of Care Reviewed With patient  -EB     Progress no change  -EB     Outcome Evaluation Pt seen for PT tx today. Pt agreeable to participate. Pt continues to have low back pain. Pt completed supine to sit with CGA needing sequencing cues for log roll technique and increased time to complete. Donned LSO brace while pt sitting EOB. Also, pt's BP taken in sitting 138/59. Pt stood at EOB with MinAX2 for about a minute. Pt did c/o mild light headedness but did not increase during activity. Pt's BP dropped in standing to 101/49. Pt took several lateral sides steps (left/right) at EOB, ~4ft  with rwx, Faiza. Pt fatigued quickly and returned pt back to sitting and Faiza needed for pt to return to sidelying left side. BP, weakness, and activity tolerance limiting pt's mobility. Pt will need SNF at d/c.  -       Row Name 03/20/25 1635          Therapy Assessment/Plan (PT)    Therapy Frequency (PT) 6 times/wk  -       Row Name 03/20/25 1635          Positioning and Restraints    Pre-Treatment Position in bed  -EB     Post Treatment Position bed  -EB     In Bed side lying left;call light within reach;exit alarm on;encouraged to call for assist;notified ns  -EB               User Key  (r) = Recorded By, (t) = Taken By, (c) = Cosigned By      Initials Name Provider Type    Elenita Seaman PTA Physical Therapist Assistant                   Outcome Measures       Row Name 03/20/25 1640 03/20/25 0832       How much help from another  person do you currently need...    Turning from your back to your side while in flat bed without using bedrails? 3  -EB 3  -ST    Moving from lying on back to sitting on the side of a flat bed without bedrails? 3  -EB 3  -ST    Moving to and from a bed to a chair (including a wheelchair)? 3  -EB 3  -ST    Standing up from a chair using your arms (e.g., wheelchair, bedside chair)? 2  -EB 3  -ST    Climbing 3-5 steps with a railing? 1  -EB 2  -ST    To walk in hospital room? 2  -EB 2  -ST    AM-PAC 6 Clicks Score (PT) 14  -EB 16  -ST    Highest Level of Mobility Goal 4 --> Transfer to chair/commode  -EB 5 --> Static standing  -ST              User Key  (r) = Recorded By, (t) = Taken By, (c) = Cosigned By      Initials Name Provider Type    Elenita Seaman PTA Physical Therapist Assistant    Juju Bob RN Registered Nurse                                 Physical Therapy Education       Title: PT OT SLP Therapies (Done)       Topic: Physical Therapy (Done)       Point: Mobility training (Done)       Learning Progress Summary            Patient Acceptance, E,D, VU by EB at 3/20/2025 1641    Acceptance, E, VU,NR by MG at 3/19/2025 1608    Acceptance, E, VU by SM at 3/18/2025 1143                      Point: Home exercise program (Done)       Learning Progress Summary            Patient Acceptance, E, VU,NR by MG at 3/19/2025 1608    Acceptance, E, VU by SM at 3/18/2025 1143                      Point: Body mechanics (Done)       Learning Progress Summary            Patient Acceptance, E,D, VU by EB at 3/20/2025 1641    Acceptance, E, VU,NR by MG at 3/19/2025 1608    Acceptance, E, VU by SM at 3/18/2025 1143                      Point: Precautions (Done)       Learning Progress Summary            Patient Acceptance, E,D, VU by EB at 3/20/2025 1641    Acceptance, E, VU,NR by MG at 3/19/2025 1608    Acceptance, E, VU by SM at 3/18/2025 1143                                      User Key       Initials Effective Dates  Name Provider Type Discipline    MG 05/24/22 -  Jerica Wilburn, PT Physical Therapist PT    EB 02/14/23 -  Elenita Schreiber PTA Physical Therapist Assistant PT     05/02/22 -  Kianna Lira PT Physical Therapist PT                  PT Recommendation and Plan     Progress: no change  Outcome Evaluation: Pt seen for PT tx today. Pt agreeable to participate. Pt continues to have low back pain. Pt completed supine to sit with CGA needing sequencing cues for log roll technique and increased time to complete. Donned LSO brace while pt sitting EOB. Also, pt's BP taken in sitting 138/59. Pt stood at EOB with MinAX2 for about a minute. Pt did c/o mild light headedness but did not increase during activity. Pt's BP dropped in standing to 101/49. Pt took several lateral sides steps (left/right) at EOB, ~4ft  with rwx, Faiza. Pt fatigued quickly and returned pt back to sitting and Faiza needed for pt to return to sidelying left side. BP, weakness, and activity tolerance limiting pt's mobility. Pt will need SNF at d/c.     Time Calculation:         PT Charges       Row Name 03/20/25 1641             Time Calculation    Start Time 1428  -EB      Stop Time 1451  -EB      Time Calculation (min) 23 min  -EB      PT Received On 03/20/25  -EB      PT - Next Appointment 03/21/25  -EB         Time Calculation- PT    Total Timed Code Minutes- PT 23 minute(s)  -EB                User Key  (r) = Recorded By, (t) = Taken By, (c) = Cosigned By      Initials Name Provider Type    EB Elenita Schreiber PTA Physical Therapist Assistant                  Therapy Charges for Today       Code Description Service Date Service Provider Modifiers Qty    12535636023 HC GAIT TRAINING EA 15 MIN 3/20/2025 Elenita Schreiber PTA GP 1    11373841054 HC PT THERAPEUTIC ACT EA 15 MIN 3/20/2025 Elenita Schreiber PTA GP 1    55140883899 HC PT THER SUPP EA 15 MIN 3/20/2025 Elenita Schreiber PTA GP 2            PT G-Codes  Outcome Measure Options: AM-PAC 6 Clicks Basic  Mobility (PT)  AM-PAC 6 Clicks Score (PT): 14  PT Discharge Summary  Anticipated Discharge Disposition (PT): skilled nursing facility    Elenita Schreiber PTA  3/20/2025

## 2025-03-20 NOTE — PROGRESS NOTES
[facility] THORACIC/LUMBAR NEUROSURGERY PROGRESS NOTE      CC: POD # 3 s/p L2-4 laminectomy with fusion       Subjective     Interval History: Patient became orthostatic yesterday when attempting to stand up to work with PT.  Patient attempting to eat, abdomen starting to feel a little better.  He is passing flatus, no bowel movement as of yet.  Continues to report improvement in his leg pain.  White count mildly elevated today at 14.      Objective     Vital signs in last 24 hours:  Temp:  [97.8 °F (36.6 °C)-98.2 °F (36.8 °C)] 98.2 °F (36.8 °C)  Heart Rate:  [] 90  Resp:  [16-18] 16  BP: ()/(51-73) 146/69    Intake/Output this shift:  I/O this shift:  In: 120 [P.O.:120]  Out: -     LABS:  Results from last 7 days   Lab Units 03/20/25  0529 03/19/25  0346 03/18/25  0407   WBC 10*3/mm3 14.66* 10.81* 13.31*   HEMOGLOBIN g/dL 10.3* 10.2* 10.7*   HEMATOCRIT % 30.2* 29.7* 32.5*   PLATELETS 10*3/mm3 199 164 172         IMAGING STUDIES:    KUB:    FINDINGS: The bowel gas pattern is nonobstructive. There is no  abnormally increased stool burden. Lung bases are clear. Postsurgical  changes of the lumbar spine    I personally viewed the patient's chart, it was also reviewed by and discussed with Dr Luis Enrique Granado reviewed/changed: Yes  Tylenol 500 mg p.o. daily  Lipitor 10 mg p.o. nightly  Robaxin 500 mg p.o. every 8 hours  Metoprolol 25 mg p.o. nightly  Lyrica 100 mg p.o. twice daily  Aldactone 50 mg p.o. daily  Hydrocodone 5 mg 1 p.o. every 4 hours as needed  Morphine 1 mg IV every 2 hours as needed    Physical Exam:    General:  Awake & alert  Back: Midline lumbar surgical incision is well-appearing, well-approximated with Steri-Strips in place.  No surrounding erythema, swelling or drainage.  Motor:  Normal muscle strength, bulk and tone in lower extremities.   Sensation:  Normal to light touch bilateral LE's  Station and Gait: Not up ambulating d/t dizziness and orthostatic hypotension  Extremities:  Wearing  "SCD's.  No calf tenderness or swelling noted bilaterally      Assessment & Plan     ASSESSMENT:      Spinal stenosis of lumbar region with neurogenic claudication    Essential hypertension    Hypercholesterolemia    POD # 3 s/p L2-4 laminectomy with fusion   Patient is having a difficult time working with PT due to becoming dizzy and was orthostatic yesterday with attempt to stand.  Encouraged patient that he really needs to get up into the chair at least for his meals today.  White count mildly elevated at 14 today(10.8 yesterday), vancomycin ordered, will recheck labs tomorrow.  Patient has been afebrile and is having no infectious symptoms.  His surgical incision is well-appearing.  He does report mild improvement in his abdominal discomfort, KUB performed yesterday was normal.  Encouraged to increase fluid intake and try to eat small snacks or meals.    PLAN:   -NS @ 125cc/hr x 1 liter  -CBC am  -Up in chair for meals  -LSO when OOB  -Continue to attempt to get up with PT  -Vancomycin-pharmacy to dose  -Will need rehab at discharge    I discussed the patient's findings and my recommendations with patient, nursing staff, and Dr Dudley        During patient visit, I utilized appropriate personal protective equipment including gloves.  Appropriate PPE was worn during the entire visit.  Hand hygiene was completed before and after.      LOS: 3 days       Tena Graham, APRN  3/20/2025  08:27 EDT    \"Dictated utilizing Dragon dictation\".      "

## 2025-03-20 NOTE — PROGRESS NOTES
"[facility] Clinical Pharmacy Services: Vancomycin Pharmacokinetic Initial Consult Note    Olu Izquierdo is a 84 y.o. male who is on day 1 of pharmacy to dose vancomycin.    Indication: Empiric  Consulting Provider: Tena Graham  Planned Duration of Therapy: 2 days    Loading Dose Ordered or Given: 1750 mg on 3/20 at 1101  MRSA PCR performed:  ; Result:    Culture/Source:    Target: -600 mg/L.hr           Vitals/Labs  Ht: 182.9 cm (72\"); Wt: 88 kg (194 lb 0.1 oz)  Temp Readings from Last 1 Encounters:   03/20/25 98.4 °F (36.9 °C) (Oral)    Estimated Creatinine Clearance: 95.4 mL/min (A) (by C-G formula based on SCr of 0.73 mg/dL (L)).        Results from last 7 days   Lab Units 03/20/25  0529 03/19/25  0346 03/18/25  0812 03/18/25  0407   CREATININE mg/dL 0.73* 0.82 1.02  --    WBC 10*3/mm3 14.66* 10.81*  --  13.31*     Assessment/Plan:    Vancomycin Dose:   1000 mg IV every  12  hours  Predictive AUC level for the dose ordered is 512 mg/L.hr, which is within the target of 400-600 mg/L.hr   No levels needed due to short dutration    Pharmacy will follow patient's kidney function and will adjust doses and obtain levels as necessary. Thank you for involving pharmacy in this patient's care. Please contact pharmacy with any questions or concerns.                           Connie Doyle, formerly [facility]  Clinical Pharmacist    "

## 2025-03-20 NOTE — PLAN OF CARE
Goal Outcome Evaluation:           Progress: no change  Outcome Evaluation: Patient A&Ox4. BM today. Activity encouraged. Patient hesitant to sit UIC- c/o brace discomfort. Sat EOB today to eat meals but declined chair. Reports better appetite today and ate more of his meals, also drank Boost. SNF being recommended for d/c but family wants patient to be d/c home with 24/7 care.

## 2025-03-20 NOTE — PROGRESS NOTES
"DAILY PROGRESS NOTE  [facility]    Patient Identification:  Name: Olu Izquierdo  Age: 84 y.o.  Sex: male  :  1940  MRN: [id number]         Primary Care Physician: Edenilson Henry MD    Subjective:  Interval History: He complains of some back pain and has had some problems with some orthostatic hypotension.  Blood pressure has been better and holding blood pressure medication.    Objective:    Scheduled Meds:acetaminophen, 500 mg, Oral, Daily  atorvastatin, 10 mg, Oral, Nightly  [Held by provider] hydrALAZINE, 25 mg, Oral, BID  [Held by provider] hydroCHLOROthiazide, 12.5 mg, Oral, Daily  melatonin, 10 mg, Oral, Nightly  methocarbamol, 500 mg, Oral, Q8H  [Held by provider] metoprolol succinate XL, 25 mg, Oral, Nightly  pregabalin, 100 mg, Oral, BID  sodium chloride, 3 mL, Intravenous, Q12H  [Held by provider] spironolactone, 50 mg, Oral, Daily      Continuous Infusions:Pharmacy to dose vancomycin,         Vital signs in last 24 hours:  Temp:  [97.8 °F (36.6 °C)-98.2 °F (36.8 °C)] 98.1 °F (36.7 °C)  Heart Rate:  [] 86  Resp:  [16-18] 16  BP: ()/(51-73) 125/57    Intake/Output:    Intake/Output Summary (Last 24 hours) at 3/20/2025 1323  Last data filed at 3/20/2025 1200  Gross per 24 hour   Intake 660 ml   Output --   Net 660 ml       Exam:  /57 (BP Location: Right arm, Patient Position: Lying)   Pulse 86   Temp 98.1 °F (36.7 °C) (Oral)   Resp 16   Ht 182.9 cm (72\")   Wt 88 kg (194 lb 0.1 oz)   SpO2 96%   BMI 26.31 kg/m²     General Appearance:    Alert, cooperative, no distress   Head:    Normocephalic, without obvious abnormality, atraumatic   Eyes:       Throat:   Lips, tongue, gums normal   Neck:   Supple, symmetrical, trachea midline, no JVD   Lungs:     Clear to auscultation bilaterally, respirations unlabored   Chest Wall:    No tenderness or deformity    Heart:    Regular rate and rhythm, S1 and S2 normal, no murmur,no  rub or gallop   Abdomen:     Soft, " nontender, bowel sounds active, no masses, no organomegaly    Extremities:   Extremities normal, atraumatic, no cyanosis or edema   Pulses:      Skin:   Skin is warm and dry,  no rashes or palpable lesions   Neurologic:   no focal deficits noted      Lab Results (last 72 hours)       Procedure Component Value Units Date/Time    Basic Metabolic Panel [[id number]]  (Abnormal) Collected: 03/19/25 0346    Specimen: Blood Updated: 03/19/25 0450     Glucose 116 mg/dL      BUN 11 mg/dL      Creatinine 0.82 mg/dL      Sodium 133 mmol/L      Potassium 4.1 mmol/L      Chloride 99 mmol/L      CO2 24.0 mmol/L      Calcium 8.4 mg/dL      BUN/Creatinine Ratio 13.4     Anion Gap 10.0 mmol/L      eGFR 87.2 mL/min/1.73     Narrative:      GFR Categories in Chronic Kidney Disease (CKD)      GFR Category          GFR (mL/min/1.73)    Interpretation  G1                     90 or greater         Normal or high (1)  G2                      60-89                Mild decrease (1)  G3a                   45-59                Mild to moderate decrease  G3b                   30-44                Moderate to severe decrease  G4                    15-29                Severe decrease  G5                    14 or less           Kidney failure          (1)In the absence of evidence of kidney disease, neither GFR category G1 or G2 fulfill the criteria for CKD.    eGFR calculation 2021 CKD-EPI creatinine equation, which does not include race as a factor    CBC & Differential [[id number]]  (Abnormal) Collected: 03/19/25 0346    Specimen: Blood Updated: 03/19/25 0434    Narrative:      The following orders were created for panel order CBC & Differential.  Procedure                               Abnormality         Status                     ---------                               -----------         ------                     CBC Auto Differential[[id number]]        Abnormal            Final result                 Please view results for these tests on  the individual orders.    CBC Auto Differential [[id number]]  (Abnormal) Collected: 03/19/25 0346    Specimen: Blood Updated: 03/19/25 0434     WBC 10.81 10*3/mm3      RBC 3.18 10*6/mm3      Hemoglobin 10.2 g/dL      Hematocrit 29.7 %      MCV 93.4 fL      MCH 32.1 pg      MCHC 34.3 g/dL      RDW 11.7 %      RDW-SD 39.6 fl      MPV 9.5 fL      Platelets 164 10*3/mm3      Neutrophil % 69.1 %      Lymphocyte % 12.3 %      Monocyte % 16.6 %      Eosinophil % 1.1 %      Basophil % 0.3 %      Immature Grans % 0.6 %      Neutrophils, Absolute 7.48 10*3/mm3      Lymphocytes, Absolute 1.33 10*3/mm3      Monocytes, Absolute 1.79 10*3/mm3      Eosinophils, Absolute 0.12 10*3/mm3      Basophils, Absolute 0.03 10*3/mm3      Immature Grans, Absolute 0.06 10*3/mm3      nRBC 0.0 /100 WBC     POC Glucose Once [[id number]]  (Abnormal) Collected: 03/18/25 1259    Specimen: Blood Updated: 03/18/25 1305     Glucose 141 mg/dL     Basic Metabolic Panel [[id number]]  (Abnormal) Collected: 03/18/25 0812    Specimen: Blood Updated: 03/18/25 0843     Glucose 130 mg/dL      BUN 14 mg/dL      Creatinine 1.02 mg/dL      Sodium 134 mmol/L      Potassium 4.5 mmol/L      Chloride 101 mmol/L      CO2 25.0 mmol/L      Calcium 8.4 mg/dL      BUN/Creatinine Ratio 13.7     Anion Gap 8.0 mmol/L      eGFR 72.9 mL/min/1.73     Narrative:      GFR Categories in Chronic Kidney Disease (CKD)      GFR Category          GFR (mL/min/1.73)    Interpretation  G1                     90 or greater         Normal or high (1)  G2                      60-89                Mild decrease (1)  G3a                   45-59                Mild to moderate decrease  G3b                   30-44                Moderate to severe decrease  G4                    15-29                Severe decrease  G5                    14 or less           Kidney failure          (1)In the absence of evidence of kidney disease, neither GFR category G1 or G2 fulfill the criteria for CKD.    eGFR  calculation 2021 CKD-EPI creatinine equation, which does not include race as a factor    CBC & Differential [[id number]]  (Abnormal) Collected: 03/18/25 0407    Specimen: Blood from Arm, Right Updated: 03/18/25 0446    Narrative:      The following orders were created for panel order CBC & Differential.  Procedure                               Abnormality         Status                     ---------                               -----------         ------                     CBC Auto Differential[[id number]]        Abnormal            Final result                 Please view results for these tests on the individual orders.    CBC Auto Differential [[id number]]  (Abnormal) Collected: 03/18/25 0407    Specimen: Blood from Arm, Right Updated: 03/18/25 0446     WBC 13.31 10*3/mm3      RBC 3.33 10*6/mm3      Hemoglobin 10.7 g/dL      Hematocrit 32.5 %      MCV 97.6 fL      MCH 32.1 pg      MCHC 32.9 g/dL      RDW 12.0 %      RDW-SD 43.4 fl      MPV 9.6 fL      Platelets 172 10*3/mm3      Neutrophil % 68.0 %      Lymphocyte % 14.6 %      Monocyte % 16.1 %      Eosinophil % 0.5 %      Basophil % 0.2 %      Immature Grans % 0.6 %      Neutrophils, Absolute 9.05 10*3/mm3      Lymphocytes, Absolute 1.94 10*3/mm3      Monocytes, Absolute 2.14 10*3/mm3      Eosinophils, Absolute 0.07 10*3/mm3      Basophils, Absolute 0.03 10*3/mm3      Immature Grans, Absolute 0.08 10*3/mm3      nRBC 0.0 /100 WBC           Data Review:  Results from last 7 days   Lab Units 03/20/25  0529 03/19/25 0346 03/18/25  0812   SODIUM mmol/L 134* 133* 134*   POTASSIUM mmol/L 4.0 4.1 4.5   CHLORIDE mmol/L 96* 99 101   CO2 mmol/L 26.3 24.0 25.0   BUN mg/dL 12 11 14   CREATININE mg/dL 0.73* 0.82 1.02   GLUCOSE mg/dL 124* 116* 130*   CALCIUM mg/dL 8.8 8.4* 8.4*     Results from last 7 days   Lab Units 03/20/25  0529 03/19/25 0346 03/18/25 0407   WBC 10*3/mm3 14.66* 10.81* 13.31*   HEMOGLOBIN g/dL 10.3* 10.2* 10.7*   HEMATOCRIT % 30.2* 29.7* 32.5*  "  PLATELETS 10*3/mm3 199 164 172             No results found for: \"TROPONINT\"            Invalid input(s): \"PROT\", \"LABALBU\"          Glucose   Date/Time Value Ref Range Status   03/18/2025 1259 141 (H) 70 - 130 mg/dL Final           Past Medical History:   Diagnosis Date    Anesthesia complication     HAD DIFFICULTY URINATING AFTER SURGERY    Arthritis     Hyperlipidemia     Hypertension     Injury of back     Back surgery 1984 partial discectomy with \"sciatic nerve damage\"    Low back pain     Neuropathy     BILAT FEET    Numbness     left knee    Spinal stenosis     LUMBAR       Assessment:  Active Hospital Problems    Diagnosis  POA    **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       Plan:  Continue with postop care and holding blood pressure medicine for low blood pressure.  Follow-up on labs.  DC planning.  May need SNU for rehab if he does not get any stronger.    Jarocho Campos MD  3/20/2025  13:23 EDT   "

## 2025-03-20 NOTE — PROGRESS NOTES
Continued Stay Note  [facility]     Patient Name: Olu Izquierdo  MRN: [id number]  Today's Date: 3/20/2025    Admit Date: 3/17/2025        Discharge Plan       Row Name 03/20/25 4620       Plan    Plan Comments Spoke with pts wife Hollie regarding d/c planning. Per Hollie, patient and wife states he will not go to SNF and they have 24hr caregivers who will be able to attend to all needs. Informed MD/physical therapy all recommend SNF, they still refuse. Per wife, they have all DME needed at the home and everything is on the 1st level of the house, they even have a ramp. Plan at this time will be to d/c home with [facility] and 24/7 caregivers/family.                   Discharge Codes    No documentation.                       Keena Carlin RN

## 2025-03-20 NOTE — PLAN OF CARE
Goal Outcome Evaluation:  Plan of Care Reviewed With: patient        Progress: no change  Outcome Evaluation: Pt seen for PT tx today. Pt agreeable to participate. Pt continues to have low back pain. Pt completed supine to sit with CGA needing sequencing cues for log roll technique and increased time to complete. Donned LSO brace while pt sitting EOB. Also, pt's BP taken in sitting 138/59. Pt stood at EOB with MinAX2 for about a minute. Pt did c/o mild light headedness but did not increase during activity. Pt's BP dropped in standing to 101/49. Pt took several lateral sides steps (left/right) at EOB, ~4ft  with rwx, Faiza. Pt fatigued quickly and returned pt back to sitting and Faiza needed for pt to return to sidelying left side. BP, weakness, and activity tolerance limiting pt's mobility. Pt will need SNF at d/c.    Anticipated Discharge Disposition (PT): skilled nursing facility

## 2025-03-21 LAB
ANION GAP SERPL CALCULATED.3IONS-SCNC: 6.9 MMOL/L (ref 5–15)
BASOPHILS # BLD AUTO: 0.05 10*3/MM3 (ref 0–0.2)
BASOPHILS NFR BLD AUTO: 0.4 % (ref 0–1.5)
BUN SERPL-MCNC: 12 MG/DL (ref 8–23)
BUN/CREAT SERPL: 18.2 (ref 7–25)
CALCIUM SPEC-SCNC: 8.1 MG/DL (ref 8.6–10.5)
CHLORIDE SERPL-SCNC: 103 MMOL/L (ref 98–107)
CO2 SERPL-SCNC: 27.1 MMOL/L (ref 22–29)
CREAT SERPL-MCNC: 0.66 MG/DL (ref 0.76–1.27)
DEPRECATED RDW RBC AUTO: 41.2 FL (ref 37–54)
EGFRCR SERPLBLD CKD-EPI 2021: 93.1 ML/MIN/1.73
EOSINOPHIL # BLD AUTO: 0.31 10*3/MM3 (ref 0–0.4)
EOSINOPHIL NFR BLD AUTO: 2.7 % (ref 0.3–6.2)
ERYTHROCYTE [DISTWIDTH] IN BLOOD BY AUTOMATED COUNT: 11.7 % (ref 12.3–15.4)
GLUCOSE SERPL-MCNC: 118 MG/DL (ref 65–99)
HCT VFR BLD AUTO: 28.7 % (ref 37.5–51)
HGB BLD-MCNC: 9.4 G/DL (ref 13–17.7)
IMM GRANULOCYTES # BLD AUTO: 0.15 10*3/MM3 (ref 0–0.05)
IMM GRANULOCYTES NFR BLD AUTO: 1.3 % (ref 0–0.5)
LYMPHOCYTES # BLD AUTO: 1.65 10*3/MM3 (ref 0.7–3.1)
LYMPHOCYTES NFR BLD AUTO: 14.6 % (ref 19.6–45.3)
MCH RBC QN AUTO: 31.5 PG (ref 26.6–33)
MCHC RBC AUTO-ENTMCNC: 32.8 G/DL (ref 31.5–35.7)
MCV RBC AUTO: 96.3 FL (ref 79–97)
MONOCYTES # BLD AUTO: 1.74 10*3/MM3 (ref 0.1–0.9)
MONOCYTES NFR BLD AUTO: 15.3 % (ref 5–12)
NEUTROPHILS NFR BLD AUTO: 65.7 % (ref 42.7–76)
NEUTROPHILS NFR BLD AUTO: 7.44 10*3/MM3 (ref 1.7–7)
NRBC BLD AUTO-RTO: 0 /100 WBC (ref 0–0.2)
PLATELET # BLD AUTO: 196 10*3/MM3 (ref 140–450)
PMV BLD AUTO: 9.6 FL (ref 6–12)
POTASSIUM SERPL-SCNC: 3.8 MMOL/L (ref 3.5–5.2)
RBC # BLD AUTO: 2.98 10*6/MM3 (ref 4.14–5.8)
SODIUM SERPL-SCNC: 137 MMOL/L (ref 136–145)
WBC NRBC COR # BLD AUTO: 11.34 10*3/MM3 (ref 3.4–10.8)

## 2025-03-21 PROCEDURE — 97116 GAIT TRAINING THERAPY: CPT

## 2025-03-21 PROCEDURE — 80048 BASIC METABOLIC PNL TOTAL CA: CPT | Performed by: STUDENT IN AN ORGANIZED HEALTH CARE EDUCATION/TRAINING PROGRAM

## 2025-03-21 PROCEDURE — 25010000002 VANCOMYCIN 1 G RECONSTITUTED SOLUTION 1 EACH VIAL: Performed by: NURSE PRACTITIONER

## 2025-03-21 PROCEDURE — 85025 COMPLETE CBC W/AUTO DIFF WBC: CPT | Performed by: STUDENT IN AN ORGANIZED HEALTH CARE EDUCATION/TRAINING PROGRAM

## 2025-03-21 PROCEDURE — 25810000003 SODIUM CHLORIDE 0.9 % SOLUTION 250 ML FLEX CONT: Performed by: NURSE PRACTITIONER

## 2025-03-21 PROCEDURE — 99024 POSTOP FOLLOW-UP VISIT: CPT | Performed by: NURSE PRACTITIONER

## 2025-03-21 PROCEDURE — 25010000002 ENOXAPARIN PER 10 MG: Performed by: NURSE PRACTITIONER

## 2025-03-21 RX ORDER — ENOXAPARIN SODIUM 100 MG/ML
40 INJECTION SUBCUTANEOUS EVERY 24 HOURS
Status: DISCONTINUED | OUTPATIENT
Start: 2025-03-21 | End: 2025-03-24 | Stop reason: HOSPADM

## 2025-03-21 RX ADMIN — PREGABALIN 100 MG: 100 CAPSULE ORAL at 20:06

## 2025-03-21 RX ADMIN — METHOCARBAMOL TABLETS 500 MG: 500 TABLET, COATED ORAL at 03:29

## 2025-03-21 RX ADMIN — Medication 10 MG: at 20:06

## 2025-03-21 RX ADMIN — ACETAMINOPHEN 500 MG: 500 TABLET, FILM COATED ORAL at 08:14

## 2025-03-21 RX ADMIN — ATORVASTATIN CALCIUM 10 MG: 20 TABLET, FILM COATED ORAL at 20:06

## 2025-03-21 RX ADMIN — METHOCARBAMOL TABLETS 500 MG: 500 TABLET, COATED ORAL at 11:04

## 2025-03-21 RX ADMIN — HYDROCODONE BITARTRATE AND ACETAMINOPHEN 1 TABLET: 5; 325 TABLET ORAL at 15:31

## 2025-03-21 RX ADMIN — METHOCARBAMOL TABLETS 500 MG: 500 TABLET, COATED ORAL at 17:57

## 2025-03-21 RX ADMIN — POLYETHYLENE GLYCOL 3350 17 G: 17 POWDER, FOR SOLUTION ORAL at 17:57

## 2025-03-21 RX ADMIN — HYDROCODONE BITARTRATE AND ACETAMINOPHEN 1 TABLET: 5; 325 TABLET ORAL at 20:06

## 2025-03-21 RX ADMIN — Medication 5 MG: at 21:52

## 2025-03-21 RX ADMIN — ENOXAPARIN SODIUM 40 MG: 100 INJECTION SUBCUTANEOUS at 12:42

## 2025-03-21 RX ADMIN — ZOLPIDEM TARTRATE 5 MG: 5 TABLET, FILM COATED ORAL at 21:52

## 2025-03-21 RX ADMIN — HYDROCODONE BITARTRATE AND ACETAMINOPHEN 1 TABLET: 5; 325 TABLET ORAL at 11:04

## 2025-03-21 RX ADMIN — Medication 3 ML: at 08:43

## 2025-03-21 RX ADMIN — SODIUM CHLORIDE 1000 MG: 9 INJECTION, SOLUTION INTRAVENOUS at 11:04

## 2025-03-21 RX ADMIN — SODIUM CHLORIDE 1000 MG: 9 INJECTION, SOLUTION INTRAVENOUS at 21:53

## 2025-03-21 RX ADMIN — PREGABALIN 100 MG: 100 CAPSULE ORAL at 08:14

## 2025-03-21 NOTE — PLAN OF CARE
Goal Outcome Evaluation:           Progress: improving  Outcome Evaluation: See MAR for pain management. VSS. Denies other needs at this time. Pt desires to d/c home when able.

## 2025-03-21 NOTE — PROGRESS NOTES
[facility] THORACIC/LUMBAR NEUROSURGERY PROGRESS NOTE     CC: POD # 4 s/p L2-4 laminectomy with fusion       Subjective     Interval History: Patient having expected postop back pain.  He does report improvement in his abdominal discomfort.  He is agreeable to go to rehab.  White count improved today.  Patient has been afebrile.        Objective     Vital signs in last 24 hours:  Temp:  [97.3 °F (36.3 °C)-98.4 °F (36.9 °C)] 98.1 °F (36.7 °C)  Heart Rate:  [77-97] 97  Resp:  [16-18] 16  BP: (125-144)/(57-72) 138/63    Intake/Output this shift:  I/O this shift:  In: 360 [P.O.:360]  Out: -     LABS:  Results from last 7 days   Lab Units 03/21/25  0518 03/20/25  0529 03/19/25  0346   WBC 10*3/mm3 11.34* 14.66* 10.81*   HEMOGLOBIN g/dL 9.4* 10.3* 10.2*   HEMATOCRIT % 28.7* 30.2* 29.7*   PLATELETS 10*3/mm3 196 199 164     Results from last 7 days   Lab Units 03/21/25  0518 03/20/25  0529 03/19/25  0346   SODIUM mmol/L 137 134* 133*   POTASSIUM mmol/L 3.8 4.0 4.1   CHLORIDE mmol/L 103 96* 99   CO2 mmol/L 27.1 26.3 24.0   BUN mg/dL 12 12 11   CREATININE mg/dL 0.66* 0.73* 0.82   GLUCOSE mg/dL 118* 124* 116*   CALCIUM mg/dL 8.1* 8.8 8.4*           IMAGING STUDIES:  No new imaging to review    I personally viewed the patient's chart, it was also reviewed by and discussed with Dr Luis Enrique Granado reviewed/changed: Yes  Tylenol 500 mg p.o. daily  Lipitor 10 mg p.o. nightly  Robaxin 500 mg p.o. every 8 hours  Metoprolol 25 mg p.o. nightly  Lyrica 100 mg p.o. twice daily  Aldactone 50 mg p.o. daily  Vancomycin 1 g IV every 12 hours  Hydrocodone 5 mg 1 p.o. every 4 hours as needed      Physical Exam:    General:  Awake & alert  Back: Midline lumbar surgical incision is well-appearing, well-approximated with Steri-Strips in place.  No surrounding erythema, swelling or drainage.  Motor:  Normal muscle strength, bulk and tone in lower extremities.    Sensation:  Normal to light touch bilateral LE's  Station and Gait:  Per PT note 3/20-Pt  "took several lateral sides steps (left/right) at EOB, ~4ft with rwx, Faiza. Pt fatigued quickly and returned pt back to sitting and Faiza needed for pt to return to sidelying left side.   Extremities:  Wearing SCD's.  No calf tenderness or swelling noted bilaterally      Assessment & Plan     ASSESSMENT:      Spinal stenosis of lumbar region with neurogenic claudication    Essential hypertension    Hypercholesterolemia    POD # 4 s/p L2-4 laminectomy with fusion   Patient states that he was able to take a few steps yesterday with PT however they were concerned due to his blood pressure being low and placed him back in bed.  He denies feeling dizzy or lightheaded when he was up yesterday.  He appears to be willing to work with PT and is agreeable to rehab at discharge.  His white count has improved, down to 11 today, hemoglobin stable at 9.4.  Will DC his vancomycin tomorrow.  Case management is working on rehab placement.  Patient needs to be up and mobilized as much as possible.    PLAN:   -Pt needs to be up and OOB, please have PT work with him today  -Lovenox for DVT prophylaxis  -Patient can be discharged to rehab whenever a bed is available  -Vancomycin to stop tomorrow      I discussed the patient's findings and my recommendations with patient, nursing staff, and Dr Dudley        During patient visit, I utilized appropriate personal protective equipment including gloves.  Appropriate PPE was worn during the entire visit.  Hand hygiene was completed before and after.      LOS: 4 days       Tena Graham, APRN  3/21/2025  10:41 EDT    \"Dictated utilizing Dragon dictation\".      "

## 2025-03-21 NOTE — PROGRESS NOTES
"DAILY PROGRESS NOTE  [facility]    Patient Identification:  Name: Olu Izquierdo  Age: 84 y.o.  Sex: male  :  1940  MRN: [id number]         Primary Care Physician: Edenilson Henry MD    Subjective:  Interval History: He complains of some back pain and has had some problems with some orthostatic hypotension.  Blood pressure has been better and holding blood pressure medication.    Objective:    Scheduled Meds:acetaminophen, 500 mg, Oral, Daily  atorvastatin, 10 mg, Oral, Nightly  [Held by provider] hydrALAZINE, 25 mg, Oral, BID  [Held by provider] hydroCHLOROthiazide, 12.5 mg, Oral, Daily  melatonin, 10 mg, Oral, Nightly  methocarbamol, 500 mg, Oral, Q8H  [Held by provider] metoprolol succinate XL, 25 mg, Oral, Nightly  pregabalin, 100 mg, Oral, BID  sodium chloride, 3 mL, Intravenous, Q12H  [Held by provider] spironolactone, 50 mg, Oral, Daily  vancomycin, 1,000 mg, Intravenous, Q12H      Continuous Infusions:Pharmacy to dose vancomycin,         Vital signs in last 24 hours:  Temp:  [97.3 °F (36.3 °C)-98.4 °F (36.9 °C)] 98.1 °F (36.7 °C)  Heart Rate:  [77-97] 97  Resp:  [16-18] 16  BP: (125-144)/(57-72) 138/63    Intake/Output:    Intake/Output Summary (Last 24 hours) at 3/21/2025 1020  Last data filed at 3/20/2025 1700  Gross per 24 hour   Intake 480 ml   Output --   Net 480 ml       Exam:  /63 (BP Location: Left arm, Patient Position: Lying)   Pulse 97   Temp 98.1 °F (36.7 °C) (Oral)   Resp 16   Ht 182.9 cm (72\")   Wt 88 kg (194 lb 0.1 oz)   SpO2 95%   BMI 26.31 kg/m²     General Appearance:    Alert, cooperative, no distress   Head:    Normocephalic, without obvious abnormality, atraumatic   Eyes:       Throat:   Lips, tongue, gums normal   Neck:   Supple, symmetrical, trachea midline, no JVD   Lungs:     Clear to auscultation bilaterally, respirations unlabored   Chest Wall:    No tenderness or deformity    Heart:    Regular rate and rhythm, S1 and S2 normal, no murmur,no  " rub or gallop   Abdomen:     Soft, nontender, bowel sounds active, no masses, no organomegaly    Extremities:   Extremities normal, atraumatic, no cyanosis or edema   Pulses:      Skin:   Skin is warm and dry,  no rashes or palpable lesions   Neurologic:   no focal deficits noted      Lab Results (last 72 hours)       Procedure Component Value Units Date/Time    Basic Metabolic Panel [[id number]]  (Abnormal) Collected: 03/19/25 0346    Specimen: Blood Updated: 03/19/25 0450     Glucose 116 mg/dL      BUN 11 mg/dL      Creatinine 0.82 mg/dL      Sodium 133 mmol/L      Potassium 4.1 mmol/L      Chloride 99 mmol/L      CO2 24.0 mmol/L      Calcium 8.4 mg/dL      BUN/Creatinine Ratio 13.4     Anion Gap 10.0 mmol/L      eGFR 87.2 mL/min/1.73     Narrative:      GFR Categories in Chronic Kidney Disease (CKD)      GFR Category          GFR (mL/min/1.73)    Interpretation  G1                     90 or greater         Normal or high (1)  G2                      60-89                Mild decrease (1)  G3a                   45-59                Mild to moderate decrease  G3b                   30-44                Moderate to severe decrease  G4                    15-29                Severe decrease  G5                    14 or less           Kidney failure          (1)In the absence of evidence of kidney disease, neither GFR category G1 or G2 fulfill the criteria for CKD.    eGFR calculation 2021 CKD-EPI creatinine equation, which does not include race as a factor    CBC & Differential [[id number]]  (Abnormal) Collected: 03/19/25 0346    Specimen: Blood Updated: 03/19/25 0434    Narrative:      The following orders were created for panel order CBC & Differential.  Procedure                               Abnormality         Status                     ---------                               -----------         ------                     CBC Auto Differential[[id number]]        Abnormal            Final result                  Please view results for these tests on the individual orders.    CBC Auto Differential [[id number]]  (Abnormal) Collected: 03/19/25 0346    Specimen: Blood Updated: 03/19/25 0434     WBC 10.81 10*3/mm3      RBC 3.18 10*6/mm3      Hemoglobin 10.2 g/dL      Hematocrit 29.7 %      MCV 93.4 fL      MCH 32.1 pg      MCHC 34.3 g/dL      RDW 11.7 %      RDW-SD 39.6 fl      MPV 9.5 fL      Platelets 164 10*3/mm3      Neutrophil % 69.1 %      Lymphocyte % 12.3 %      Monocyte % 16.6 %      Eosinophil % 1.1 %      Basophil % 0.3 %      Immature Grans % 0.6 %      Neutrophils, Absolute 7.48 10*3/mm3      Lymphocytes, Absolute 1.33 10*3/mm3      Monocytes, Absolute 1.79 10*3/mm3      Eosinophils, Absolute 0.12 10*3/mm3      Basophils, Absolute 0.03 10*3/mm3      Immature Grans, Absolute 0.06 10*3/mm3      nRBC 0.0 /100 WBC     POC Glucose Once [[id number]]  (Abnormal) Collected: 03/18/25 1259    Specimen: Blood Updated: 03/18/25 1305     Glucose 141 mg/dL     Basic Metabolic Panel [[id number]]  (Abnormal) Collected: 03/18/25 0812    Specimen: Blood Updated: 03/18/25 0843     Glucose 130 mg/dL      BUN 14 mg/dL      Creatinine 1.02 mg/dL      Sodium 134 mmol/L      Potassium 4.5 mmol/L      Chloride 101 mmol/L      CO2 25.0 mmol/L      Calcium 8.4 mg/dL      BUN/Creatinine Ratio 13.7     Anion Gap 8.0 mmol/L      eGFR 72.9 mL/min/1.73     Narrative:      GFR Categories in Chronic Kidney Disease (CKD)      GFR Category          GFR (mL/min/1.73)    Interpretation  G1                     90 or greater         Normal or high (1)  G2                      60-89                Mild decrease (1)  G3a                   45-59                Mild to moderate decrease  G3b                   30-44                Moderate to severe decrease  G4                    15-29                Severe decrease  G5                    14 or less           Kidney failure          (1)In the absence of evidence of kidney disease, neither GFR category G1 or  G2 fulfill the criteria for CKD.    eGFR calculation 2021 CKD-EPI creatinine equation, which does not include race as a factor    CBC & Differential [[id number]]  (Abnormal) Collected: 03/18/25 0407    Specimen: Blood from Arm, Right Updated: 03/18/25 0446    Narrative:      The following orders were created for panel order CBC & Differential.  Procedure                               Abnormality         Status                     ---------                               -----------         ------                     CBC Auto Differential[[id number]]        Abnormal            Final result                 Please view results for these tests on the individual orders.    CBC Auto Differential [[id number]]  (Abnormal) Collected: 03/18/25 0407    Specimen: Blood from Arm, Right Updated: 03/18/25 0446     WBC 13.31 10*3/mm3      RBC 3.33 10*6/mm3      Hemoglobin 10.7 g/dL      Hematocrit 32.5 %      MCV 97.6 fL      MCH 32.1 pg      MCHC 32.9 g/dL      RDW 12.0 %      RDW-SD 43.4 fl      MPV 9.6 fL      Platelets 172 10*3/mm3      Neutrophil % 68.0 %      Lymphocyte % 14.6 %      Monocyte % 16.1 %      Eosinophil % 0.5 %      Basophil % 0.2 %      Immature Grans % 0.6 %      Neutrophils, Absolute 9.05 10*3/mm3      Lymphocytes, Absolute 1.94 10*3/mm3      Monocytes, Absolute 2.14 10*3/mm3      Eosinophils, Absolute 0.07 10*3/mm3      Basophils, Absolute 0.03 10*3/mm3      Immature Grans, Absolute 0.08 10*3/mm3      nRBC 0.0 /100 WBC           Data Review:  Results from last 7 days   Lab Units 03/21/25 0518 03/20/25 0529 03/19/25 0346   SODIUM mmol/L 137 134* 133*   POTASSIUM mmol/L 3.8 4.0 4.1   CHLORIDE mmol/L 103 96* 99   CO2 mmol/L 27.1 26.3 24.0   BUN mg/dL 12 12 11   CREATININE mg/dL 0.66* 0.73* 0.82   GLUCOSE mg/dL 118* 124* 116*   CALCIUM mg/dL 8.1* 8.8 8.4*     Results from last 7 days   Lab Units 03/21/25 0518 03/20/25  0529 03/19/25 0346   WBC 10*3/mm3 11.34* 14.66* 10.81*   HEMOGLOBIN g/dL 9.4* 10.3* 10.2*  "  HEMATOCRIT % 28.7* 30.2* 29.7*   PLATELETS 10*3/mm3 196 199 164             No results found for: \"TROPONINT\"            Invalid input(s): \"PROT\", \"LABALBU\"          Glucose   Date/Time Value Ref Range Status   03/18/2025 1259 141 (H) 70 - 130 mg/dL Final           Past Medical History:   Diagnosis Date    Anesthesia complication     HAD DIFFICULTY URINATING AFTER SURGERY    Arthritis     Hyperlipidemia     Hypertension     Injury of back     Back surgery 1984 partial discectomy with \"sciatic nerve damage\"    Low back pain     Neuropathy     BILAT FEET    Numbness     left knee    Spinal stenosis     LUMBAR       Assessment:  Active Hospital Problems    Diagnosis  POA    **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       Plan:  Continue with postop care and holding blood pressure medicine for low blood pressure.  Follow-up on labs.  DC planning.  May need SNU for rehab if he does not get any stronger.    Jarocho Campos MD  3/21/2025  10:20 EDT   "

## 2025-03-21 NOTE — PLAN OF CARE
Problem: Adult Inpatient Plan of Care  Goal: Plan of Care Review  Outcome: Progressing  Flowsheets (Taken 3/21/2025 1551)  Outcome Evaluation: vss, a/o x4, walked hallway with PT today and tolerated well with brace on, voiding well, prn pain medication, pt states he feels depressed today, plan for discharge on sunday to rehab.  Goal: Patient-Specific Goal (Individualized)  Outcome: Progressing  Goal: Absence of Hospital-Acquired Illness or Injury  Outcome: Progressing  Intervention: Identify and Manage Fall Risk  Recent Flowsheet Documentation  Taken 3/21/2025 1545 by Wil Powell RN  Safety Promotion/Fall Prevention:   safety round/check completed   nonskid shoes/slippers when out of bed  Taken 3/21/2025 1400 by Wil Powell RN  Safety Promotion/Fall Prevention:   safety round/check completed   nonskid shoes/slippers when out of bed  Taken 3/21/2025 1200 by Wil Powell RN  Safety Promotion/Fall Prevention:   safety round/check completed   nonskid shoes/slippers when out of bed  Taken 3/21/2025 1000 by Wil Powell RN  Safety Promotion/Fall Prevention:   safety round/check completed   nonskid shoes/slippers when out of bed  Taken 3/21/2025 0809 by Wil Powell RN  Safety Promotion/Fall Prevention:   safety round/check completed   nonskid shoes/slippers when out of bed  Intervention: Prevent Skin Injury  Recent Flowsheet Documentation  Taken 3/21/2025 1545 by Wil Powell RN  Body Position:   position changed independently   side-lying  Taken 3/21/2025 1400 by Wil Powell RN  Body Position:   turned   right  Taken 3/21/2025 1200 by Wil Powell RN  Body Position:   tilted   left  Taken 3/21/2025 1000 by Wil Powell RN  Body Position:   position changed independently   turned   left  Taken 3/21/2025 0809 by Wil Powell RN  Body Position:   position changed independently   weight shifting  Intervention: Prevent and Manage VTE (Venous Thromboembolism)  Risk  Recent Flowsheet Documentation  Taken 3/21/2025 0809 by Wil Powell RN  VTE Prevention/Management: SCDs (sequential compression devices) on  Goal: Optimal Comfort and Wellbeing  Outcome: Progressing  Intervention: Provide Person-Centered Care  Recent Flowsheet Documentation  Taken 3/21/2025 0809 by Wil Powell RN  Trust Relationship/Rapport: care explained  Goal: Readiness for Transition of Care  Outcome: Progressing     Problem: Infection  Goal: Absence of Infection Signs and Symptoms  Outcome: Progressing     Problem: Pain Acute  Goal: Optimal Pain Control and Function  Outcome: Progressing     Problem: Spinal Surgery  Goal: Optimal Coping with Surgery  Outcome: Progressing  Goal: Absence of Bleeding  Outcome: Progressing  Goal: Effective Bowel Elimination  Outcome: Progressing  Goal: Fluid and Electrolyte Balance  Outcome: Progressing  Goal: Optimal Functional Ability  Outcome: Progressing  Intervention: Optimize Functional Status  Recent Flowsheet Documentation  Taken 3/21/2025 1545 by Wil Powell RN  Activity Management: activity encouraged  Positioning/Transfer Devices:   pillows   in use  Taken 3/21/2025 1400 by Wil Powell RN  Activity Management: activity encouraged  Positioning/Transfer Devices:   pillows   in use  Taken 3/21/2025 1200 by Wil Powell RN  Activity Management: activity encouraged  Positioning/Transfer Devices:   pillows   in use  Taken 3/21/2025 1000 by Wil Powell RN  Activity Management: activity encouraged  Taken 3/21/2025 0809 by Wil Powell RN  Activity Management: activity encouraged  Positioning/Transfer Devices:   pillows   in use  Goal: Absence of Infection Signs and Symptoms  Outcome: Progressing  Goal: Optimal Neurologic Function  Outcome: Progressing  Intervention: Optimize Neurologic Function  Recent Flowsheet Documentation  Taken 3/21/2025 1545 by Wil Powell RN  Body Position:   position changed independently    side-lying  Taken 3/21/2025 1400 by Wil Powell RN  Body Position:   turned   right  Taken 3/21/2025 1200 by Wil Powell RN  Body Position:   tilted   left  Taken 3/21/2025 1000 by Wil Powell RN  Body Position:   position changed independently   turned   left  Taken 3/21/2025 0809 by Wil Powell RN  Body Position:   position changed independently   weight shifting  Pressure Reduction Devices: alternating pressure pump (RIVERA)  Goal: Anesthesia/Sedation Recovery  Outcome: Progressing  Intervention: Optimize Anesthesia Recovery  Recent Flowsheet Documentation  Taken 3/21/2025 1545 by Wil Powell RN  Safety Promotion/Fall Prevention:   safety round/check completed   nonskid shoes/slippers when out of bed  Taken 3/21/2025 1400 by Wil Powell RN  Safety Promotion/Fall Prevention:   safety round/check completed   nonskid shoes/slippers when out of bed  Taken 3/21/2025 1200 by Wil Powell RN  Safety Promotion/Fall Prevention:   safety round/check completed   nonskid shoes/slippers when out of bed  Taken 3/21/2025 1000 by Wil Powell RN  Safety Promotion/Fall Prevention:   safety round/check completed   nonskid shoes/slippers when out of bed  Taken 3/21/2025 0809 by Wil Powell RN  Safety Promotion/Fall Prevention:   safety round/check completed   nonskid shoes/slippers when out of bed  Goal: Optimal Pain Control and Function  Outcome: Progressing  Goal: Nausea and Vomiting Relief  Outcome: Progressing  Goal: Effective Urinary Elimination  Outcome: Progressing  Goal: Effective Oxygenation and Ventilation  Outcome: Progressing  Intervention: Optimize Oxygenation and Ventilation  Recent Flowsheet Documentation  Taken 3/21/2025 1545 by Wil Powell RN  Head of Bed (HOB) Positioning:   HOB lowered   HOB at 15 degrees  Taken 3/21/2025 1400 by Wil Powell RN  Head of Bed (HOB) Positioning: HOB at 15 degrees  Taken 3/21/2025 1200 by Wil Powell  RN  Head of Bed (HOB) Positioning: HOB at 30 degrees  Taken 3/21/2025 0809 by Wil Powell RN  Head of Bed (HOB) Positioning: HOB at 20-30 degrees     Problem: Fall Injury Risk  Goal: Absence of Fall and Fall-Related Injury  Outcome: Progressing  Intervention: Promote Injury-Free Environment  Recent Flowsheet Documentation  Taken 3/21/2025 1545 by Wil Powell RN  Safety Promotion/Fall Prevention:   safety round/check completed   nonskid shoes/slippers when out of bed  Taken 3/21/2025 1400 by Wil Powell RN  Safety Promotion/Fall Prevention:   safety round/check completed   nonskid shoes/slippers when out of bed  Taken 3/21/2025 1200 by Wil Powell RN  Safety Promotion/Fall Prevention:   safety round/check completed   nonskid shoes/slippers when out of bed  Taken 3/21/2025 1000 by Wil Powell RN  Safety Promotion/Fall Prevention:   safety round/check completed   nonskid shoes/slippers when out of bed  Taken 3/21/2025 0809 by Wil Powell RN  Safety Promotion/Fall Prevention:   safety round/check completed   nonskid shoes/slippers when out of bed     Problem: Skin Injury Risk Increased  Goal: Skin Health and Integrity  Outcome: Progressing  Intervention: Optimize Skin Protection  Recent Flowsheet Documentation  Taken 3/21/2025 1545 by Wil Powell RN  Activity Management: activity encouraged  Head of Bed (HOB) Positioning:   HOB lowered   HOB at 15 degrees  Taken 3/21/2025 1400 by Wil Powell RN  Activity Management: activity encouraged  Head of Bed (HOB) Positioning: HOB at 15 degrees  Taken 3/21/2025 1200 by Wil Powell RN  Activity Management: activity encouraged  Head of Bed (HOB) Positioning: HOB at 30 degrees  Taken 3/21/2025 1000 by Wil Powell RN  Activity Management: activity encouraged  Taken 3/21/2025 0809 by Wil Powell RN  Activity Management: activity encouraged  Head of Bed (HOB) Positioning: HOB at 20-30 degrees  Pressure  Reduction Devices: alternating pressure pump (RIVERA)   Goal Outcome Evaluation:              Outcome Evaluation: vss, a/o x4, walked hallway with PT today and tolerated well with brace on, voiding well, prn pain medication, pt states he feels depressed today, plan for discharge on sunday to rehab.

## 2025-03-21 NOTE — PROGRESS NOTES
Continued Stay Note  [facility]     Patient Name: Olu Izquierdo  MRN: [id number]  Today's Date: 3/21/2025    Admit Date: 3/17/2025    Plan: [facility], bed available 3/23   Discharge Plan       Row Name 03/21/25 1510       Plan    Plan [facility], bed available 3/23    Patient/Family in Agreement with Plan yes    Plan Comments Spoke with patient in great detail regarding d/c planning. Patient states his wife has dementia and should not be contacted regarding and decision making, her name was removed from facesheet and was asked to add his daughter in law Carola Izquierdo [phone]. Patient is agreeable to short term rehab. Referrals were sent to [facility], [facility] and [facility]. Per [facility] patient does not meet criteria for acute rehab. No beds available at [facility] per [facility]. Per [facility] patient has been approved with bed available [facility] 3/23. Pharmacy updated and packet will be on chart. CCP to arrange stretcher or  van transport at d/c.                   Discharge Codes    No documentation.                       Keena Carlin RN

## 2025-03-21 NOTE — PROGRESS NOTES
"[facility] Clinical Pharmacy Services: Enoxaparin Consult    Olu Izquierdo has a pharmacy consult to dose prophylactic enoxaparin per KRISHAN Garcia's request.     Indication: VTE Prophylaxis  Home Anticoagulation: N/A    Relevant clinical data and objective history reviewed:  84 y.o. male 182.9 cm (72\") 88 kg (194 lb 0.1 oz)   Body mass index is 26.31 kg/m².   Results from last 7 days   Lab Units 03/21/25  0518   PLATELETS 10*3/mm3 196     Estimated Creatinine Clearance: 105.6 mL/min (A) (by C-G formula based on SCr of 0.66 mg/dL (L)).    Assessment/Plan    Will start patient on 40mg subcutaneous every 24 hours, adjusted for renal function. Consult order will be discontinued but pharmacy will continue to follow.     [facility]  Clinical Pharmacist   "

## 2025-03-21 NOTE — PLAN OF CARE
Goal Outcome Evaluation:  Plan of Care Reviewed With: patient        Progress: improving  Outcome Evaluation: Pt seen for PT tx today. Pt in bed but agreeable to participate. Pt required CGA with bed mobility with sequencing cues for log roll technique. Assisted pt with donning back brace while seated EOB. Pt had no complaints of dizziness or Light headedness throughout tx session. Pt stood with Faiza and ambulated 2X30ft with rwx, CGA/Faiza. Pt c/o parth weakness and right toe pain with gait. Pt demos antalgic gait and unsteadiness but no LOB. Will continue to follow pt for d/c needs. Pt will benefit from SNF to return to PLOF of being independent.    Anticipated Discharge Disposition (PT): skilled nursing facility

## 2025-03-21 NOTE — THERAPY TREATMENT NOTE
"Patient Name: Olu Izquierdo  : 1940    MRN: [id number]                              Today's Date: 3/21/2025       Admit Date: 3/17/2025    Visit Dx:     ICD-10-CM ICD-9-CM   1. Spinal stenosis of lumbar region with neurogenic claudication  M48.062 724.03     Patient Active Problem List   Diagnosis    Essential hypertension    Hypercholesterolemia    Lower urinary tract infectious disease    Spinal stenosis of lumbar region with neurogenic claudication    Mild episode of recurrent major depressive disorder    Neuropathy     Past Medical History:   Diagnosis Date    Anesthesia complication     HAD DIFFICULTY URINATING AFTER SURGERY    Arthritis     Hyperlipidemia     Hypertension     Injury of back     Back surgery 1984 partial discectomy with \"sciatic nerve damage\"    Low back pain     Neuropathy     BILAT FEET    Numbness     left knee    Spinal stenosis     LUMBAR     Past Surgical History:   Procedure Laterality Date    CATARACT EXTRACTION, BILATERAL      EPIDURAL BLOCK      EYE SURGERY      LUMBAR DISCECTOMY      LUMBAR DISCECTOMY Right 2019    Procedure: Right Lumbar two-three laminectomy with metrix;  Surgeon: Luis Dudley MD;  Location: [facility];  Service: Neurosurgery    LUMBAR DISCECTOMY FUSION INSTRUMENTATION N/A 3/17/2025    Procedure: Lumbar 2 to lumbar 3 and lumbar 3 to lumbar 4 laminectomy with neurolysis with fusion and instrumentation;  Surgeon: Luis Dudley MD;  Location: [facility];  Service: Neurosurgery;  Laterality: N/A;      General Information       Row Name 25 1540          Physical Therapy Time and Intention    Document Type therapy note (daily note)  -EB     Mode of Treatment individual therapy;physical therapy  -EB       Row Name 25 1540          General Information    Patient Profile Reviewed yes  -EB     Existing Precautions/Restrictions fall;orthostatic hypotension;spinal;LSO;brace worn when out of bed  -EB       Row Name 25 1540       "    Cognition    Orientation Status (Cognition) oriented x 4  -EB       Row Name 03/21/25 1540          Safety Issues/Impairments Affecting Functional Mobility    Impairments Affecting Function (Mobility) balance;endurance/activity tolerance;strength;pain  -EB               User Key  (r) = Recorded By, (t) = Taken By, (c) = Cosigned By      Initials Name Provider Type    Elenita Seaman PTA Physical Therapist Assistant                   Mobility       Row Name 03/21/25 1540          Bed Mobility    Supine-Sit [place] (Bed Mobility) contact guard  -EB     Sit-Supine [place] (Bed Mobility) not tested  -EB     Assistive Device (Bed Mobility) bed rails;head of bed elevated  -EB     Comment, (Bed Mobility) cues for log roll  -EB       Row Name 03/21/25 1540          Sit-Stand Transfer    Sit-Stand [place] (Transfers) minimum assist (75% patient effort)  -EB     Assistive Device (Sit-Stand Transfers) walker, front-wheeled  -EB     Comment, (Sit-Stand Transfer) bed height slightly elevated  -EB       Row Name 03/21/25 1540          Gait/Stairs (Locomotion)    [place] Level (Gait) contact guard;minimum assist (75% patient effort)  -EB     Assistive Device (Gait) walker, front-wheeled  -EB     Distance in Feet (Gait) 30  X2  -EB     Deviations/Abnormal Patterns (Gait) gait speed decreased;antalgic;stride length decreased  -EB     Bilateral Gait Deviations forward flexed posture;heel strike decreased  -EB     Comment, (Gait/Stairs) cues for upright posture. no c/o dizziness or visual changes during activity. Pt c/o right toe pain.  -EB               User Key  (r) = Recorded By, (t) = Taken By, (c) = Cosigned By      Initials Name Provider Type    Elenita Seaman PTA Physical Therapist Assistant                   Obj/Interventions       Row Name 03/21/25 1553          Motor Skills    Therapeutic Exercise --  BLE: LAQs (X10)  -EB               User Key  (r) = Recorded By, (t) = Taken By, (c) = Cosigned By       Initials Name Provider Type    Elenita Seaman PTA Physical Therapist Assistant                   Goals/Plan    No documentation.                  Clinical Impression       Row Name 03/21/25 1546          Pain    Pain Location back  -EB       Row Name 03/21/25 1546          Plan of Care Review    Plan of Care Reviewed With patient  -EB     Progress improving  -EB     Outcome Evaluation Pt seen for PT tx today. Pt in bed but agreeable to participate. Pt required CGA with bed mobility with sequencing cues for log roll technique. Assisted pt with donning back brace while seated EOB. Pt had no complaints of dizziness or Light headedness throughout tx session. Pt stood with Faiza and ambulated 2X30ft with rwx, CGA/Faiza. Pt c/o parth weakness and right toe pain with gait. Pt demos antalgic gait and unsteadiness but no LOB. Will continue to follow pt for d/c needs. Pt will benefit from SNF to return to OF of being independent.  -       Row Name 03/21/25 1546          Therapy Assessment/Plan (PT)    Therapy Frequency (PT) 6 times/wk  -       Row Name 03/21/25 1546          Positioning and Restraints    Pre-Treatment Position in bed  -EB     Post Treatment Position chair  -EB     In Chair reclined;call light within reach;encouraged to call for assist;exit alarm on  -EB               User Key  (r) = Recorded By, (t) = Taken By, (c) = Cosigned By      Initials Name Provider Type    Elenita Seaman PTA Physical Therapist Assistant                   Outcome Measures       Row Name 03/21/25 1553 03/21/25 0809       How much help from another person do you currently need...    Turning from your back to your side while in flat bed without using bedrails? 3  -EB 3  -CC    Moving from lying on back to sitting on the side of a flat bed without bedrails? 3  -EB 3  -CC    Moving to and from a bed to a chair (including a wheelchair)? 3  -EB 3  -CC    Standing up from a chair using your arms (e.g., wheelchair, bedside chair)?  3  -EB 2  -CC    Climbing 3-5 steps with a railing? 2  -EB 2  -CC    To walk in hospital room? 3  -EB 2  -CC    AM-PAC 6 Clicks Score (PT) 17  -EB 15  -CC    Highest Level of Mobility Goal 5 --> Static standing  -EB 4 --> Transfer to chair/commode  -CC              User Key  (r) = Recorded By, (t) = Taken By, (c) = Cosigned By      Initials Name Provider Type    EB Elenita Schreiber PTA Physical Therapist Assistant    CC Wil Powell, RN Registered Nurse                                 Physical Therapy Education       Title: PT OT SLP Therapies (Done)       Topic: Physical Therapy (Done)       Point: Mobility training (Done)       Learning Progress Summary            Patient Acceptance, E,D, VU,NR by EB at 3/21/2025 1553    Acceptance, E,D, VU by EB at 3/20/2025 1641    Acceptance, E, VU,NR by MG at 3/19/2025 1608    Acceptance, E, VU by SM at 3/18/2025 1143                      Point: Home exercise program (Done)       Learning Progress Summary            Patient Acceptance, E,D, VU,NR by EB at 3/21/2025 1553    Acceptance, E, VU,NR by MG at 3/19/2025 1608    Acceptance, E, VU by SM at 3/18/2025 1143                      Point: Body mechanics (Done)       Learning Progress Summary            Patient Acceptance, E,D, VU,NR by EB at 3/21/2025 1553    Acceptance, E,D, VU by EB at 3/20/2025 1641    Acceptance, E, VU,NR by MG at 3/19/2025 1608    Acceptance, E, VU by SM at 3/18/2025 1143                      Point: Precautions (Done)       Learning Progress Summary            Patient Acceptance, E,D, VU,NR by EB at 3/21/2025 1553    Acceptance, E,D, VU by EB at 3/20/2025 1641    Acceptance, E, VU,NR by MG at 3/19/2025 1608    Acceptance, E, VU by SM at 3/18/2025 1143                                      User Key       Initials Effective Dates Name Provider Type Discipline     05/24/22 -  Jerica Wilburn, PT Physical Therapist PT    EB 02/14/23 -  Elenita Schreiber PTA Physical Therapist Assistant PT    PAULETTE 05/02/22 -   Kianna Lira, PT Physical Therapist PT                  PT Recommendation and Plan     Progress: improving  Outcome Evaluation: Pt seen for PT tx today. Pt in bed but agreeable to participate. Pt required CGA with bed mobility with sequencing cues for log roll technique. Assisted pt with donning back brace while seated EOB. Pt had no complaints of dizziness or Light headedness throughout tx session. Pt stood with Faiza and ambulated 2X30ft with rwx, CGA/Faiza. Pt c/o parth weakness and right toe pain with gait. Pt demos antalgic gait and unsteadiness but no LOB. Will continue to follow pt for d/c needs. Pt will benefit from SNF to return to OF of being independent.     Time Calculation:         PT Charges       Row Name 03/21/25 1539             Time Calculation    Start Time 1400  -EB      Stop Time 1415  -EB      Time Calculation (min) 15 min  -EB      PT Received On 03/21/25  -EB      PT - Next Appointment 03/22/25  -EB         Time Calculation- PT    Total Timed Code Minutes- PT 15 minute(s)  -EB                User Key  (r) = Recorded By, (t) = Taken By, (c) = Cosigned By      Initials Name Provider Type    EB Elenita Schreiber PTA Physical Therapist Assistant                  Therapy Charges for Today       Code Description Service Date Service Provider Modifiers Qty    58127854844 HC GAIT TRAINING EA 15 MIN 3/20/2025 Elenita Schreiber PTA GP 1    00193424197 HC PT THERAPEUTIC ACT EA 15 MIN 3/20/2025 Elenita Schreiber PTA GP 1    43066589520 HC PT THER SUPP EA 15 MIN 3/20/2025 Elenita Schreiber PTA GP 2    24678195307 HC GAIT TRAINING EA 15 MIN 3/21/2025 Elenita Schreiber PTA GP 1            PT G-Codes  Outcome Measure Options: AM-PAC 6 Clicks Basic Mobility (PT)  AM-PAC 6 Clicks Score (PT): 17  PT Discharge Summary  Anticipated Discharge Disposition (PT): skilled nursing facility    Elenita Schreiber PTA  3/21/2025

## 2025-03-22 ENCOUNTER — APPOINTMENT (OUTPATIENT)
Dept: GENERAL RADIOLOGY | Facility: HOSPITAL | Age: 85
DRG: 448 | End: 2025-03-22
Payer: MEDICARE

## 2025-03-22 LAB
ANION GAP SERPL CALCULATED.3IONS-SCNC: 7 MMOL/L (ref 5–15)
BASOPHILS # BLD AUTO: 0.05 10*3/MM3 (ref 0–0.2)
BASOPHILS NFR BLD AUTO: 0.5 % (ref 0–1.5)
BUN SERPL-MCNC: 14 MG/DL (ref 8–23)
BUN/CREAT SERPL: 17.9 (ref 7–25)
CALCIUM SPEC-SCNC: 8.5 MG/DL (ref 8.6–10.5)
CHLORIDE SERPL-SCNC: 101 MMOL/L (ref 98–107)
CO2 SERPL-SCNC: 28 MMOL/L (ref 22–29)
CREAT SERPL-MCNC: 0.78 MG/DL (ref 0.76–1.27)
DEPRECATED RDW RBC AUTO: 40.2 FL (ref 37–54)
EGFRCR SERPLBLD CKD-EPI 2021: 88.5 ML/MIN/1.73
EOSINOPHIL # BLD AUTO: 0.41 10*3/MM3 (ref 0–0.4)
EOSINOPHIL NFR BLD AUTO: 3.7 % (ref 0.3–6.2)
ERYTHROCYTE [DISTWIDTH] IN BLOOD BY AUTOMATED COUNT: 11.8 % (ref 12.3–15.4)
FERRITIN SERPL-MCNC: 505 NG/ML (ref 30–400)
GLUCOSE SERPL-MCNC: 111 MG/DL (ref 65–99)
HCT VFR BLD AUTO: 29.2 % (ref 37.5–51)
HGB BLD-MCNC: 9.5 G/DL (ref 13–17.7)
IMM GRANULOCYTES # BLD AUTO: 0.19 10*3/MM3 (ref 0–0.05)
IMM GRANULOCYTES NFR BLD AUTO: 1.7 % (ref 0–0.5)
IRON 24H UR-MRATE: 15 MCG/DL (ref 59–158)
IRON SATN MFR SERPL: 9 % (ref 20–50)
LYMPHOCYTES # BLD AUTO: 1.68 10*3/MM3 (ref 0.7–3.1)
LYMPHOCYTES NFR BLD AUTO: 15.2 % (ref 19.6–45.3)
MCH RBC QN AUTO: 30.8 PG (ref 26.6–33)
MCHC RBC AUTO-ENTMCNC: 32.5 G/DL (ref 31.5–35.7)
MCV RBC AUTO: 94.8 FL (ref 79–97)
MONOCYTES # BLD AUTO: 1.53 10*3/MM3 (ref 0.1–0.9)
MONOCYTES NFR BLD AUTO: 13.8 % (ref 5–12)
NEUTROPHILS NFR BLD AUTO: 65.1 % (ref 42.7–76)
NEUTROPHILS NFR BLD AUTO: 7.21 10*3/MM3 (ref 1.7–7)
NRBC BLD AUTO-RTO: 0 /100 WBC (ref 0–0.2)
PLATELET # BLD AUTO: 230 10*3/MM3 (ref 140–450)
PMV BLD AUTO: 9.1 FL (ref 6–12)
POTASSIUM SERPL-SCNC: 4.3 MMOL/L (ref 3.5–5.2)
RBC # BLD AUTO: 3.08 10*6/MM3 (ref 4.14–5.8)
SODIUM SERPL-SCNC: 136 MMOL/L (ref 136–145)
TIBC SERPL-MCNC: 164 MCG/DL (ref 298–536)
TRANSFERRIN SERPL-MCNC: 110 MG/DL (ref 200–360)
WBC NRBC COR # BLD AUTO: 11.07 10*3/MM3 (ref 3.4–10.8)

## 2025-03-22 PROCEDURE — 83540 ASSAY OF IRON: CPT | Performed by: STUDENT IN AN ORGANIZED HEALTH CARE EDUCATION/TRAINING PROGRAM

## 2025-03-22 PROCEDURE — 82728 ASSAY OF FERRITIN: CPT | Performed by: STUDENT IN AN ORGANIZED HEALTH CARE EDUCATION/TRAINING PROGRAM

## 2025-03-22 PROCEDURE — 73660 X-RAY EXAM OF TOE(S): CPT

## 2025-03-22 PROCEDURE — 84466 ASSAY OF TRANSFERRIN: CPT | Performed by: STUDENT IN AN ORGANIZED HEALTH CARE EDUCATION/TRAINING PROGRAM

## 2025-03-22 PROCEDURE — 80048 BASIC METABOLIC PNL TOTAL CA: CPT | Performed by: STUDENT IN AN ORGANIZED HEALTH CARE EDUCATION/TRAINING PROGRAM

## 2025-03-22 PROCEDURE — 25010000002 ENOXAPARIN PER 10 MG: Performed by: NURSE PRACTITIONER

## 2025-03-22 PROCEDURE — 99024 POSTOP FOLLOW-UP VISIT: CPT | Performed by: NURSE PRACTITIONER

## 2025-03-22 PROCEDURE — 97530 THERAPEUTIC ACTIVITIES: CPT

## 2025-03-22 PROCEDURE — 85025 COMPLETE CBC W/AUTO DIFF WBC: CPT | Performed by: STUDENT IN AN ORGANIZED HEALTH CARE EDUCATION/TRAINING PROGRAM

## 2025-03-22 RX ORDER — HYDROCODONE BITARTRATE AND ACETAMINOPHEN 5; 325 MG/1; MG/1
1 TABLET ORAL EVERY 4 HOURS PRN
Refills: 0 | Status: DISCONTINUED | OUTPATIENT
Start: 2025-03-22 | End: 2025-03-24 | Stop reason: HOSPADM

## 2025-03-22 RX ORDER — COLCHICINE 0.6 MG/1
0.6 TABLET ORAL EVERY 12 HOURS SCHEDULED
Status: DISCONTINUED | OUTPATIENT
Start: 2025-03-22 | End: 2025-03-24 | Stop reason: HOSPADM

## 2025-03-22 RX ADMIN — SENNOSIDES AND DOCUSATE SODIUM 2 TABLET: 50; 8.6 TABLET ORAL at 09:11

## 2025-03-22 RX ADMIN — SENNOSIDES AND DOCUSATE SODIUM 2 TABLET: 50; 8.6 TABLET ORAL at 20:57

## 2025-03-22 RX ADMIN — HYDROCODONE BITARTRATE AND ACETAMINOPHEN 1 TABLET: 5; 325 TABLET ORAL at 12:50

## 2025-03-22 RX ADMIN — METHOCARBAMOL TABLETS 500 MG: 500 TABLET, COATED ORAL at 11:16

## 2025-03-22 RX ADMIN — HYDROCODONE BITARTRATE AND ACETAMINOPHEN 1 TABLET: 5; 325 TABLET ORAL at 05:22

## 2025-03-22 RX ADMIN — BISACODYL 5 MG: 5 TABLET, COATED ORAL at 16:53

## 2025-03-22 RX ADMIN — Medication 3 ML: at 09:30

## 2025-03-22 RX ADMIN — PREGABALIN 100 MG: 100 CAPSULE ORAL at 20:49

## 2025-03-22 RX ADMIN — ZOLPIDEM TARTRATE 5 MG: 5 TABLET, FILM COATED ORAL at 21:50

## 2025-03-22 RX ADMIN — POLYETHYLENE GLYCOL 3350 17 G: 17 POWDER, FOR SOLUTION ORAL at 16:53

## 2025-03-22 RX ADMIN — ENOXAPARIN SODIUM 40 MG: 100 INJECTION SUBCUTANEOUS at 12:50

## 2025-03-22 RX ADMIN — Medication 3 ML: at 20:59

## 2025-03-22 RX ADMIN — METHOCARBAMOL TABLETS 500 MG: 500 TABLET, COATED ORAL at 05:22

## 2025-03-22 RX ADMIN — COLCHICINE 0.6 MG: 0.6 TABLET ORAL at 18:53

## 2025-03-22 RX ADMIN — ATORVASTATIN CALCIUM 10 MG: 20 TABLET, FILM COATED ORAL at 20:50

## 2025-03-22 RX ADMIN — HYDROCODONE BITARTRATE AND ACETAMINOPHEN 1 TABLET: 5; 325 TABLET ORAL at 16:51

## 2025-03-22 RX ADMIN — ACETAMINOPHEN 500 MG: 500 TABLET, FILM COATED ORAL at 09:05

## 2025-03-22 RX ADMIN — HYDROCODONE BITARTRATE AND ACETAMINOPHEN 1 TABLET: 5; 325 TABLET ORAL at 09:05

## 2025-03-22 RX ADMIN — METOPROLOL SUCCINATE 25 MG: 25 TABLET, EXTENDED RELEASE ORAL at 20:49

## 2025-03-22 RX ADMIN — PREGABALIN 100 MG: 100 CAPSULE ORAL at 09:05

## 2025-03-22 RX ADMIN — METHOCARBAMOL TABLETS 500 MG: 500 TABLET, COATED ORAL at 18:55

## 2025-03-22 RX ADMIN — HYDROCODONE BITARTRATE AND ACETAMINOPHEN 1 TABLET: 5; 325 TABLET ORAL at 20:49

## 2025-03-22 NOTE — PLAN OF CARE
Goal Outcome Evaluation:  Plan of Care Reviewed With: patient        Progress: improving  Outcome Evaluation: Pt doing well from [facility] standpoing. BP maintained from supine to sit to stand and no complaints of dizziness. PT tech did follow with chair to ensure safety with progression of ambulation. Pt required min A for supine to sit and STS from EOB. VCs for log roll. CGA for gait with RW for distance of 80'. Pt returned to chair. He complains of R GT pain with obvious inflammation at 1st MTP joint.    Anticipated Discharge Disposition (PT): skilled nursing facility

## 2025-03-22 NOTE — NURSING NOTE
"Call received from Dr. Joya regarding consult placed for \"right first toe swelling, erythema, tenderness, no gout history\" and patient's status discussed. Order received to consult podiatry to assume care for toe concern.    "

## 2025-03-22 NOTE — PROGRESS NOTES
Name: Olu Izquierdo ADMIT: 3/17/2025   : 1940  PCP: Edenilson Henry MD    MRN: [id number] LOS: 5 days   AGE/SEX: 84 y.o. male  ROOM: [facility]     Subjective   Subjective     He's complaining of right first toe pain this afternoon. He states that it has been present to some degree the entire duration of his symptoms related to his back. He isn't sure whether there previously has been swelling or erythema of the toe. No gout history.       Objective   Objective   Vital Signs  Temp:  [97.5 °F (36.4 °C)-98.8 °F (37.1 °C)] 97.5 °F (36.4 °C)  Heart Rate:  [84-93] 93  Resp:  [16] 16  BP: (125-151)/(54-69) 145/62  SpO2:  [92 %-98 %] 98 %  on   ;   Device (Oxygen Therapy): room air  Body mass index is 26.31 kg/m².  Physical Exam  Constitutional:       General: He is not in acute distress.     Appearance: He is not toxic-appearing.   Cardiovascular:      Rate and Rhythm: Normal rate and regular rhythm.      Heart sounds: Normal heart sounds.   Pulmonary:      Effort: Pulmonary effort is normal.      Breath sounds: Normal breath sounds.   Abdominal:      General: Bowel sounds are normal.      Palpations: Abdomen is soft.   Musculoskeletal:         General: Swelling (right MTP joint of the first toe with erythema, swelling, tenderness) present.      Right lower leg: No edema.      Left lower leg: No edema.   Neurological:      Mental Status: He is alert.   Psychiatric:         Mood and Affect: Mood normal.         Behavior: Behavior normal.         Results Review     I reviewed the patient's new clinical results.  Results from last 7 days   Lab Units 25  0525  0346   WBC 10*3/mm3 11.07* 11.34* 14.66* 10.81*   HEMOGLOBIN g/dL 9.5* 9.4* 10.3* 10.2*   PLATELETS 10*3/mm3 230 196 199 164     Results from last 7 days   Lab Units 258 25  0518 25  0529 25  0346   SODIUM mmol/L 136 137 134* 133*   POTASSIUM mmol/L 4.3 3.8 4.0 4.1   CHLORIDE mmol/L 101  "103 96* 99   CO2 mmol/L 28.0 27.1 26.3 24.0   BUN mg/dL 14 12 12 11   CREATININE mg/dL 0.78 0.66* 0.73* 0.82   GLUCOSE mg/dL 111* 118* 124* 116*   Estimated Creatinine Clearance: 89.3 mL/min (by C-G formula based on SCr of 0.78 mg/dL).    Results from last 7 days   Lab Units 03/22/25  0418 03/21/25  0518 03/20/25  0529 03/19/25  0346   CALCIUM mg/dL 8.5* 8.1* 8.8 8.4*     No results found for: \"COVID19\"  No results found for: \"HGBA1C\", \"POCGLU\"    XR Abdomen KUB  Narrative: KUB     HISTORY: Abdominal pain, constipation     COMPARISON: None available     FINDINGS: The bowel gas pattern is nonobstructive. There is no  abnormally increased stool burden. Lung bases are clear. Postsurgical  changes of the lumbar spine     Impression: As above           This report was finalized on 3/19/2025 3:10 PM by Dr. Сергей Ponce M.D on Workstation: [id number]       Scheduled Medications  acetaminophen, 500 mg, Oral, Daily  atorvastatin, 10 mg, Oral, Nightly  enoxaparin sodium, 40 mg, Subcutaneous, Q24H  [Held by provider] hydrALAZINE, 25 mg, Oral, BID  [Held by provider] hydroCHLOROthiazide, 12.5 mg, Oral, Daily  melatonin, 10 mg, Oral, Nightly  methocarbamol, 500 mg, Oral, Q8H  [Held by provider] metoprolol succinate XL, 25 mg, Oral, Nightly  pregabalin, 100 mg, Oral, BID  sodium chloride, 3 mL, Intravenous, Q12H  [Held by provider] spironolactone, 50 mg, Oral, Daily    Infusions   Diet  Diet: Regular/House; Fluid Consistency: Thin (IDDSI 0)       Assessment/Plan     Active Hospital Problems    Diagnosis  POA    **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       84 y.o. male admitted with Spinal stenosis of lumbar region with neurogenic claudication.    Lumbar spinal stenosis with neurogenic claudication-s/p L2-3 and L3-4 decompression and fusion on " 3/17/25.   Essential hypertension-unhold metoprolol and spironolactone today  Hyperlipidemia-statin  Right first toe swelling, erythema, tenderness around the first MTP joint-suspicious for gout. No prior history. Will check an xray, uric acid level, and ask orthopedic surgery to see him for an arthrocentesis to establish the diagnosis. Would not restart hctz if this is gout. Will empirically start colchicine   Leukocytosis-mild elevation which has been present since admission. He has a bit of an eosinophilia today which is new. Continue to monitor.  Anemia-check iron studies, b12, folate  Lovenox 40 mg SC daily for DVT prophylaxis.  Full code.  Discussed with patient, family, and consulting provider.  Anticipate discharge to SNU facility timing yet to be determined      Odin Mcgraw MD  [facility]  03/22/25  16:53 EDT

## 2025-03-22 NOTE — PLAN OF CARE
Problem: Adult Inpatient Plan of Care  Goal: Plan of Care Review  Outcome: Progressing  Goal: Patient-Specific Goal (Individualized)  Outcome: Progressing  Goal: Absence of Hospital-Acquired Illness or Injury  Outcome: Progressing  Intervention: Identify and Manage Fall Risk  Recent Flowsheet Documentation  Taken 3/21/2025 2006 by Luis Villagomez RN  Safety Promotion/Fall Prevention:   activity supervised   safety round/check completed  Intervention: Prevent Skin Injury  Recent Flowsheet Documentation  Taken 3/21/2025 2006 by Luis Villagomez RN  Body Position: position changed independently  Skin Protection: incontinence pads utilized  Intervention: Prevent and Manage VTE (Venous Thromboembolism) Risk  Recent Flowsheet Documentation  Taken 3/21/2025 2006 by Luis Villagomez RN  VTE Prevention/Management:   bilateral   SCDs (sequential compression devices) on  Intervention: Prevent Infection  Recent Flowsheet Documentation  Taken 3/21/2025 2006 by Luis Villagomez RN  Infection Prevention: environmental surveillance performed  Goal: Optimal Comfort and Wellbeing  Outcome: Progressing  Intervention: Monitor Pain and Promote Comfort  Recent Flowsheet Documentation  Taken 3/21/2025 2006 by Luis Villagomez RN  Pain Management Interventions: pain medication given  Goal: Readiness for Transition of Care  Outcome: Progressing     Problem: Infection  Goal: Absence of Infection Signs and Symptoms  Outcome: Progressing  Intervention: Prevent or Manage Infection  Recent Flowsheet Documentation  Taken 3/21/2025 2006 by Luis Villagomez RN  Fever Reduction/Comfort Measures: ice pack(s) applied     Problem: Pain Acute  Goal: Optimal Pain Control and Function  Outcome: Progressing  Intervention: Develop Pain Management Plan  Recent Flowsheet Documentation  Taken 3/21/2025 2006 by Luis Villagomez RN  Pain Management Interventions: pain medication given     Problem: Spinal Surgery  Goal: Optimal Coping with Surgery  Outcome:  Progressing  Goal: Absence of Bleeding  Outcome: Progressing  Goal: Effective Bowel Elimination  Outcome: Progressing  Goal: Fluid and Electrolyte Balance  Outcome: Progressing  Goal: Optimal Functional Ability  Outcome: Progressing  Intervention: Optimize Functional Status  Recent Flowsheet Documentation  Taken 3/21/2025 2006 by Luis Villagomez RN  Activity Management: activity encouraged  Positioning/Transfer Devices:   pillows   in use  Goal: Absence of Infection Signs and Symptoms  Outcome: Progressing  Intervention: Prevent or Manage Infection  Recent Flowsheet Documentation  Taken 3/21/2025 2006 by Luis Villagomez RN  Fever Reduction/Comfort Measures: ice pack(s) applied  Infection Prevention: environmental surveillance performed  Goal: Optimal Neurologic Function  Outcome: Progressing  Intervention: Optimize Neurologic Function  Recent Flowsheet Documentation  Taken 3/21/2025 2006 by Luis Villagomez RN  Body Position: position changed independently  Pressure Reduction Devices: positioning supports utilized  Goal: Anesthesia/Sedation Recovery  Outcome: Progressing  Intervention: Optimize Anesthesia Recovery  Recent Flowsheet Documentation  Taken 3/21/2025 2006 by Luis Villagomez RN  Patient Tolerance (IS): fair  Safety Promotion/Fall Prevention:   activity supervised   safety round/check completed  Goal: Optimal Pain Control and Function  Outcome: Progressing  Intervention: Prevent or Manage Pain  Recent Flowsheet Documentation  Taken 3/21/2025 2006 by Luis Villagomez RN  Pain Management Interventions: pain medication given  Goal: Nausea and Vomiting Relief  Outcome: Progressing  Goal: Effective Urinary Elimination  Outcome: Progressing  Goal: Effective Oxygenation and Ventilation  Outcome: Progressing  Intervention: Optimize Oxygenation and Ventilation  Recent Flowsheet Documentation  Taken 3/21/2025 2006 by Luis Villagomez RN  Head of Bed (HOB) Positioning: HOB at 15 degrees     Problem: Fall Injury  Risk  Goal: Absence of Fall and Fall-Related Injury  Outcome: Progressing  Intervention: Promote Injury-Free Environment  Recent Flowsheet Documentation  Taken 3/21/2025 2006 by Luis Villagomez RN  Safety Promotion/Fall Prevention:   activity supervised   safety round/check completed     Problem: Skin Injury Risk Increased  Goal: Skin Health and Integrity  Outcome: Progressing  Intervention: Optimize Skin Protection  Recent Flowsheet Documentation  Taken 3/21/2025 2006 by Luis Villagomez, RN  Activity Management: activity encouraged  Pressure Reduction Techniques: frequent weight shift encouraged  Head of Bed (HOB) Positioning: HOB at 15 degrees  Pressure Reduction Devices: positioning supports utilized  Skin Protection: incontinence pads utilized   Goal Outcome Evaluation:

## 2025-03-22 NOTE — THERAPY TREATMENT NOTE
"Patient Name: Olu Izquierdo  : 1940    MRN: [id number]                              Today's Date: 3/22/2025       Admit Date: 3/17/2025    Visit Dx:     ICD-10-CM ICD-9-CM   1. Spinal stenosis of lumbar region with neurogenic claudication  M48.062 724.03     Patient Active Problem List   Diagnosis    Essential hypertension    Hypercholesterolemia    Lower urinary tract infectious disease    Spinal stenosis of lumbar region with neurogenic claudication    Mild episode of recurrent major depressive disorder    Neuropathy     Past Medical History:   Diagnosis Date    Anesthesia complication     HAD DIFFICULTY URINATING AFTER SURGERY    Arthritis     Hyperlipidemia     Hypertension     Injury of back     Back surgery 1984 partial discectomy with \"sciatic nerve damage\"    Low back pain     Neuropathy     BILAT FEET    Numbness     left knee    Spinal stenosis     LUMBAR     Past Surgical History:   Procedure Laterality Date    CATARACT EXTRACTION, BILATERAL      EPIDURAL BLOCK      EYE SURGERY      LUMBAR DISCECTOMY      LUMBAR DISCECTOMY Right 2019    Procedure: Right Lumbar two-three laminectomy with metrix;  Surgeon: Luis Dudley MD;  Location: [facility];  Service: Neurosurgery    LUMBAR DISCECTOMY FUSION INSTRUMENTATION N/A 3/17/2025    Procedure: Lumbar 2 to lumbar 3 and lumbar 3 to lumbar 4 laminectomy with neurolysis with fusion and instrumentation;  Surgeon: Luis Dudley MD;  Location: [facility];  Service: Neurosurgery;  Laterality: N/A;      General Information       Row Name 25 1248          Physical Therapy Time and Intention    Document Type therapy note (daily note)  -LB     Mode of Treatment individual therapy;physical therapy  -LB       Row Name 25 1248          General Information    Patient Profile Reviewed yes  -LB     Existing Precautions/Restrictions fall;orthostatic hypotension;spinal;LSO;brace worn when out of bed  -LB       Row Name 25 1248       "    Cognition    Orientation Status (Cognition) oriented x 4  -LB       Row Name 03/22/25 1248          Safety Issues/Impairments Affecting Functional Mobility    Impairments Affecting Function (Mobility) balance;endurance/activity tolerance;strength;pain  -LB     Comment, Safety Issues/Impairments (Mobility) gait belt and non-skid socks donned; PT tech present due to orthostatics to follow with chair  -LB               User Key  (r) = Recorded By, (t) = Taken By, (c) = Cosigned By      Initials Name Provider Type    Ibis Portillo PT Physical Therapist                   Mobility       Row Name 03/22/25 1250          Bed Mobility    Supine-Sit [place] (Bed Mobility) minimum assist (75% patient effort);other (see comments)  cued for log roll  -LB     Sit-Supine [place] (Bed Mobility) not tested  -LB     Assistive Device (Bed Mobility) bed rails;head of bed elevated  -LB       Row Name 03/22/25 1250          Transfers    Comment, (Transfers) supine /49; seated 126/62; standing 120/64, no complaints of dizziness  -LB       Row Name 03/22/25 1250          Sit-Stand Transfer    Sit-Stand [place] (Transfers) minimum assist (75% patient effort)  -LB     Assistive Device (Sit-Stand Transfers) walker, front-wheeled  -LB       Row Name 03/22/25 1250          Gait/Stairs (Locomotion)    [place] Level (Gait) contact guard;minimum assist (75% patient effort)  -LB     Assistive Device (Gait) walker, front-wheeled  -LB     Patient was able to Ambulate yes  -LB     Distance in Feet (Gait) 80  -LB     Deviations/Abnormal Patterns (Gait) gait speed decreased;antalgic;stride length decreased  -LB     Bilateral Gait Deviations forward flexed posture;heel strike decreased  -LB     Comment, (Gait/Stairs) no complaints of dizziness, only complains of R GT pain resulting in dec rollover  -LB               User Key  (r) = Recorded By, (t) = Taken By, (c) = Cosigned By      Initials Name Provider Type    JAMILA Frey  Ibis, PT Physical Therapist                   Obj/Interventions    No documentation.                  Goals/Plan    No documentation.                  Clinical Impression       Row Name 03/22/25 1253          Pain    Pretreatment Pain Rating 5/10  -LB     Posttreatment Pain Rating 5/10  -LB     Pain Location other (see comments)  toe  -LB     Pain Management Interventions exercise or physical activity utilized  -LB     Response to Pain Interventions activity participation with tolerable pain  -LB       Row Name 03/22/25 1253          Plan of Care Review    Plan of Care Reviewed With patient  -LB     Progress improving  -LB     Outcome Evaluation Pt doing well from diziness standpoing. BP maintained from supine to sit to stand and no complaints of dizziness. PT tech did follow with chair to ensure safety with progression of ambulation. Pt required min A for supine to sit and STS from EOB. VCs for log roll. CGA for gait with RW for distance of 80'. Pt returned to chair. He complains of R GT pain with obvious inflammation at 1st MTP joint.  -LB       Row Name 03/22/25 1253          Vital Signs    Pre Patient Position Supine  -LB     Intra Patient Position Standing  -LB     Post Patient Position Sitting  -LB       Row Name 03/22/25 1253          Positioning and Restraints    Pre-Treatment Position in bed  -LB     Post Treatment Position chair  -LB     In Chair sitting;call light within reach;reclined;encouraged to call for assist  -LB               User Key  (r) = Recorded By, (t) = Taken By, (c) = Cosigned By      Initials Name Provider Type    LB Ibis Frey, PT Physical Therapist                   Outcome Measures       Row Name 03/22/25 1255 03/22/25 0903       How much help from another person do you currently need...    Turning from your back to your side while in flat bed without using bedrails? 3  -LB 3  -ES    Moving from lying on back to sitting on the side of a flat bed without bedrails? 3  -LB 3  -ES     Moving to and from a bed to a chair (including a wheelchair)? 3  -LB 3  -ES    Standing up from a chair using your arms (e.g., wheelchair, bedside chair)? 3  -LB 3  -ES    Climbing 3-5 steps with a railing? 2  -LB 2  -ES    To walk in hospital room? 3  -LB 3  -ES    AM-PAC 6 Clicks Score (PT) 17  -LB 17  -ES    Highest Level of Mobility Goal 5 --> Static standing  -LB 5 --> Static standing  -ES      Row Name 03/22/25 1255          Functional Assessment    Outcome Measure Options AM-PAC 6 Clicks Basic Mobility (PT)  -LB               User Key  (r) = Recorded By, (t) = Taken By, (c) = Cosigned By      Initials Name Provider Type    Ibis Portillo, PT Physical Therapist    Lilly Byers, RN Registered Nurse                                 Physical Therapy Education       Title: PT OT SLP Therapies (Done)       Topic: Physical Therapy (Done)       Point: Mobility training (Done)       Learning Progress Summary            Patient Acceptance, E,TB,D, DU by LB at 3/22/2025 1257    Acceptance, E,D, VU,NR by EB at 3/21/2025 1553    Acceptance, E,D, VU by EB at 3/20/2025 1641    Acceptance, E, VU,NR by MG at 3/19/2025 1608    Acceptance, E, VU by SM at 3/18/2025 1143                      Point: Home exercise program (Done)       Learning Progress Summary            Patient Acceptance, E,TB,D, DU by LB at 3/22/2025 1257    Acceptance, E,D, VU,NR by EB at 3/21/2025 1553    Acceptance, E, VU,NR by MG at 3/19/2025 1608    Acceptance, E, VU by SM at 3/18/2025 1143                      Point: Body mechanics (Done)       Learning Progress Summary            Patient Acceptance, E,TB,D, DU by LB at 3/22/2025 1257    Acceptance, E,D, VU,NR by EB at 3/21/2025 1553    Acceptance, E,D, VU by EB at 3/20/2025 1641    Acceptance, E, VU,NR by MG at 3/19/2025 1608    Acceptance, E, VU by SM at 3/18/2025 1143                      Point: Precautions (Done)       Learning Progress Summary            Patient Acceptance, E,TB,D, DU by  LB at 3/22/2025 1257    Acceptance, E,D, VU,NR by EB at 3/21/2025 1553    Acceptance, E,D, VU by EB at 3/20/2025 1641    Acceptance, E, VU,NR by MG at 3/19/2025 1608    Acceptance, E, VU by SM at 3/18/2025 1143                                      User Key       Initials Effective Dates Name Provider Type Discipline    MG 05/24/22 -  Jerica Wilburn, PT Physical Therapist PT    LB 08/09/20 -  Ibis Frey, PT Physical Therapist PT    EB 02/14/23 -  Elenita Schreiber, PTA Physical Therapist Assistant PT    SM 05/02/22 -  Kianna Lira, PT Physical Therapist PT                  PT Recommendation and Plan     Progress: improving  Outcome Evaluation: Pt doing well from diziness standpoing. BP maintained from supine to sit to stand and no complaints of dizziness. PT tech did follow with chair to ensure safety with progression of ambulation. Pt required min A for supine to sit and STS from EOB. VCs for log roll. CGA for gait with RW for distance of 80'. Pt returned to chair. He complains of R GT pain with obvious inflammation at 1st MTP joint.     Time Calculation:         PT Charges       Row Name 03/22/25 1257             Time Calculation    Start Time 0920  -LB      Stop Time 0948  -LB      Time Calculation (min) 28 min  -LB      PT Received On 03/22/25  -LB      PT - Next Appointment 03/23/25  -LB         Time Calculation- PT    Total Timed Code Minutes- PT 23 minute(s)  -LB                User Key  (r) = Recorded By, (t) = Taken By, (c) = Cosigned By      Initials Name Provider Type    LB Ibis Frey, PT Physical Therapist                  Therapy Charges for Today       Code Description Service Date Service Provider Modifiers Qty    07822457731 HC PT THERAPEUTIC ACT EA 15 MIN 3/22/2025 Ibis Frey, PT GP 2    58959489141 HC PT THER SUPP EA 15 MIN 3/22/2025 Ibis Frey, PT GP 1            PT G-Codes  Outcome Measure Options: AM-PAC 6 Clicks Basic Mobility (PT)  AM-PAC 6 Clicks Score (PT): 17  PT Discharge  Summary  Anticipated Discharge Disposition (PT): skilled nursing facility    Ibis Frey, PT  3/22/2025

## 2025-03-22 NOTE — PLAN OF CARE
Goal Outcome Evaluation:  Plan of Care Reviewed With: patient        Progress: improving  Outcome Evaluation: Care assumed at 1500. VSS and voiding function is intact; patient with positive bowel sounds and passing flatus, bowel regimen advanced. Pain managed with prn norco and ice for lumbar incision. Patient with pain, redness and edema to R great toe, podiatry consulted, labs ordered and colchicine started. Patient able to ambulate with walker and x1 assist. Plan for d/c to [facility], bed available tomorrow but unsure of d/c disposition d/t R toe work up, CCP following.

## 2025-03-22 NOTE — PROGRESS NOTES
[facility] THORACIC/LUMBAR NEUROSURGERY PROGRESS NOTE     CC: POD # 5 s/p L2-4 laminectomy with fusion       Subjective     Interval History: No events overnight.  Still with right big toe pain.        Objective     Vital signs in last 24 hours:  Temp:  [97.9 °F (36.6 °C)-98.8 °F (37.1 °C)] 98.8 °F (37.1 °C)  Heart Rate:  [84-93] 88  Resp:  [16] 16  BP: (125-151)/(54-69) 125/54    Intake/Output this shift:  I/O this shift:  In: 360 [P.O.:360]  Out: 400 [Urine:400]    LABS:  Results from last 7 days   Lab Units 03/22/25 0418 03/21/25  0518 03/20/25  0529   WBC 10*3/mm3 11.07* 11.34* 14.66*   HEMOGLOBIN g/dL 9.5* 9.4* 10.3*   HEMATOCRIT % 29.2* 28.7* 30.2*   PLATELETS 10*3/mm3 230 196 199     Results from last 7 days   Lab Units 03/22/25 0418 03/21/25  0518 03/20/25  0529   SODIUM mmol/L 136 137 134*   POTASSIUM mmol/L 4.3 3.8 4.0   CHLORIDE mmol/L 101 103 96*   CO2 mmol/L 28.0 27.1 26.3   BUN mg/dL 14 12 12   CREATININE mg/dL 0.78 0.66* 0.73*   GLUCOSE mg/dL 111* 118* 124*   CALCIUM mg/dL 8.5* 8.1* 8.8           IMAGING STUDIES:  No new imaging to review    I personally viewed the patient's chart, it was also reviewed by and discussed with Dr Luis Enrique Granado reviewed/changed: Yes       Physical Exam:    General:  Awake & alert  Back: Midline lumbar surgical incision is well-appearing, well-approximated with Steri-Strips in place.  No surrounding erythema, swelling or drainage.  Motor:  Normal muscle strength, bulk and tone in lower extremities.    Sensation:  Normal to light touch bilateral LE's  Station and Gait: Deferred  Extremities:  Wearing SCD's.  No calf tenderness or swelling noted bilaterally.  Right big toe swelling, some erythema and warm to touch      Assessment & Plan     ASSESSMENT:      Spinal stenosis of lumbar region with neurogenic claudication    Essential hypertension    Hypercholesterolemia    POD # 5 s/p L2-4 laminectomy with fusion     PLAN:   -Mobilize  -Lovenox for DVT prophylaxis  -Patient can  "be discharged to rehab whenever a bed is available  -Ask hospitalist to address right big toe pain/redness/swelling. patient denies history of gout.      I discussed the patient's findings and my recommendations with patient and nursing staff.      I spent 35 minutes caring for Olu Izquierdo on this date of service. This time includes time spent by me in the following activities: preparing for the visit, reviewing tests, obtaining and/or reviewing a separately obtained history, performing a medically appropriate examination and/or evaluation, counseling and educating the patient/family/caregiver, ordering medications, tests, or procedures, referring and communicating with other health care professionals, documenting information in the medical record, independently interpreting results and communicating that information with the patient/family/caregiver, and care coordination          LOS: 5 days       Kriss Woods, APRN  3/22/2025  09:57 EDT    \"Dictated utilizing Dragon dictation\".      "

## 2025-03-23 VITALS
HEART RATE: 80 BPM | OXYGEN SATURATION: 98 % | SYSTOLIC BLOOD PRESSURE: 130 MMHG | TEMPERATURE: 98.2 F | DIASTOLIC BLOOD PRESSURE: 74 MMHG | RESPIRATION RATE: 16 BRPM | BODY MASS INDEX: 26.28 KG/M2 | HEIGHT: 72 IN | WEIGHT: 194 LBS

## 2025-03-23 LAB
ANION GAP SERPL CALCULATED.3IONS-SCNC: 8 MMOL/L (ref 5–15)
BASOPHILS # BLD AUTO: 0.07 10*3/MM3 (ref 0–0.2)
BASOPHILS NFR BLD AUTO: 0.7 % (ref 0–1.5)
BUN SERPL-MCNC: 14 MG/DL (ref 8–23)
BUN/CREAT SERPL: 19.4 (ref 7–25)
CALCIUM SPEC-SCNC: 8.6 MG/DL (ref 8.6–10.5)
CHLORIDE SERPL-SCNC: 100 MMOL/L (ref 98–107)
CO2 SERPL-SCNC: 27 MMOL/L (ref 22–29)
CREAT SERPL-MCNC: 0.72 MG/DL (ref 0.76–1.27)
DEPRECATED RDW RBC AUTO: 39.1 FL (ref 37–54)
EGFRCR SERPLBLD CKD-EPI 2021: 90.7 ML/MIN/1.73
EOSINOPHIL # BLD AUTO: 0.5 10*3/MM3 (ref 0–0.4)
EOSINOPHIL NFR BLD AUTO: 4.7 % (ref 0.3–6.2)
ERYTHROCYTE [DISTWIDTH] IN BLOOD BY AUTOMATED COUNT: 11.7 % (ref 12.3–15.4)
FOLATE SERPL-MCNC: >20 NG/ML (ref 4.78–24.2)
GLUCOSE SERPL-MCNC: 108 MG/DL (ref 65–99)
HCT VFR BLD AUTO: 28.7 % (ref 37.5–51)
HGB BLD-MCNC: 9.6 G/DL (ref 13–17.7)
IMM GRANULOCYTES # BLD AUTO: 0.21 10*3/MM3 (ref 0–0.05)
IMM GRANULOCYTES NFR BLD AUTO: 2 % (ref 0–0.5)
LYMPHOCYTES # BLD AUTO: 1.49 10*3/MM3 (ref 0.7–3.1)
LYMPHOCYTES NFR BLD AUTO: 14.1 % (ref 19.6–45.3)
MCH RBC QN AUTO: 31.4 PG (ref 26.6–33)
MCHC RBC AUTO-ENTMCNC: 33.4 G/DL (ref 31.5–35.7)
MCV RBC AUTO: 93.8 FL (ref 79–97)
MONOCYTES # BLD AUTO: 1.51 10*3/MM3 (ref 0.1–0.9)
MONOCYTES NFR BLD AUTO: 14.3 % (ref 5–12)
NEUTROPHILS NFR BLD AUTO: 6.76 10*3/MM3 (ref 1.7–7)
NEUTROPHILS NFR BLD AUTO: 64.2 % (ref 42.7–76)
NRBC BLD AUTO-RTO: 0 /100 WBC (ref 0–0.2)
PLATELET # BLD AUTO: 285 10*3/MM3 (ref 140–450)
PMV BLD AUTO: 8.8 FL (ref 6–12)
POTASSIUM SERPL-SCNC: 4.1 MMOL/L (ref 3.5–5.2)
RBC # BLD AUTO: 3.06 10*6/MM3 (ref 4.14–5.8)
SODIUM SERPL-SCNC: 135 MMOL/L (ref 136–145)
URATE SERPL-MCNC: 4.2 MG/DL (ref 3.4–7)
VIT B12 BLD-MCNC: 573 PG/ML (ref 211–946)
WBC NRBC COR # BLD AUTO: 10.54 10*3/MM3 (ref 3.4–10.8)

## 2025-03-23 PROCEDURE — 85025 COMPLETE CBC W/AUTO DIFF WBC: CPT | Performed by: STUDENT IN AN ORGANIZED HEALTH CARE EDUCATION/TRAINING PROGRAM

## 2025-03-23 PROCEDURE — 99024 POSTOP FOLLOW-UP VISIT: CPT | Performed by: NURSE PRACTITIONER

## 2025-03-23 PROCEDURE — 84550 ASSAY OF BLOOD/URIC ACID: CPT | Performed by: STUDENT IN AN ORGANIZED HEALTH CARE EDUCATION/TRAINING PROGRAM

## 2025-03-23 PROCEDURE — 80048 BASIC METABOLIC PNL TOTAL CA: CPT | Performed by: STUDENT IN AN ORGANIZED HEALTH CARE EDUCATION/TRAINING PROGRAM

## 2025-03-23 PROCEDURE — 82746 ASSAY OF FOLIC ACID SERUM: CPT | Performed by: STUDENT IN AN ORGANIZED HEALTH CARE EDUCATION/TRAINING PROGRAM

## 2025-03-23 PROCEDURE — 25010000002 ENOXAPARIN PER 10 MG: Performed by: NURSE PRACTITIONER

## 2025-03-23 PROCEDURE — 82607 VITAMIN B-12: CPT | Performed by: STUDENT IN AN ORGANIZED HEALTH CARE EDUCATION/TRAINING PROGRAM

## 2025-03-23 RX ORDER — HYDROCODONE BITARTRATE AND ACETAMINOPHEN 5; 325 MG/1; MG/1
1 TABLET ORAL EVERY 4 HOURS PRN
Qty: 25 TABLET | Refills: 0 | Status: SHIPPED | OUTPATIENT
Start: 2025-03-23 | End: 2025-03-27

## 2025-03-23 RX ORDER — METHOCARBAMOL 500 MG/1
500 TABLET, FILM COATED ORAL EVERY 8 HOURS SCHEDULED
Qty: 40 TABLET | Refills: 0 | Status: SHIPPED | OUTPATIENT
Start: 2025-03-23

## 2025-03-23 RX ORDER — POLYETHYLENE GLYCOL 3350 17 G/17G
17 POWDER, FOR SOLUTION ORAL DAILY PRN
Qty: 20 EACH | Refills: 0 | Status: SHIPPED | OUTPATIENT
Start: 2025-03-23

## 2025-03-23 RX ORDER — AMOXICILLIN 250 MG
2 CAPSULE ORAL 2 TIMES DAILY PRN
Qty: 30 TABLET | Refills: 0 | Status: SHIPPED | OUTPATIENT
Start: 2025-03-23

## 2025-03-23 RX ORDER — ONDANSETRON 4 MG/1
4 TABLET, ORALLY DISINTEGRATING ORAL EVERY 6 HOURS PRN
Qty: 24 TABLET | Refills: 0 | Status: SHIPPED | OUTPATIENT
Start: 2025-03-23

## 2025-03-23 RX ADMIN — HYDROCODONE BITARTRATE AND ACETAMINOPHEN 1 TABLET: 5; 325 TABLET ORAL at 09:09

## 2025-03-23 RX ADMIN — COLCHICINE 0.6 MG: 0.6 TABLET ORAL at 09:09

## 2025-03-23 RX ADMIN — METHOCARBAMOL TABLETS 500 MG: 500 TABLET, COATED ORAL at 04:26

## 2025-03-23 RX ADMIN — SPIRONOLACTONE 50 MG: 25 TABLET, FILM COATED ORAL at 09:10

## 2025-03-23 RX ADMIN — ACETAMINOPHEN 500 MG: 500 TABLET, FILM COATED ORAL at 09:13

## 2025-03-23 RX ADMIN — HYDROCODONE BITARTRATE AND ACETAMINOPHEN 1 TABLET: 5; 325 TABLET ORAL at 17:10

## 2025-03-23 RX ADMIN — Medication 3 ML: at 09:11

## 2025-03-23 RX ADMIN — HYDROCODONE BITARTRATE AND ACETAMINOPHEN 1 TABLET: 5; 325 TABLET ORAL at 04:26

## 2025-03-23 RX ADMIN — BISACODYL 10 MG: 10 SUPPOSITORY RECTAL at 09:10

## 2025-03-23 RX ADMIN — PREGABALIN 100 MG: 100 CAPSULE ORAL at 09:10

## 2025-03-23 RX ADMIN — ENOXAPARIN SODIUM 40 MG: 100 INJECTION SUBCUTANEOUS at 13:04

## 2025-03-23 RX ADMIN — METHOCARBAMOL TABLETS 500 MG: 500 TABLET, COATED ORAL at 13:04

## 2025-03-23 NOTE — CASE MANAGEMENT/SOCIAL WORK
Continued Stay Note  [facility]     Patient Name: Olu Izquierdo  MRN: [id number]  Today's Date: 3/23/2025    Admit Date: 3/17/2025    Plan: DC to SNF at [facility] via [facility]   Discharge Plan       Row Name 03/23/25 1448       Plan    Plan DC to SNF at [facility] via [facility]    Patient/Family in Agreement with Plan yes  spoke with son Petr    Plan Comments Inbound call from staff RN stating pt is going to DC today and needs transport to [facility]. Called and spoke with [facility] and run scheduled for 8PM. Called and updated pt's son Petr and he is in agreement for DC plan. Updated staff RN........JW                   Discharge Codes    No documentation.                 Expected Discharge Date and Time       Expected Discharge Date Expected Discharge Time    Mar 23, 2025               Divya Flor, RN

## 2025-03-23 NOTE — PLAN OF CARE
Goal Outcome Evaluation:  Plan of Care Reviewed With: patient        Progress: improving  Outcome Evaluation: POD#6 Of [place] with Fusion and Instrumentation. Dressing to back intact. VSS. PO pain medication helping with pain. Up with assistance. Voiding per purewick. Complain of Right big toe pain.  Redness and edema to right big toe. Podiatry consulted . Education provided on pain control and safety. Patient verbalized understanding..

## 2025-03-23 NOTE — DISCHARGE SUMMARY
Olu Izquierdo  1940    Patient Care Team:  Edenilson Henry MD as PCP - General (Family Medicine)    Date of Admit: 3/17/2025    Date of Discharge:  3/23/2025    Discharge Diagnosis:  Spinal stenosis of lumbar region with neurogenic claudication    Essential hypertension    Hypercholesterolemia      Procedures Performed  Procedure(s):  Lumbar 2 to lumbar 3 and lumbar 3 to lumbar 4 laminectomy with neurolysis with fusion and instrumentation       Complications: None    Consultants:   Consults       Date and Time Order Name Status Description    3/22/2025  5:17 PM Inpatient Podiatry Consult Completed     3/17/2025  2:09 PM Inpatient Hospitalist Consult              Condition on Discharge: stable    Discharge disposition: [facility]      Brief HPI: Patient evaluated in office for complaints of low back pain with radiation down the lateral aspect of his right hip into his right foot.. Imaging revealed severe stenosis at L2-3 and L3-4. RBAs of treatment were discussed including the above procedure. Patient consented to above procedure.    Hospital Course: Patient admitted for above procedure. The procedure itself was without complication. The patient was transferred to [facility] following recovery.  For the first several days after surgery patient was unable to tolerate PT due to dizziness and lightheadedness and was found to be orthostatic.  Blood pressure medicines were held and patient has a gradually been able to progress more with PT.  Patient will need to follow-up with primary care to determine when blood pressure medications can be restarted.  Patient was also evaluated by podiatry for pain in his right great toe.  Podiatry is treating for acute gout with colchicine 0.6 mg twice daily for 5 days.  Patient will need to follow-up with them as instructed.  On 3/20 he did have a increase in his white count to 14 he was started on vancomycin for 2 days and white count on day of discharge is down to 10.  He  had remained afebrile throughout hospital stay.  He did have a bowel movement on day of discharge.  He has been voiding without difficulty, tolerating diet.  Pain has been oral narcotics.  Patient will be discharged to the [facility] for rehab.  Pt is stable and ready for discharge today.    Discharge Physical Exam:    Temp:  [97.5 °F (36.4 °C)-98.2 °F (36.8 °C)] 98.2 °F (36.8 °C)  Heart Rate:  [78-93] 78  Resp:  [16-18] 16  BP: (133-173)/(58-76) 133/67    Current labs:  Lab Results (last 24 hours)       Procedure Component Value Units Date/Time    Basic Metabolic Panel [[id number]]  (Abnormal) Collected: 03/23/25 0634    Specimen: Blood Updated: 03/23/25 0729     Glucose 108 mg/dL      BUN 14 mg/dL      Creatinine 0.72 mg/dL      Sodium 135 mmol/L      Potassium 4.1 mmol/L      Chloride 100 mmol/L      CO2 27.0 mmol/L      Calcium 8.6 mg/dL      BUN/Creatinine Ratio 19.4     Anion Gap 8.0 mmol/L      eGFR 90.7 mL/min/1.73     Narrative:      GFR Categories in Chronic Kidney Disease (CKD)      GFR Category          GFR (mL/min/1.73)    Interpretation  G1                     90 or greater         Normal or high (1)  G2                      60-89                Mild decrease (1)  G3a                   45-59                Mild to moderate decrease  G3b                   30-44                Moderate to severe decrease  G4                    15-29                Severe decrease  G5                    14 or less           Kidney failure          (1)In the absence of evidence of kidney disease, neither GFR category G1 or G2 fulfill the criteria for CKD.    eGFR calculation 2021 CKD-EPI creatinine equation, which does not include race as a factor    CBC & Differential [[id number]]  (Abnormal) Collected: 03/23/25 0634    Specimen: Blood Updated: 03/23/25 0712    Narrative:      The following orders were created for panel order CBC & Differential.  Procedure                               Abnormality         Status                      ---------                               -----------         ------                     CBC Auto Differential[[id number]]        Abnormal            Final result                 Please view results for these tests on the individual orders.    Uric Acid [[id number]]  (Normal) Collected: 03/23/25 0634    Specimen: Blood Updated: 03/23/25 0729     Uric Acid 4.2 mg/dL     Vitamin B12 [[id number]]  (Normal) Collected: 03/23/25 0634    Specimen: Blood Updated: 03/23/25 0745     Vitamin B-12 573 pg/mL     Narrative:      Results may be falsely increased if patient taking Biotin.      Folate [[id number]]  (Normal) Collected: 03/23/25 0634    Specimen: Blood Updated: 03/23/25 0745     Folate >20.00 ng/mL     Narrative:      Results may be falsely increased if patient taking Biotin.      CBC Auto Differential [[id number]]  (Abnormal) Collected: 03/23/25 0634    Specimen: Blood Updated: 03/23/25 0712     WBC 10.54 10*3/mm3      RBC 3.06 10*6/mm3      Hemoglobin 9.6 g/dL      Hematocrit 28.7 %      MCV 93.8 fL      MCH 31.4 pg      MCHC 33.4 g/dL      RDW 11.7 %      RDW-SD 39.1 fl      MPV 8.8 fL      Platelets 285 10*3/mm3      Neutrophil % 64.2 %      Lymphocyte % 14.1 %      Monocyte % 14.3 %      Eosinophil % 4.7 %      Basophil % 0.7 %      Immature Grans % 2.0 %      Neutrophils, Absolute 6.76 10*3/mm3      Lymphocytes, Absolute 1.49 10*3/mm3      Monocytes, Absolute 1.51 10*3/mm3      Eosinophils, Absolute 0.50 10*3/mm3      Basophils, Absolute 0.07 10*3/mm3      Immature Grans, Absolute 0.21 10*3/mm3      nRBC 0.0 /100 WBC               General Appearance No acute distress   HEENT NC/AT;    Neurological Awake, Alert, and oriented x 3   Motor Strength normal, tone normal BLE's   Sensory Intact to light touch BLE's   Gait and station PT note 3/22-CGA for gait with RW for distance of 80'.    Back Midline lumbar surgical incision is well-appearing, well-approximated with Steri-Strips in place.  No surrounding  erythema, drainage or swelling.   Extremities Mild erythema and swelling to the right great toe with mild tenderness to palpation         Discharge Medications  BLAKE has been reviewed and narcotic consent is on file in the patient's chart.     Your medication list        PAUSE taking these medications        Instructions Last Dose Given Next Dose Due   hydrALAZINE 25 MG tablet  Wait to take this until your doctor or other care provider tells you to start again.  Commonly known as: APRESOLINE      TAKE 1 TABLET BY MOUTH TWICE DAILY       hydroCHLOROthiazide 12.5 MG tablet  Wait to take this until your doctor or other care provider tells you to start again.      TAKE 1 TABLET BY MOUTH DAILY              START taking these medications        Instructions Last Dose Given Next Dose Due   HYDROcodone-acetaminophen 5-325 MG per tablet  Commonly known as: NORCO  Replaces: HYDROcodone-acetaminophen 7.5-325 MG per tablet      Take 1 tablet by mouth Every 4 (Four) Hours As Needed for Moderate Pain for up to 4 days.       methocarbamol 500 MG tablet  Commonly known as: ROBAXIN      Take 1 tablet by mouth Every 8 (Eight) Hours.       ondansetron ODT 4 MG disintegrating tablet  Commonly known as: ZOFRAN-ODT      Take 1 tablet by mouth Every 6 (Six) Hours As Needed for Nausea or Vomiting.       polyethylene glycol 17 g packet  Commonly known as: MIRALAX      Take 17 g by mouth Daily As Needed (Use if senna-docusate is ineffective).       sennosides-docusate 8.6-50 MG per tablet  Commonly known as: PERICOLACE      Take 2 tablets by mouth 2 (Two) Times a Day As Needed for Constipation.              CHANGE how you take these medications        Instructions Last Dose Given Next Dose Due   atorvastatin 10 MG tablet  Commonly known as: LIPITOR  What changed: when to take this      TAKE 1 TABLET BY MOUTH DAILY       metoprolol succinate XL 25 MG 24 hr tablet  Commonly known as: TOPROL-XL  What changed: when to take this      TAKE 1  TABLET BY MOUTH DAILY              CONTINUE taking these medications        Instructions Last Dose Given Next Dose Due   acetaminophen 500 MG tablet  Commonly known as: TYLENOL      Take 1 tablet by mouth Daily. PT STATES HE TAKES 3 TABLETS IN THE AM       diphenhydrAMINE-acetaminophen  MG tablet per tablet  Commonly known as: TYLENOL PM      Take 2 tablets by mouth At Night As Needed for Sleep.       pregabalin 100 MG capsule  Commonly known as: LYRICA      TAKE 1 CAPSULE BY MOUTH TWICE DAILY       PREPARATION H EX      Apply 1 Application topically As Needed.       ramipril 10 MG capsule  Commonly known as: ALTACE      TAKE 1 CAPSULE BY MOUTH TWICE DAILY       spironolactone 50 MG tablet  Commonly known as: ALDACTONE      TAKE 1 TABLET BY MOUTH DAILY              STOP taking these medications      HYDROcodone-acetaminophen 7.5-325 MG per tablet  Commonly known as: NORCO  Replaced by: HYDROcodone-acetaminophen 5-325 MG per tablet                  Where to Get Your Medications        These medications were sent to [facility] [address] [facility] - [phone] [facility] [phone]   [address] [facility], [facility] [address]      Phone: [phone]   HYDROcodone-acetaminophen 5-325 MG per tablet  methocarbamol 500 MG tablet  ondansetron ODT 4 MG disintegrating tablet  polyethylene glycol 17 g packet  sennosides-docusate 8.6-50 MG per tablet       These medications were sent to [id number] - [place], [place] - [address] AT [facility] [address] - [phone]  - [phone]  [address], [facility] [address]      Phone: [phone]   hydrALAZINE 25 MG tablet         Discharge Diet:   Diet Instructions       Diet: Regular/House Diet; Thin (IDDSI 0)      Discharge Diet: Regular/House Diet    Fluid Consistency: Thin (IDDSI 0)          Diet Order   Procedures    Diet: Regular/House; Fluid Consistency: Thin (IDDSI 0)        Activity at Discharge:   Activity Instructions       Discharge Activity      1) No driving until cleared by us and no longer taking narcotics.   2) Off work/school until cleared by us.  3) May shower but no submerging wound in tub, pool, etc.  4) Do not lift / push / pull more then 5 lbs.  5.) Wear brace when out of bed  6.) No overhead activity/ No bending/twisting/impact activity    Other Restrictions (Specify)      Pelvic Rest              Call for: questions or concerns    Follow-up Appointments  Future Appointments   Date Time Provider Department Center   4/1/2025 10:15 AM Luis Dudley MD [facility]   4/4/2025  9:45 AM Edenilson Henry MD [facility]      Contact information for follow-up providers       Edenilson Henry MD .    Specialty: Family Medicine  Contact information:  [address]  [address]  [facility] [id number] [phone]                       Contact information for after-discharge care       Destination       [facility] .    Service: Skilled Nursing  Contact information:  [address] [facility] [address] [phone]                     Home Medical Care       [facility] .    Services: Home Nursing, Home Rehabilitation  Contact information:  [address]  [facility] [address] [phone]                                 Additional Instructions for the Follow-ups that You Need to Schedule       Discharge Follow-up with Specialty: Podiatry   As directed      Specialty: Podiatry        Notify Physician or Go To The ED For the Following Conditions   As directed      Including but not limited to fever >100.5, chills, wound concerns (redness, swelling, drainage), new symptoms of numbness, tingling, weakness; new or uncontrolled pain despite using prescribed medications    Order Comments: Including but not limited to fever >100.5, chills, wound concerns (redness, swelling, drainage), new symptoms of  "numbness, tingling, weakness; new or uncontrolled pain despite using prescribed medications                 Test Results Pending at Discharge     None    I discussed the discharge instructions with patient and nursing staff    Tena Graham, APRN  03/23/25  12:12 EDT      \"Dictated utilizing Dragon dictation\".      "

## 2025-03-23 NOTE — PROGRESS NOTES
Name: Olu Izquierdo ADMIT: 3/17/2025   : 1940  PCP: Edenilson Henry MD    MRN: [id number] LOS: 6 days   AGE/SEX: 84 y.o. male  ROOM: [facility]     Subjective   Subjective     He thinks that his toe is doing better today with less pain. I think it does look slightly less swollen and red and it is significantly less tender       Objective   Objective   Vital Signs  Temp:  [97.5 °F (36.4 °C)-98.2 °F (36.8 °C)] 98 °F (36.7 °C)  Heart Rate:  [78-93] 78  Resp:  [16-18] 16  BP: (135-173)/(58-76) 141/76  SpO2:  [96 %-98 %] 96 %  on   ;   Device (Oxygen Therapy): room air  Body mass index is 26.31 kg/m².  Physical Exam  Constitutional:       General: He is not in acute distress.     Appearance: He is not toxic-appearing.   Cardiovascular:      Rate and Rhythm: Normal rate and regular rhythm.      Heart sounds: Normal heart sounds.   Pulmonary:      Effort: Pulmonary effort is normal.      Breath sounds: Normal breath sounds.   Abdominal:      General: Bowel sounds are normal.      Palpations: Abdomen is soft.   Musculoskeletal:         General: Swelling (right MTP joint of the first toe with erythema, swelling, tenderness (tenderenss is improved today)) present.      Right lower leg: No edema.      Left lower leg: No edema.   Neurological:      Mental Status: He is alert.   Psychiatric:         Mood and Affect: Mood normal.         Behavior: Behavior normal.         Results Review     I reviewed the patient's new clinical results.  Results from last 7 days   Lab Units 25  0634 258 25  0529   WBC 10*3/mm3 10.54 11.07* 11.34* 14.66*   HEMOGLOBIN g/dL 9.6* 9.5* 9.4* 10.3*   PLATELETS 10*3/mm3 285 230 196 199     Results from last 7 days   Lab Units 25  0634 25  0418 25  0518 25  0529   SODIUM mmol/L 135* 136 137 134*   POTASSIUM mmol/L 4.1 4.3 3.8 4.0   CHLORIDE mmol/L 100 101 103 96*   CO2 mmol/L 27.0 28.0 27.1 26.3   BUN mg/dL 14 14 12 12   CREATININE  "mg/dL 0.72* 0.78 0.66* 0.73*   GLUCOSE mg/dL 108* 111* 118* 124*   Estimated Creatinine Clearance: 96.8 mL/min (A) (by C-G formula based on SCr of 0.72 mg/dL (L)).    Results from last 7 days   Lab Units 03/23/25  0634 03/22/25  0418 03/21/25  0518 03/20/25  0529   CALCIUM mg/dL 8.6 8.5* 8.1* 8.8     No results found for: \"COVID19\"  No results found for: \"HGBA1C\", \"POCGLU\"    XR Toe 2+ View Right  Narrative: XR TOE 2+ VW RIGHT-       HISTORY:   right first toe swelling and erythema; M48.062-Spinal  stenosis, lumbar region with neurogenic claudication      TECHNIQUE: 3 views of the right great toe.     FINDINGS:     Negative for acute fracture, dislocation, or radiopaque foreign body.     Impression:    No acute abnormality.        This report was finalized on 3/22/2025 7:23 PM by Dr. Dewey Moy M.D on Workstation: [id number]       Scheduled Medications  acetaminophen, 500 mg, Oral, Daily  atorvastatin, 10 mg, Oral, Nightly  colchicine, 0.6 mg, Oral, Q12H  enoxaparin sodium, 40 mg, Subcutaneous, Q24H  [Held by provider] hydrALAZINE, 25 mg, Oral, BID  [Held by provider] hydroCHLOROthiazide, 12.5 mg, Oral, Daily  melatonin, 10 mg, Oral, Nightly  methocarbamol, 500 mg, Oral, Q8H  metoprolol succinate XL, 25 mg, Oral, Nightly  pregabalin, 100 mg, Oral, BID  sodium chloride, 3 mL, Intravenous, Q12H  spironolactone, 50 mg, Oral, Daily    Infusions   Diet  Diet: Regular/House; Fluid Consistency: Thin (IDDSI 0)       Assessment/Plan     Active Hospital Problems    Diagnosis  POA    **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       84 y.o. male admitted with Spinal stenosis of lumbar region with neurogenic claudication.    Lumbar spinal stenosis with neurogenic claudication-s/p L2-3 and L3-4 decompression and fusion on 3/17/25.   Essential " hypertension-bp is better controlled today after restarting some home medications yesterday. If gout is confirmed in his toe, then I wouldn't restart his HCTZ.  Hyperlipidemia-statin  Right first toe swelling, erythema, tenderness around the first MTP joint-suspicious for gout. Seems somewhat improved after only a couple of doses of colchicine. Xray was negative. Uric acid level was normal. Podiatry is consulted for consideration of arthrocentesis since this is a first episode to establish the diagnosis.   Leukocytosis-wbc has normalized, though his eosinophilia is a bit worse today.  Anemia of chronic disease-hgb is stable at 9.6  Lovenox 40 mg SC daily for DVT prophylaxis.  Full code.  Discussed with patient.  Anticipate discharge to SNU facility timing yet to be determined      Odin Mcgraw MD  [facility]  03/23/25  09:07 EDT

## 2025-03-23 NOTE — DISCHARGE INSTRUCTIONS
[facility]    [address], [address]    [place] [address]    Phone: [phone]    Fax: [phone]    Luis Dudley M.D., F.A.C.S.        CARE AND INSTRUCTIONS AFTER LUMBAR FUSION    1. LSO brace when out of bed.  You can sit but get up an move around if you feel any discomfort. Avoid sitting in one place for longer than 30-45 minutes. Get up an walk around, or lie down to change position. You may lie on a firm couch, regular mattress or in a recliner. AVOID waterbed. You may lie on your side. Do not lie on your stomach. You may use one pillow under your head when laying down and you may use pillows under or in between your knees for comfort.    2. No driving. You can ride short distances in a car in the front passenger’s seat that reclines, or you may lie down in the back seat.    3. Don’t lift anything heavier than a coffee cup or paperback book.    4. Gradually increase your activity each day. You should be out of bed every hour during the day. Walk outside as soon as you feel up to doing so. Walk short distances frequently rather than making a long trip. Your goal is to be walking 1 to 3 miles per day when you return for your post-operative office visit. (NEVER DO THIS IN ONE TRIP)    5. You may climb stairs BUT take them slowly.    6. Keep your incision clean and dry. If you notice any redness, swelling or drainage call the office @ [phone]. There will be glue over your incision. This will peel off on its own and should not be pulled off.    7. You may shower five (5) days after surgery. Do not let the water hit directly on the incision, gently pat dry.    8. You should have a post-operative appointment scheduled for 2 to 2 ½ weeks after surgery with our Physician Assistant or Nurse Practitioner. If you do not, please call the office when you return home from the hospital to schedule this appointment.    9. Your prescription for pain medication may be refilled for only half  the original amount prior to your return office visit. In order to have this medication refilled you must contact the office four days prior to the due date.    10. Don’t be alarmed if you continue to experience some of your pre-operative symptoms after going home. This is not un-common and usually improves within a few days but could last longer. If you have any questions please call our office at [phone].

## 2025-03-23 NOTE — CONSULTS
"    Podiatry Consult Note      Patient: Olu Izquierdo Admit Date: 03/04/2025    Age: 84 y.o.   PCP: Edenilson Henry MD    MRN: [id number]  Room: [facility]/        Subjective     Chief Complaint   No chief complaint on file.       HPI     84-year-old male who was admitted to [facility] due to lumbar spinal stenosis and neurogenic claudication.  Neurosurgery admitted the patient and he has undergone surgery this admission.  He states his back seems to be doing well postoperatively.  Podiatry consulted for concerns of redness and swelling to the right big toe as well as associated pain.  Patient states he does not have a history of gout and uric acid has been tested this admission which was normal.  X-rays were taken which were negative for any abnormalities to the area.  He states he was given colchicine which was started yesterday and it does already feel better today.    Past Medical History     Past Medical History:   Diagnosis Date    Anesthesia complication     HAD DIFFICULTY URINATING AFTER SURGERY    Arthritis     Hyperlipidemia     Hypertension     Injury of back     Back surgery 1984 partial discectomy with \"sciatic nerve damage\"    Low back pain     Neuropathy     BILAT FEET    Numbness     left knee    Spinal stenosis     LUMBAR        Past Surgical History:   Procedure Laterality Date    CATARACT EXTRACTION, BILATERAL      EPIDURAL BLOCK      EYE SURGERY      LUMBAR DISCECTOMY      LUMBAR DISCECTOMY Right 11/08/2019    Procedure: Right Lumbar two-three laminectomy with metrix;  Surgeon: Luis Dudley MD;  Location: [facility];  Service: Neurosurgery    LUMBAR DISCECTOMY FUSION INSTRUMENTATION N/A 3/17/2025    Procedure: Lumbar 2 to lumbar 3 and lumbar 3 to lumbar 4 laminectomy with neurolysis with fusion and instrumentation;  Surgeon: Luis Dudley MD;  Location: [facility];  Service: Neurosurgery;  Laterality: N/A;        Allergies   Allergen Reactions    Dilaudid " [Hydromorphone Hcl] Hallucinations        Social History     Tobacco Use   Smoking Status Never   Smokeless Tobacco Never        Objective   Physical Exam    Vitals:    03/23/25 0725   BP: 141/76   Pulse: 78   Resp: 16   Temp: 98 °F (36.7 °C)   SpO2: 96%        Vascular: DP and PT pulses palpable bilateral, capillary refill normal to all digits.  Dermatology: Skin texture and turgor normal.  Xerosis bilateral feet  Musculoskeletal: MS and ROM within normal limits bilateral lower extremities.  Mild edema and erythema with tenderness to palpation to the medial aspect of the first metatarsal phalangeal joint.  No open lesions  Neurologic: Light touch sensation intact bilaterally.      Labs     Lab Results   Component Value Date    HGBA1C 5.60 03/11/2025    POCGLU 141 (H) 03/18/2025    SEDRATE 6 03/11/2025        CBC:      Lab 03/23/25  0634 03/22/25  0418 03/21/25  0518 03/20/25  0529 03/19/25  0346   WBC 10.54 11.07* 11.34* 14.66* 10.81*   HEMOGLOBIN 9.6* 9.5* 9.4* 10.3* 10.2*   HEMATOCRIT 28.7* 29.2* 28.7* 30.2* 29.7*   PLATELETS 285 230 196 199 164   NEUTROS ABS 6.76 7.21* 7.44* 10.61* 7.48*   IMMATURE GRANS (ABS) 0.21* 0.19* 0.15* 0.14* 0.06*   LYMPHS ABS 1.49 1.68 1.65 1.55 1.33   MONOS ABS 1.51* 1.53* 1.74* 2.19* 1.79*   EOS ABS 0.50* 0.41* 0.31 0.13 0.12   MCV 93.8 94.8 96.3 93.2 93.4          No results found for this or any previous visit.     XR Toe 2+ View Right  Narrative: XR TOE 2+ VW RIGHT-       HISTORY:   right first toe swelling and erythema; M48.062-Spinal  stenosis, lumbar region with neurogenic claudication      TECHNIQUE: 3 views of the right great toe.     FINDINGS:     Negative for acute fracture, dislocation, or radiopaque foreign body.     Impression:    No acute abnormality.        This report was finalized on 3/22/2025 7:23 PM by Dr. Dewey Moy M.D on Workstation: [id number]          Assessment/Plan     84-year-old male right foot acute gout:  - I believe this is acute gout flare  although uric acid is normal.  - Continue colchicine 0.6 mg tablets twice daily for 5 days.  - Weightbearing as tolerated bilateral lower extremities from my perspective  - No concern for infection to the area.  - Okay for discharge from my perspective.  Discussed directions for outpatient follow-up upon discharge.  Please call my office for appointment.  - Signing off at this time, thank you for the consult      Сергей Bowers DPM  Office: [phone]

## 2025-03-24 NOTE — CASE MANAGEMENT/SOCIAL WORK
Case Management Discharge Note      Final Note: [facility]         Selected Continued Care - Discharged on 3/23/2025 Admission date: 3/17/2025 - Discharge disposition: Rehab Facility or Unit (DC - External)      Destination Coordination complete.      Service Provider Services Address Phone Fax Patient Preferred    [facility] [address] [address] [phone] --              Durable Medical Equipment    No services have been selected for the patient.                Dialysis/Infusion    No services have been selected for the patient.                Home Medical Care Coordination complete.      Service Provider Services Address Phone Fax Patient Preferred    [facility] [address] [place] [address] [phone] --              Therapy    No services have been selected for the patient.                Community Resources    No services have been selected for the patient.                Community & DME    No services have been selected for the patient.                    Transportation Services  Ambulance: [facility]    Final Discharge Disposition Code: 03 - skilled nursing facility (SNF)

## 2025-03-25 ENCOUNTER — TELEPHONE (OUTPATIENT)
Dept: NEUROSURGERY | Facility: CLINIC | Age: 85
End: 2025-03-25

## 2025-03-25 NOTE — TELEPHONE ENCOUNTER
A nurse from [facility]  Michele .called on behalf of PT. PT wants to push back Post-Op kelli on 4-1-25, w/Dr Dudley. If this is not advisable PT will keep 4-1-25 kelli. Plea advise [phone].      Thank you

## 2025-04-09 NOTE — PROGRESS NOTES
Subjective   Patient ID: Olu Izquierdo is a 84 y.o. male is here today for 2 week PO follow-up. Patient had a Lumbar 2 to lumbar 3 and lumbar 3 to lumbar 4 laminectomy with neurolysis with fusion and instrumentation  3/17/2025    Today patient states that he has mild pain in his low back that radiates to the L leg, along with N/T. Patient incision looks health, no redness, no drainage, no swelling     History of Present Illness    This patient returns today.  He is doing very well.  His severe pain is gone.  His incision looks good.  He does have a stitch at the bottom.  He is still having a fair amount of pain from the surgery but the radiating pain is gone.    The following portions of the patient's history were reviewed and updated as appropriate: allergies, current medications, past family history, past medical history, past social history, past surgical history, and problem list.      Objective     There were no vitals filed for this visit.  There is no height or weight on file to calculate BMI.    Tobacco Use: Low Risk  (4/10/2025)    Patient History     Smoking Tobacco Use: Never     Smokeless Tobacco Use: Never     Passive Exposure: Not on file          Physical Exam    Neurological:      Mental Status: He is alert and oriented to person, place, and time.       Neurological Exam    Mental Status  Alert. Oriented to person, place, and time.            Assessment & Plan   Independent Review of Radiographic Studies:      I personally reviewed the images from the following studies.    There is no new imaging to review    Medical Decision Making:      I told the patient that we will refill his narcotic medications to take 2 a day for a week and then 1 a day for a week and then stop.  We can refill his Lyrica and his Robaxin.  Will see him again in a week to take the stitch out at the bottom of his incision.  We will check x-rays when he comes in the next time so we can go ahead and start outpatient physical  therapy.    Diagnoses and all orders for this visit:    1. Follow-up examination following surgery (Primary)  -     pregabalin (LYRICA) 100 MG capsule; Take 1 capsule by mouth 2 (Two) Times a Day.  Dispense: 60 capsule; Refill: 0  -     HYDROcodone-acetaminophen (NORCO) 5-325 MG per tablet; Okay to take 1 every 12 hours for a week and then 1 a day for a week then stop.  Dispense: 21 tablet; Refill: 0  -     XR Spine Lumbar 2 or 3 View; Future    Other orders  -     methocarbamol (ROBAXIN) 500 MG tablet; Take 1 tablet by mouth Every 8 (Eight) Hours.  Dispense: 40 tablet; Refill: 0      Return in about 1 week (around 4/17/2025).

## 2025-04-10 ENCOUNTER — OFFICE VISIT (OUTPATIENT)
Dept: NEUROSURGERY | Facility: CLINIC | Age: 85
End: 2025-04-10
Payer: MEDICARE

## 2025-04-10 DIAGNOSIS — Z09 FOLLOW-UP EXAMINATION FOLLOWING SURGERY: Primary | ICD-10-CM

## 2025-04-10 PROCEDURE — 99024 POSTOP FOLLOW-UP VISIT: CPT | Performed by: NEUROLOGICAL SURGERY

## 2025-04-10 RX ORDER — HYDROCODONE BITARTRATE AND ACETAMINOPHEN 5; 325 MG/1; MG/1
TABLET ORAL
Qty: 21 TABLET | Refills: 0 | Status: SHIPPED | OUTPATIENT
Start: 2025-04-10

## 2025-04-10 RX ORDER — METHOCARBAMOL 500 MG/1
500 TABLET, FILM COATED ORAL EVERY 8 HOURS SCHEDULED
Qty: 40 TABLET | Refills: 0 | Status: SHIPPED | OUTPATIENT
Start: 2025-04-10

## 2025-04-10 RX ORDER — PREGABALIN 100 MG/1
100 CAPSULE ORAL 2 TIMES DAILY
Qty: 60 CAPSULE | Refills: 0 | Status: SHIPPED | OUTPATIENT
Start: 2025-04-10

## 2025-04-11 ENCOUNTER — TELEPHONE (OUTPATIENT)
Dept: NEUROSURGERY | Facility: CLINIC | Age: 85
End: 2025-04-11
Payer: MEDICARE

## 2025-04-11 ENCOUNTER — OFFICE VISIT (OUTPATIENT)
Dept: FAMILY MEDICINE CLINIC | Facility: CLINIC | Age: 85
End: 2025-04-11
Payer: MEDICARE

## 2025-04-11 VITALS
WEIGHT: 187 LBS | HEART RATE: 82 BPM | BODY MASS INDEX: 25.33 KG/M2 | OXYGEN SATURATION: 98 % | SYSTOLIC BLOOD PRESSURE: 117 MMHG | TEMPERATURE: 96.9 F | HEIGHT: 72 IN | DIASTOLIC BLOOD PRESSURE: 61 MMHG

## 2025-04-11 DIAGNOSIS — M48.062 SPINAL STENOSIS OF LUMBAR REGION WITH NEUROGENIC CLAUDICATION: Primary | ICD-10-CM

## 2025-04-11 DIAGNOSIS — Z09 HOSPITAL DISCHARGE FOLLOW-UP: Primary | ICD-10-CM

## 2025-04-11 DIAGNOSIS — I10 ESSENTIAL HYPERTENSION: ICD-10-CM

## 2025-04-11 DIAGNOSIS — M48.061 SPINAL STENOSIS OF LUMBAR REGION, UNSPECIFIED WHETHER NEUROGENIC CLAUDICATION PRESENT: ICD-10-CM

## 2025-04-11 NOTE — PROGRESS NOTES
Transitional Care Follow Up Visit  Subjective     Olu Izquierdo is a 84 y.o. male who presents for a transitional care management visit.    Within 48 business hours after discharge our office contacted him via telephone to coordinate his care and needs.      I reviewed and discussed the details of that call along with the discharge summary, hospital problems, inpatient lab results, inpatient diagnostic studies, and consultation reports with Olu.     Current outpatient and discharge medications have been reconciled for the patient.  Reviewed by: Edenilson Henry MD           No data to display              Risk for Readmission (LACE) No data recorded    History of Present Illness   Course During Hospital Stay: Follow-up for spinal surgery for severe spinal stenosis lower lumbar spine.  Procedure described by the surgeon as without complication.  There is difficulty with postoperative physical therapy because of dizziness and lightheadedness.  Found to be orthostatic.  His blood pressure medication was held.  They did recommend follow-up with me for blood pressure medication regulation.  He was also found to have gout in the right toe.  Was started on colchicine for 5 days.  He did have a jump in his white count to 14,000.  He is placed on vancomycin for couple days and then it was discontinued.  He remained afebrile throughout the hospital stay.  He was discharged to a rehab facility.    Blood pressure today is normal.  He continues atorvastatin, metoprolol, and ramipril/Altace, hydralazine and spironolactone.  At the [facility] the Holding his hydrochlorothiazide and hydralazine because of low blood pressure.    He states he feels better now.  The radiculopathy down his leg is now gone.  The gout symptoms in his right great toe are much improved.  Note his creatinine was normal and his uric acid level was 4.2, normal.  No fever.  He otherwise feels well.  I reviewed the blood work from the [facility]  "recently.  Potassium 4.5.  Creatinine normal.     The following portions of the patient's history were reviewed and updated as appropriate: allergies, current medications, past family history, past medical history, past social history, past surgical history, and problem list.    Review of Systems    Objective   /61   Pulse 82   Temp 96.9 °F (36.1 °C) (Temporal)   Ht 182.9 cm (72\")   Wt 84.8 kg (187 lb)   SpO2 98%   BMI 25.36 kg/m²   Physical Exam  Vitals and nursing note reviewed.   Constitutional:       General: He is not in acute distress.     Appearance: He is well-developed.   Cardiovascular:      Rate and Rhythm: Normal rate and regular rhythm.      Heart sounds: Normal heart sounds.   Pulmonary:      Effort: Pulmonary effort is normal.      Breath sounds: Normal breath sounds.   Musculoskeletal:      Comments: Right great toe.  Some mild synovitis of the first metatarsophalangeal joint.   Skin:     General: Skin is warm and dry.   Psychiatric:         Mood and Affect: Mood normal.         Assessment & Plan   Diagnoses and all orders for this visit:    1. Hospital discharge follow-up (Primary)    2. Spinal stenosis of lumbar region, unspecified whether neurogenic claudication present    3. Essential hypertension      Hospital discharge follow-up for spinal stenosis with neurologic claudication.  Doing much better from radicular pain standpoint.  He is happy with the surgery he states.  His blood pressure was labile at [facility] and low at the hospital.  At the [facility] they kept having to hold his hydralazine and hydrochlorothiazide.  He is going to discontinue the hydrochlorothiazide and continue all other medication.  He is going to check his blood pressure twice a day for the next 3 weeks and I will see him back.  If any concerns he is going to contact us.  The goal is to get off of the hydralazine.  And perhaps lower his dose of ramipril.  No lab work needed immediately but I may check " lab work again in 1 month.  His medication list was reconciled.  He is doing well overall.

## 2025-04-11 NOTE — TELEPHONE ENCOUNTER
Carli from [facility] called and the patient has several doctor's appointments so he will not be able to do his eval until Monday 04/14/25. Carli is requesting new orders to be sent for the eval and stated that they can also be verbal orders, thank you!

## 2025-04-14 ENCOUNTER — TELEPHONE (OUTPATIENT)
Dept: NEUROSURGERY | Facility: CLINIC | Age: 85
End: 2025-04-14

## 2025-04-14 NOTE — TELEPHONE ENCOUNTER
STEFFANIE @  OCCUPATIONAL THERAPY CALLED STATES THAT THE PATIENT IS NOT NEEDING OCCUPATIONAL THERAPY AT THIS TIME    cb - [phone]    THANK YOU

## 2025-04-14 NOTE — TELEPHONE ENCOUNTER
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   Mahad Vyas is initially referred by Severino Beaulieu M.D, est 7/2022     Subjective      Interval hx/concerns: last seen 11/2024, had interval CACS and labs.  No new sx, tolerating all meds.    CACS = 766 (80th%ile), 10yr risk 19.3%  New ascending ao dilation = 4.2cm noted on CACS, no prior echo   All labs normal   apoB 53 down from 81    ASCENDING AO DILATION / ANEURYSM:  Denies chest, back, abdominal pain, SOB, dysphagia, worsening cough, hemoptysis.  Pertinent pmhx:  Initial visit hx/sx: noted incidentally on CACS = 4.2cm in 2025 .    HTN: yes, stable on meds   Hx of tobacco:   reports that he has never smoked. He has never used smokeless tobacco.  Hx of connective tissue d/o (eg. Marfans, Cely-Danlos, Loeys-David): no  Hx of inflammatory / autoimmune vasculitis (Takayasu's arteritis, Giant Cell arteritis): no   Hx of infective aortitis, HIV, syphilis: no   Hx of MVA, chest wall trauma, deceleration injuries: no   Hx of Biscuspid Aortic Valve:  no prior echo   Hx of anabolic steroid use or legal or illicit stimulants: no  Pertinent famhx:  Connective tissue disorders: no   Aneurysmal disease or known hereditary thoracic aneurysmal disease: no    HTN:  Home BP log: occ checking 120s/70s.  Tolerating meds, good adherence.    Lifestyle factors:  Weight Change: stable   Diet pattern: oatmeal, eggs 6/wk, berries.  High pasta and soup intake. Limited red meat   Daily salt intake estimate:  Low     EtOH: Yes, Details: social, usu <2/day   Exercise habits:  10k steps/day most days,  gym 4x/week    Perceived barriers to care: none     HLD:  stable, Current treatment: lifestyle changes  and Moderate intensity - good tolerance   Reports not taking zetia as prior rx'd.    BASELINE LIPID:   Latest Reference Range & Units 01/26/22 08:45   Cholesterol,Tot 100 - 199 mg/dL 238  Rx Refill Note  Requested Prescriptions     Pending Prescriptions Disp Refills   • atorvastatin (LIPITOR) 10 MG tablet 90 tablet 1     Sig: Take 1 tablet by mouth Daily.      Last office visit with prescribing clinician: 7/13/2022   Last telemedicine visit with prescribing clinician: 2/27/2023   Next office visit with prescribing clinician: 2/27/2023                         Would you like a call back once the refill request has been completed: [] Yes [] No    If the office needs to give you a call back, can they leave a voicemail: [] Yes [] No    Polly Vyas  02/22/23, 09:41 EST   (H)   Triglycerides 0 - 149 mg/dL 199 (H)   HDL >=40 mg/dL 52   LDL <100 mg/dL 146 (H)     Antithrombotic therapy: No     Current Outpatient Medications on File Prior to Visit   Medication Sig Dispense Refill    tadalafil (CIALIS) 5 MG tablet TAKE 1 TABLET BY MOUTH EVERY DAY (NOT COVERED)      ciprofloxacin (CIPRO) 500 MG Tab TAKE 1 TABLET BY MOUTH EVERY 12 HOURS FOR 14 DAYS      ezetimibe (ZETIA) 10 MG Tab Take 1 Tablet by mouth every day. To lower cholesterol and heart disease risk 100 Tablet 3    rosuvastatin (CRESTOR) 20 MG Tab Take 1 Tablet by mouth every 48 hours. 45 Tablet 3    Telmisartan-amLODIPine 80-10 MG Tab       Fexofenadine-Pseudoephedrine (ALLEGRA-D 24 HOUR PO) Take 1 Tablet by mouth 1 time a day as needed.      omeprazole (PRILOSEC) 20 MG delayed-release capsule       vitamin D (VITAMIND D3) 1000 UNIT Tab Take 1,000 Units by mouth every day.      azelastine (ASTELIN) 137 MCG/SPRAY nasal spray Administer 1 Spray into affected nostril(S) 2 times a day. 30 mL 0    levothyroxine (SYNTHROID) 50 MCG Tab Take 1 Tab by mouth Every morning on an empty stomach. 90 Tab 3    fluticasone (FLONASE) 50 MCG/ACT nasal spray Spray 1 Spray in nose every day.      diphenoxylate-atropine (LOMOTIL) 2.5-0.025 MG Tab Take 1 Tab by mouth 4 times a day as needed for Diarrhea.       No current facility-administered medications on file prior to visit.      Allergies   Allergen Reactions    Aspirin      GI specific         Social History     Tobacco Use    Smoking status: Never    Smokeless tobacco: Never   Vaping Use    Vaping status: Never Used   Substance Use Topics    Alcohol use: Yes     Alcohol/week: 4.2 - 6.0 oz     Types: 7 - 10 Cans of beer per week    Drug use: Yes     Types: Marijuana     Comment: Edibles      Review of Systems   Constitutional:  Negative for chills and fever.   Respiratory:  Negative for cough.    Cardiovascular:  Negative for chest pain, palpitations, orthopnea, claudication and leg swelling.  "  Gastrointestinal:  Negative for nausea.   Musculoskeletal:  Negative for myalgias.   Neurological:  Negative for weakness.   Endo/Heme/Allergies:  Does not bruise/bleed easily.       Objective    Vitals:    04/14/25 1017 04/14/25 1020   BP: 132/76 129/75   BP Location: Left arm Left arm   Patient Position: Sitting Sitting   BP Cuff Size: Large adult Large adult   Pulse: (!) 54 (!) 52   Weight: 81.6 kg (180 lb)    Height: 1.753 m (5' 9\")        Body mass index is 26.58 kg/m².  BP Readings from Last 5 Encounters:   04/14/25 129/75   11/25/24 (!) 141/90   03/11/24 129/75   09/07/23 120/72   03/09/23 (!) 142/73      Physical Exam  Vitals reviewed.   Constitutional:       General: He is not in acute distress.     Appearance: He is well-developed. He is not diaphoretic.   HENT:      Head: Normocephalic and atraumatic.   Neck:      Thyroid: No thyromegaly.      Vascular: No JVD.   Cardiovascular:      Rate and Rhythm: Normal rate and regular rhythm.      Pulses: Normal pulses.      Heart sounds: No murmur heard.  Pulmonary:      Effort: No respiratory distress.      Breath sounds: Normal breath sounds. No wheezing or rales.   Musculoskeletal:         General: No swelling or tenderness.   Neurological:      General: No focal deficit present.      Mental Status: He is oriented to person, place, and time.      Cranial Nerves: No cranial nerve deficit.      Coordination: Coordination normal.   Psychiatric:         Mood and Affect: Mood normal.         Behavior: Behavior normal.        Accessory Clinical Findings:     Lab Results   Component Value Date    CHOLSTRLTOT 109 03/27/2025    LDL 41 03/27/2025    HDL 49 03/27/2025    TRIGLYCERIDE 94 03/27/2025      Lab Results   Component Value Date/Time    LDL 41 03/27/2025 07:44 AM     (H) 09/05/2023 08:56 AM     (H) 03/03/2023 07:17 AM    LDL 73 09/02/2022 10:19 AM     (H) 01/26/2022 08:45 AM     Lab Results   Component Value Date/Time    LIPOPROTA 21 " "09/02/2022 10:19 AM      Lab Results   Component Value Date/Time    APOB 53 (L) 03/27/2025 07:44 AM    APOB 81 03/03/2023 07:17 AM    APOB 80 09/02/2022 10:19 AM      No results found for: \"CRPHIGHSEN\"    Lab Results   Component Value Date    HBA1C 5.3 03/27/2025      Lab Results   Component Value Date    SODIUM 141 03/27/2025    POTASSIUM 4.5 03/27/2025    CHLORIDE 106 03/27/2025    CO2 26 03/27/2025    GLUCOSE 85 03/27/2025    BUN 14 03/27/2025    CREATININE 0.96 03/27/2025    BUNCREATRAT 16 09/05/2024          Lab Results   Component Value Date    URCREAT 137.00 03/27/2025    MICROALBUR <1.2 03/27/2025    MALBCRT see below 03/27/2025       Lab Results   Component Value Date/Time    MALBCRT see below 03/27/2025 07:44 AM    MICROALBUR <1.2 03/27/2025 07:44 AM    MICRALB <3.0 09/05/2024 04:40 AM        IMAGING:    LLE venous 11/2019   Status post ablation of the LEFT greater saphenous vein with no flow    demonstrated.   No evidence of deep venous thrombosis.     No prior duplex is available for comparison.    CAC 4/2022   Coronary calcification:  LMA - 0.0  LCX - 129.5  LAD - 164.7  RCA - 16.5  PDA - 0.0   Total Calcium Score: 310.7   Percentile: Calcium score is above the 50th percentile for the patient's age and sex.   Other findings:  Heart: Normal size.  Lungs: Clear.  Mediastinum: Normal.  Upper abdomen: Small hiatal hernia.    CT abd/pelvis 4/2022   Adrenal glands: Subtle nodularity of the adrenal glands without a discrete nodule identified.   Vasculature: Nonaneurysmal aorta. Mild scattered arterial calcifications.  1.  No acute intra-abdominal findings   2.  Diverticulosis.   3.  Atherosclerosis    3/2025 CACS  Coronary calcification:  LMA - 0.0  LCX - 126.4  LAD - 617.0  RCA - 23.4   Total Calcium Score: 766.8   Percentile: Calcium score is above the 50th percentile for the patient's age and sex.   Other findings:  Heart: Normal size  Lungs: Clear  Mediastinum: 4.2 cm ascending thoracic aortic " aneurysm  Upper abdomen: Normal            MEDICAL DECISION-MAKING:  TODAY'S ASSESSMENT / STATUS / PLAN:  1. Ascending aorta dilation (HCC)  EC-ECHOCARDIOGRAM COMPLETE W/O CONT      2. Aortic atherosclerosis (HCC)  EC-ECHOCARDIOGRAM COMPLETE W/O CONT      3. Coronary artery calcification seen on CT scan        4. Agatston CAC score, >400        5. Mixed hyperlipidemia  APOLIPOPROTEIN B    Comp Metabolic Panel    Lipid Profile      6. Primary hypertension  Referral to establish with PCP      7. Prediabetes        8. Long term (current) use of antithrombotics/antiplatelets          BLOOD PRESSURE CONTROL   Qualifies as Resistant HTN (RH):  No, BP at goal with <3 meds   Office BP Goal per ACC/AHA  <130/80  Home BP at goal:  yes  Office BP at goal:  yes  24h ABPM:  not ordered to date   Contributing factors: stress   Plan:   Monitoring:   - start/continue home BP monitoring, reviewed correct technique:  Yes   - order 24h ABPM: NO  - monitor lytes/gfr routinely      ADHERENCE:   Assessment: Complete  Recommendations: Instructed to Continue Taking All Medications As Prescribed    LIFESTYLE INTERVENTIONS  TOBACCO:    reports that he has never smoked. He has never used smokeless tobacco.   - continued complete avoidance of all tobacco products     PHYSICAL ACTIVITY: >150min/week of mod-intensity activity or as much as tolerated    NUTRITION: DASH  and reduce Na to 1500mg/day     ETOH: limit to 2 or less standard drinks/day     WT MGMT: maintain healthy weight     STANDARD HTN MEDS:  - continue telmisartan-amlodipine 80/10mg daily  - stopped HCTZ due to potential cause of ED - monitor response of BP and ED     ADDITIONAL AGENTS:   -none at this time          WORK UP FOR SECONDARY CAUSES OF RH:  Obstructive Sleep Apnea: Not Yet Assessed  Renal parenchymal disease: Excluded  Renovascular HTN: Not Yet Assessed  Primary Aldosteronism: Not Yet Assessed  Thyroid Function: stable TSH on levothyroxine   Pheochromocytoma: Not Yet  Assessed   Cushing's:   Not Yet Assessed   Coarctation of Aorta: excluded  Other:   # stress - SNS driven BP elevations     HTN-MEDIATED END-ORGAN DAMAGE:  Left Ventricular Hypertrophy: Not Assessed on   one   Date: n/a   Urine Albuminuria: None on Date: 2022  Renal Function: Chronic Kidney Disease  CKD G2 at worst   Ophthalmologic: Absent per patient report and/or eye specialist     Established CVD:     # Asymptomatic, subclinical CAD (evidenced by CACS = 766 (80%ile for age/gender) in 2025)  - increase from 300s (70th%ile) in 2022  - no prior dx ASCVD events  - no LMA plaque noted   - no indications for functional testing w/o symptoms   - as reviewed with pt, ACC/AHA guidelines for chronic CAD mgmt (2023) support GDMT alone as initial mgmt citing multiple RCTs (ISCHEMIA, COURAGE, PRESTON 2D) demonstrating early revasc strategy plus GDMT did not improve MACE outcomes compared to GDMT alone  Plan:  - continue lifestyle and med mgmt as noted below   - consider functional testing if new symptoms over time      # Non-aneurysmal aortic atherosclerosis (AS) - CT, no symptoms or prior known associated clinical manifestations  - general indicator of systemic AS with higher potential AS in other vascular beds  - Higher overall ASCVD risk  Plan:  - no further imaging surveillance, continue med mgmt      # ASCENDING AORTIC DILATION/ANEURYSM WITH ATHEROSCLEROSIS - stable, asymptomatic  - presumed multifactorial etiology including age-related medial degeneration  - in general, atherosclerosis can be contributor but not primary etiology   - no prior Echo   - no known hereditary thoracic aortic disease (HTAD)  or non-syndromic (nsHTAD) in pmhx or fhx  - as reviewed with pt, surg repair indicated if 5.5+cm or rapid expansion rate due to increased risk for rupture/dissections  - rapid expansion for sporadic or nsHTAD TAAs (>0.5+ cm/yr or >0.3 cm/yr in 2 consecutive years)   - at initial visit - reviewed anatomy, risks, emergency s/s  requiring immediate attention, and thresholds for surgical intervention and gave handout   3/2025 CACS (non-contrast)  = 4.2cm - small, INITIAL sizing   Plan:   Lifestyle mgmt:  - continue healthy lifestyle choices as noted below and avoid all tobacco products   - activity restrictions to reduce risk of aneurysmal expansion include avoidance of the following:  long duration high intensity cardio, extreme endurance activities, high-intensity strength training >20-30lbs, and avoid straining or holding breath when lifting  Med mgmt:   - focus BB and ARB as 1st line HTN agents  - treatment of dyslipidemia, dysglycemia   - avoid all prescription or illicit stimulants   Screening/surveillance:  - recommend 1st degree relatives get screened with echo for TAA (per 2022 ACC guideline)  - consider genetic aortopathy evaluation if 1+ first degree relatives with dilation/aneurysm  - recommended for lifelong surveillance  - initial echo in 6momonths (9/2025) from prior  - if stable initial surveillance imaging then repeat annual echo surveillance, and consider CTA q3-5yr or if indications of rapid expansion noted    LIPID MANAGEMENT:   Qualifies for Statin Therapy per ACC/AHA Guidelines: yes, Secondary Prevention - Very high risk ASCVD - 2 major events or 1 event with other high-risk conditions  Major ASCVD events: CAD and/or CAC >300      High-risk conditions: >64yr old  and Hypertension   Risk-enhancers: Persistently elevated trigs >174   Currently on Statin: partial intolerant to daily rosuvastatin  Tx goals: LDL-C <70 , apoB <70  due CACS >100  At goal? Yes, 4/2025   Plan:   - reinforced ongoing lifestyle mgmt   - monitor labs   Meds:   - continue rosuva 20mg 3d/week - MAX TOLERATED   - continue zetia 10mg   - consider nexletol if <20% to LDL-C goal   - consider PCSK9i strategy if >20% to LDL-C goal     GLYCEMIA MANAGEMENT:  Prediabetic  Lab Results   Component Value Date/Time    HBA1C 5.3 03/27/2025 07:44 AM    HBA1C 5.8  (H) 03/06/2024 07:07 AM    HBA1C 5.4 02/03/2021 06:10 AM       Lab Results   Component Value Date/Time    MALBCRT see below 03/27/2025 07:44 AM    MICROALBUR <1.2 03/27/2025 07:44 AM    MICRALB <3.0 09/05/2024 04:40 AM    Plan:  - continue healthy diet, weight reduction, daily physical activity   - consider metformin up to 850mg BID to slow or offset progression to T2D as per DPP trial   - monitor labs Q6-12mo    ANTITHROMBOTIC THERAPY:    - consider plavix in future   - has prior stopped ASA per his request - worried about GI effects   CACS >100 favors aspirin therapy as per evidence from [employer] [facility] (2020) indicating net benefit in primary prevention patients who are at low risk for bleeding.      OTHER: none     STUDIES:  9/2025 Echo - ordered, Rn to track   LABS: as noted above  FOLLOW-UP: 5 months     Oh Muir M.D.  [facility]   [facility]   [phone]

## 2025-04-16 NOTE — PROGRESS NOTES
Subjective   Patient ID: Olu Izquierdo is a 84 y.o. male is here today for 1 week PO follow-up with a new XR Lumbar done today. Patient had a Lumbar 2 to lumbar 3 and lumbar 3 to lumbar 4 laminectomy with neurolysis with fusion and instrumentation done on 3/17/2025    History of Present Illness    This patient returns today.  He is doing well.  He has no pain in his legs at all.  He has an occasional twinge in his foot but it comes and goes.  His incision looks great.    The following portions of the patient's history were reviewed and updated as appropriate: allergies, current medications, past family history, past medical history, past social history, past surgical history, and problem list.      Objective     There were no vitals filed for this visit.  There is no height or weight on file to calculate BMI.    Tobacco Use: Low Risk  (4/17/2025)    Patient History     Smoking Tobacco Use: Never     Smokeless Tobacco Use: Never     Passive Exposure: Not on file          Physical Exam    Neurological:      Mental Status: He is alert and oriented to person, place, and time.       Neurological Exam    Mental Status  Alert. Oriented to person, place, and time.            Assessment & Plan   Independent Review of Radiographic Studies:      I personally reviewed the images from the following studies.    I reviewed x-rays done today.  They have not yet been reviewed by radiologist.  I compared them to x-rays done a month ago.  Both look about the same.  There is good alignment of the construct at L2-3 and L3-4.  Both were done for postoperative purposes.    Medical Decision Making:      I told the patient he can come out of his brace.  We will start him on physical therapy  I will see him back in  about 6 weeks with an x-ray.    Diagnoses and all orders for this visit:    1. Follow-up examination following surgery (Primary)  -     Ambulatory Referral to Physical Therapy for Evaluation & Treatment  -     XR Spine Lumbar 2  or 3 View; Future      Return in about 6 weeks (around 5/29/2025).

## 2025-04-17 ENCOUNTER — OFFICE VISIT (OUTPATIENT)
Dept: NEUROSURGERY | Facility: CLINIC | Age: 85
End: 2025-04-17
Payer: MEDICARE

## 2025-04-17 DIAGNOSIS — Z09 FOLLOW-UP EXAMINATION FOLLOWING SURGERY: Primary | ICD-10-CM

## 2025-04-17 PROCEDURE — 99024 POSTOP FOLLOW-UP VISIT: CPT | Performed by: NEUROLOGICAL SURGERY

## 2025-04-18 RX ORDER — HYDROCHLOROTHIAZIDE 12.5 MG/1
12.5 TABLET ORAL DAILY
Qty: 90 TABLET | Refills: 0 | Status: SHIPPED | OUTPATIENT
Start: 2025-04-18

## 2025-04-18 RX ORDER — RAMIPRIL 10 MG/1
10 CAPSULE ORAL EVERY 12 HOURS SCHEDULED
Qty: 180 CAPSULE | Refills: 1 | Status: SHIPPED | OUTPATIENT
Start: 2025-04-18

## 2025-04-18 NOTE — TELEPHONE ENCOUNTER
Rx Refill Note  Requested Prescriptions     Pending Prescriptions Disp Refills    hydroCHLOROthiazide 12.5 MG tablet [Pharmacy Med Name: HYDROCHLOROTHIAZIDE 12.5MG TABLETS] 90 tablet 1     Sig: TAKE 1 TABLET BY MOUTH DAILY    ramipril (ALTACE) 10 MG capsule [Pharmacy Med Name: RAMIPRIL 10MG CAPSULES] 180 capsule 1     Sig: TAKE 1 CAPSULE BY MOUTH TWICE DAILY      Last office visit with prescribing clinician: 4/11/2025   Last telemedicine visit with prescribing clinician: Visit date not found   Next office visit with prescribing clinician: 5/2/2025                         Would you like a call back once the refill request has been completed: [] Yes [] No    If the office needs to give you a call back, can they leave a voicemail: [] Yes [] No    Polly Vyas  04/18/25, 10:30 EDT

## 2025-04-21 ENCOUNTER — TELEPHONE (OUTPATIENT)
Dept: FAMILY MEDICINE CLINIC | Facility: CLINIC | Age: 85
End: 2025-04-21
Payer: MEDICARE

## 2025-04-21 NOTE — TELEPHONE ENCOUNTER
Caller: [facility]    Relationship:     Best call back number: [phone]     What is the best time to reach you: ANY    Who are you requesting to speak with (clinical staff, provider,  specific staff member):     Do you know the name of the person who called: THAO    What was the call regarding: PATIENT WAS IN HOSPITAL RECENTLY AND HAD A UTI.  PATIENT IS PRETTY SURE THAT HE HAS ANOTHER ONE.  [facility] WANTED TO KNOW IF YOU CAN CALL SOMETHING IN OR IF YOU CAN ORDER A URINALYSIS AND THAO COULD COLLECT THAT FOR YOU.     PLEASE CALL TO ADVISE.

## 2025-04-22 ENCOUNTER — PATIENT ROUNDING (BHMG ONLY) (OUTPATIENT)
Dept: URGENT CARE | Facility: CLINIC | Age: 85
End: 2025-04-22
Payer: MEDICARE

## 2025-04-22 NOTE — ED NOTES
Thank you for letting us care for you during your recent visit at [facility]. We would love to hear about your experience with us.     We’re always looking for ways to make our patients’ experiences even better. Do you have any recommendations on ways we may improve?    I appreciate you taking the time to respond. Please be on the lookout for a survey about your recent visit from [facility] via text or email. We would greatly appreciate if you could fill that out and turn it back in. We want your voice to be heard and we value your feedback.     Thank you for choosing [facility] for your healthcare needs.

## 2025-04-23 ENCOUNTER — TREATMENT (OUTPATIENT)
Dept: PHYSICAL THERAPY | Facility: CLINIC | Age: 85
End: 2025-04-23
Payer: MEDICARE

## 2025-04-23 DIAGNOSIS — M54.50 LUMBAR PAIN: Primary | ICD-10-CM

## 2025-04-23 NOTE — PROGRESS NOTES
Physical Therapy Initial Evaluation and Plan of Care  [address] [facility], [address]  [place] [address]    Patient: Olu Izquierdo   : 1940  Diagnosis/ICD-10 Code:  Lumbar pain [M54.50]  Referring practitioner: Luis Dudley MD  Date of Initial Visit: 2025  Today's Date: 2025  Patient seen for 1 session         Visit Diagnoses:    ICD-10-CM ICD-9-CM   1. Lumbar pain  M54.50 724.2         Subjective Questionnaire: Oswestry: 23      Subjective Evaluation    History of Present Illness  Mechanism of injury: Patient is an 84 year old male who presents following lumbar surgery on 3/17/25.  Patient had a L2 to L3 and L3 to L4 laminectomy with neurolysis with fusion.  Reports that the surgery has helped,  Reports that the back bothered him since 2024.  Reports being in the hospital for one week, then rehab for 2 weeks before going home.    Reports pain in the right ankle with driving and walking at times.    Reports difficulty balancing since the surgery.  Denies any falls.  Reports minimal limitations with activities around the house.  Reports having to care for his wife.  Reports sleeping well.    History of 2 previous back surgeries in  (disc removal) and  (laminectomy).  No other surgeries.    Patient goal: to lift up to 25 lbs in order to break down his wife's scooter when they go out      Patient Occupation: Retired Pain  Current pain ratin  At worst pain ratin  Location: left mid back to low back  Quality: dull ache  Relieving factors: ice  Exacerbated by: nothing specific.             Objective          Postural Observations    Additional Postural Observation Details  Patient ambulates with an antalgic gait pattern with the use of cane on his right side.  Trunk is shifted to the right.    Palpation     Additional Palpation Details  Minimal TTP to the left QL.    Active Range of Motion     Lumbar   Flexion: 70 degrees   Extension: 5 degrees   Left lateral  flexion: 9 degrees   Right lateral flexion: 8 degrees     Strength/Myotome Testing     Left Hip   Planes of Motion   Abduction: 4  Adduction: 4+  External rotation: 5  Internal rotation: 4+    Right Hip   Planes of Motion   Abduction: 4  Adduction: 4+  External rotation: 5  Internal rotation: 4    Left Knee   Extension: 4    Right Knee   Extension: 4+    Left Ankle/Foot   Dorsiflexion: 5    Right Ankle/Foot   Dorsiflexion: 5          Assessment & Plan       Assessment  Impairments: abnormal gait, abnormal or restricted ROM, activity intolerance, impaired physical strength, lacks appropriate home exercise program and pain with function   Assessment details: Patient is an 84 year old male who presents with c/o pain, TTP, limited lumbar AROM, decreased LE strength and an antalgic gait pattern which is limiting his ability to perform activities.  Barriers to therapy: none  Prognosis: good  Prognosis details: STG's to be met by 4 weeks  1)  Independent with HEP to show compliance  2)  Decrease pain by 50% or more to allow patient to perform self care and activities more comfortably  3)  AROM lumbar flexion to 90 for improved ability to bend forward to pick object ups  4)  Min to no TTP present to indicate improved healing response  5)  AROM lumbar SB to 15-20 for improved trunk mobility during gait    LTG's to be met by 8 weeks  1)  Independent with HEP progression to show continued compliance  2)  Decrease pain by 75% or more to allow patient to return to activities and hobbies with minimal limitations   3)  Increase strength for the LE to 4+/5 for improved stability with gait and stairs  4)  No TTP present to indicate improved healing response  5)  Patient to ambulate with a minimal to no antalgic gait pattern  6)  Improve Oswestry score by 10 or more for improved perceived quality of life        Plan  Therapy options: will be seen for skilled therapy services  Planned therapy interventions: strengthening, stretching,  therapeutic activities, home exercise program, gait training and neuromuscular re-education  Frequency: 2x week  Duration in weeks: 8  Treatment plan discussed with: patient            Timed:         Manual Therapy:    0     mins  72250;     Therapeutic Exercise:    18     mins  75875;    Neuromuscular Tremayne:    0    mins  98832;    Therapeutic Activity:     0     mins  13158;     Gait Trainin     mins  76820;     Ultrasound:     0     mins  74498;          Un-Timed:  Electrical Stimulation:    0     mins  45439 ( );    Low Eval     18     Mins  64407  Mod Eval     0     Mins  02082  High Eval                       0     Mins  19268        Timed Treatment:   18   mins   Total Treatment:     36   mins          PT: Chidi Arias PT     [place] License [id number]  Electronically signed by Chidi Arias PT, 25, 10:40 AM EDT    Certification Period: 2025 thru 2025  I certify that the therapy services are furnished while this patient is under my care.  The services outlined above are required by this patient, and will be reviewed every 90 days.    Luis Dudley Md  [address] [place] [address]   NPI: [id number]      Chidi Arias PT   License number: [id number]        Physician Signature:__________________________________________________    PHYSICIAN: Luis Dudley MD      DATE:     Please sign and return via fax to [email or web address] . Thank you, [facility].

## 2025-04-29 ENCOUNTER — TREATMENT (OUTPATIENT)
Dept: PHYSICAL THERAPY | Facility: CLINIC | Age: 85
End: 2025-04-29
Payer: MEDICARE

## 2025-04-29 DIAGNOSIS — M54.50 LUMBAR PAIN: Primary | ICD-10-CM

## 2025-04-29 PROCEDURE — 97110 THERAPEUTIC EXERCISES: CPT | Performed by: PHYSICAL THERAPIST

## 2025-04-29 PROCEDURE — 97535 SELF CARE MNGMENT TRAINING: CPT | Performed by: PHYSICAL THERAPIST

## 2025-04-29 NOTE — PROGRESS NOTES
Physical Therapy Daily Treatment Note  [address] [facility] [address] [address], [address]  [place] [address]      Patient: Olu Izquierdo   : 1940  Referring practitioner: Luis Dudley MD  Date of Initial Visit: Type: THERAPY  Noted: 2025  Today's Date: 2025  Patient seen for 2 sessions       Visit Diagnoses:    ICD-10-CM ICD-9-CM   1. Lumbar pain  M54.50 724.2           Subjective   Patient reports pain rated at 3-4/10.    Objective   See Exercise, Manual, and Modality Logs for complete treatment.   Added LAQ, PPT and glut sets.    Assessment/Plan  Subjective reports are slightly increased from the last visit (2/10).  Added NuStep for strength and endurance.  Added LAQ for quad strength, PPT and glut sets for abdominal.hip strength which will improve lumbar stability.  Patient performed the exercise routine without an increase in lumbar pain.  Plan to continue to gradually progress the strengthening exercises as the patient can tolerate.      Timed:         Manual Therapy:    0     mins  40098;     Therapeutic Exercise:    23     mins  83468;    Neuromuscular Tremayne:    0    mins  10918;    Therapeutic Activity:     0     mins  97103;     Gait Training      0    mins  71796;  Work Conditioning     0   mins  73302   Self Care  __8___ mins 99272      Untimed:  Electrical Stimulation:    0     mins  28069 ( );      Timed Treatment:   31   mins   Total Treatment:     31   mins    Chidi Arias, PT  [place] License: [id number]

## 2025-04-30 ENCOUNTER — TREATMENT (OUTPATIENT)
Dept: PHYSICAL THERAPY | Facility: CLINIC | Age: 85
End: 2025-04-30
Payer: MEDICARE

## 2025-04-30 DIAGNOSIS — M54.50 LUMBAR PAIN: Primary | ICD-10-CM

## 2025-04-30 NOTE — PROGRESS NOTES
Physical Therapy Daily Treatment Note  [address] [facility], [address]  [place] [address]      Patient: Olu Izquierdo   : 1940  Referring practitioner: Luis Dudley MD  Date of Initial Visit: Type: THERAPY  Noted: 2025  Today's Date: 2025  Patient seen for 3 sessions       Visit Diagnoses:    ICD-10-CM ICD-9-CM   1. Lumbar pain  M54.50 724.2           Subjective   Patient reports soreness in the low back and pain rated at 3/10.    Objective   See Exercise, Manual, and Modality Logs for complete treatment.   Add hip abd and clams.    Assessment/Plan  Subjective reports are slightly improved from his last visit (3-4/10).  Added hip abd and clams for hip/glut strengthening which will improve lumbar stability with lifting and functional activities.  Patient tolerated the increase in the strengthening exercises very well, no reports of pain with the routine.      Timed:         Manual Therapy:    0     mins  52918;     Therapeutic Exercise:    24     mins  80451;    Neuromuscular Tremayne:    0    mins  92222;    Therapeutic Activity:     0     mins  41972;     Gait Training      0    mins  44294;  Work Conditioning     0   mins  76126   Self Care  __10___ mins 65615      Untimed:  Electrical Stimulation:    0     mins  65976 ( );      Timed Treatment:   34   mins   Total Treatment:     34   mins    Chidi Arias, PT  [place] License: [id number]

## 2025-05-02 ENCOUNTER — OFFICE VISIT (OUTPATIENT)
Dept: FAMILY MEDICINE CLINIC | Facility: CLINIC | Age: 85
End: 2025-05-02
Payer: MEDICARE

## 2025-05-02 VITALS
BODY MASS INDEX: 25.36 KG/M2 | DIASTOLIC BLOOD PRESSURE: 62 MMHG | OXYGEN SATURATION: 98 % | RESPIRATION RATE: 17 BRPM | HEART RATE: 72 BPM | HEIGHT: 72 IN | TEMPERATURE: 97.5 F | SYSTOLIC BLOOD PRESSURE: 120 MMHG

## 2025-05-02 DIAGNOSIS — F32.9 REACTIVE DEPRESSION: ICD-10-CM

## 2025-05-02 DIAGNOSIS — Z09 FOLLOW-UP EXAMINATION FOLLOWING SURGERY: ICD-10-CM

## 2025-05-02 DIAGNOSIS — G89.4 CHRONIC PAIN SYNDROME: Primary | ICD-10-CM

## 2025-05-02 DIAGNOSIS — I10 ESSENTIAL HYPERTENSION: Chronic | ICD-10-CM

## 2025-05-02 PROBLEM — M48.062 SPINAL STENOSIS OF LUMBAR REGION WITH NEUROGENIC CLAUDICATION: Chronic | Status: ACTIVE | Noted: 2020-01-13

## 2025-05-02 RX ORDER — PREGABALIN 100 MG/1
100 CAPSULE ORAL 2 TIMES DAILY
Qty: 180 CAPSULE | Refills: 1 | Status: SHIPPED | OUTPATIENT
Start: 2025-05-02

## 2025-05-02 RX ORDER — DULOXETIN HYDROCHLORIDE 20 MG/1
20 CAPSULE, DELAYED RELEASE ORAL DAILY
Qty: 90 CAPSULE | Refills: 1 | Status: SHIPPED | OUTPATIENT
Start: 2025-05-02

## 2025-05-02 RX ORDER — HYDROCODONE BITARTRATE AND ACETAMINOPHEN 5; 325 MG/1; MG/1
1 TABLET ORAL EVERY 12 HOURS PRN
Qty: 12 TABLET | Refills: 0 | Status: SHIPPED | OUTPATIENT
Start: 2025-05-02

## 2025-05-02 RX ORDER — FERROUS SULFATE 325(65) MG
1 TABLET ORAL DAILY
COMMUNITY
Start: 2025-04-09 | End: 2025-05-02

## 2025-05-02 NOTE — PROGRESS NOTES
"Chief Complaint  Hypertension    Subjective        Olu Izquierdo presents to [facility]  History of Present Illness    Follow-up.  We discontinue the hydralazine last visit.  He had some labile blood pressure, but now his blood pressure at home is normalized and is steady.  He continues hydrochlorothiazide at low-dose, metoprolol, spironolactone, and Altace.    Chronic back pain.  Since his spinal stenosis surgery his lower back pain is now much improved and the neurogenic claudication is improved.  He also has history of neuropathy.  He continues on Lyrica originally prescribed by neurosurgery.  He is through his short-term hydrocodone prescription.  He is asking to extend the hydrocodone for a few days.  He is feeling sad and depressed at times.  The pain is problematic for him.  But he is doing the physical therapy and walking.  And is hopeful things will improve.        Objective   Vital Signs:  /62   Pulse 72   Temp 97.5 °F (36.4 °C) (Oral)   Resp 17   Ht 182.9 cm (72\")   SpO2 98%   BMI 25.36 kg/m²   Estimated body mass index is 25.36 kg/m² as calculated from the following:    Height as of this encounter: 182.9 cm (72\").    Weight as of 4/11/25: 84.8 kg (187 lb).            Physical Exam  Vitals and nursing note reviewed.   Constitutional:       General: He is not in acute distress.     Appearance: He is well-developed.   Cardiovascular:      Rate and Rhythm: Normal rate and regular rhythm.      Heart sounds: Normal heart sounds.   Pulmonary:      Effort: Pulmonary effort is normal.   Skin:     General: Skin is warm and dry.   Psychiatric:      Comments: Affect flat.  Mood mildly depressed.        Result Review :  The following data was reviewed by: Edenilson Henry MD on 05/02/2025:  Common labs          3/21/2025    05:18 3/22/2025    04:18 3/23/2025    06:34   Common Labs   Glucose 118  111  108    BUN 12  14  14    Creatinine 0.66  0.78  0.72    Sodium 137  136  " 135    Potassium 3.8  4.3  4.1    Chloride 103  101  100    Calcium 8.1  8.5  8.6    WBC 11.34  11.07  10.54    Hemoglobin 9.4  9.5  9.6    Hematocrit 28.7  29.2  28.7    Platelets 196  230  285    Uric Acid   4.2      TSH          3/11/2025    10:01   TSH   TSH 1.880                Assessment and Plan   Diagnoses and all orders for this visit:    1. Chronic pain syndrome (Primary)    2. Follow-up examination following surgery  -     HYDROcodone-acetaminophen (NORCO) 5-325 MG per tablet; Take 1 tablet by mouth Every 12 (Twelve) Hours As Needed (post operative back pain).  Dispense: 12 tablet; Refill: 0  -     pregabalin (LYRICA) 100 MG capsule; Take 1 capsule by mouth 2 (Two) Times a Day.  Dispense: 180 capsule; Refill: 1    3. Essential hypertension    4. Reactive depression    Other orders  -     DULoxetine (CYMBALTA) 20 MG capsule; Take 1 capsule by mouth Daily.  Dispense: 90 capsule; Refill: 1    Chronic pain syndrome.  With some reactive depression.  And history of neuropathy.  I am taking over the prescription of Lyrica.  Continue 100 mg twice a day.  Prescribers agreement being filled out today.  I am also extending his hydrocodone for very limited period of time.  12 tablets with no refills.  In addition I am adding duloxetine 20 mg a day.  Rationale for use, efficacy, and side effects of duloxetine discussed in detail.  I will see him back in 6 weeks for recheck.  The medium term plan is to continue Lyrica, discontinue hydrocodone, and see how he does on the duloxetine.  May go to a higher dose of duloxetine next visit.  He will continue his physical therapy.    Hypertension.  Better control.  He is now off hydralazine.  I think the hydralazine was added to some of the lability of the hypertension.  Due to rebound effects.  He will continue to monitor his blood pressure at home.         Follow Up   No follow-ups on file.  Patient was given instructions and counseling regarding his condition or for health  maintenance advice. Please see specific information pulled into the AVS if appropriate.

## 2025-05-05 ENCOUNTER — TREATMENT (OUTPATIENT)
Dept: PHYSICAL THERAPY | Facility: CLINIC | Age: 85
End: 2025-05-05
Payer: MEDICARE

## 2025-05-05 DIAGNOSIS — M54.50 LUMBAR PAIN: Primary | ICD-10-CM

## 2025-05-05 PROCEDURE — 97535 SELF CARE MNGMENT TRAINING: CPT | Performed by: PHYSICAL THERAPIST

## 2025-05-05 PROCEDURE — 97110 THERAPEUTIC EXERCISES: CPT | Performed by: PHYSICAL THERAPIST

## 2025-05-05 NOTE — PROGRESS NOTES
Physical Therapy Daily Treatment Note  [address] [facility], [address]  [place] [address]      Patient: Olu Izquierdo   : 1940  Referring practitioner: Luis Dudley MD  Date of Initial Visit: Type: THERAPY  Noted: 2025  Today's Date: 2025  Patient seen for 4 sessions       Visit Diagnoses:    ICD-10-CM ICD-9-CM   1. Lumbar pain  M54.50 724.2           Subjective   Patient reports soreness in the pain and some pain (3-4/10).    Objective   See Exercise, Manual, and Modality Logs for complete treatment.       Assessment/Plan  Subjective reports are slightly increased from the last visit (3/10), but this could be attributed to the weather.  Added squats, standing march, standing hip abd, standing hip extension and shlder ext with the band for hip/knee/abdominal strengthening which will improve lumbar stability with functional tasks.  Patient reported some pain int he left mid thoracic spine with the standing exercises, but no significant lumbar pain.      Timed:         Manual Therapy:    0     mins  69366;     Therapeutic Exercise:    19     mins  37968;     Neuromuscular Tremayne:    0    mins  01200;    Therapeutic Activity:     0     mins  94535;     Gait Training      0    mins  52786;  Work Conditioning     0   mins  36985   Self Care  ___8___ mins 07261      Untimed:  Electrical Stimulation:    0     mins  88797 ( );      Timed Treatment:   27   mins   Total Treatment:     30   mins    Chidi Arias, PT  [place] License: [id number]

## 2025-05-07 ENCOUNTER — TREATMENT (OUTPATIENT)
Dept: PHYSICAL THERAPY | Facility: CLINIC | Age: 85
End: 2025-05-07
Payer: MEDICARE

## 2025-05-07 DIAGNOSIS — M54.50 LUMBAR PAIN: Primary | ICD-10-CM

## 2025-05-07 NOTE — PROGRESS NOTES
Physical Therapy Daily Treatment Note  [address] [facility], [address]  [place] [address]      Patient: Olu Izquierdo   : 1940  Referring practitioner: Luis Dudley MD  Date of Initial Visit: Type: THERAPY  Noted: 2025  Today's Date: 2025  Patient seen for 5 sessions       Visit Diagnoses:    ICD-10-CM ICD-9-CM   1. Lumbar pain  M54.50 724.2         Subjective   Patient reports pain rated at 3-4/10.    Objective   See Exercise, Manual, and Modality Logs for complete treatment.   Added hinge.    Assessment/Plan  Subjective reports remain about the same.  Added hinge for posterior chain strengthening which will improve lumbar stability with lifting and functional tasks.  Patient had increased right quad pain with standing hip extension, therefore this side was held today.  Patient had no reports of pain otherwise, but was fatigued with the routine.      Timed:         Manual Therapy:    0     mins  95692;     Therapeutic Exercise:    15     mins  60786;     Neuromuscular Tremayne:    0    mins  07734;    Therapeutic Activity:     0     mins  89994;     Gait Training      0    mins  55601;  Work Conditioning     0   mins  20859   Self Care  __8___ mins 56901      Untimed:  Electrical Stimulation:    0     mins  48631 ( );      Timed Treatment:   23   mins   Total Treatment:     30   mins    Chidi Arias, PT  [place] License: [id number]

## 2025-05-12 ENCOUNTER — TREATMENT (OUTPATIENT)
Dept: PHYSICAL THERAPY | Facility: CLINIC | Age: 85
End: 2025-05-12
Payer: MEDICARE

## 2025-05-12 DIAGNOSIS — M54.50 LUMBAR PAIN: Primary | ICD-10-CM

## 2025-05-12 PROCEDURE — 97535 SELF CARE MNGMENT TRAINING: CPT | Performed by: PHYSICAL THERAPIST

## 2025-05-12 PROCEDURE — 97110 THERAPEUTIC EXERCISES: CPT | Performed by: PHYSICAL THERAPIST

## 2025-05-12 NOTE — PROGRESS NOTES
Physical Therapy Daily Treatment Note  [address] [facility], [address]  [place] [address]      Patient: Olu Izquierdo   : 1940  Referring practitioner: Luis Dudley MD  Date of Initial Visit: Type: THERAPY  Noted: 2025  Today's Date: 2025  Patient seen for 6 sessions       Visit Diagnoses:    ICD-10-CM ICD-9-CM   1. Lumbar pain  M54.50 724.2           Subjective   Patient reports pain in the back rated at 5/10.  Feels like it did before surgery and states that it started feeling like that yesterday.  Patient does report that the legs feel stronger.    Objective   See Exercise, Manual, and Modality Logs for complete treatment.       Assessment/Plan  Subjective reports are slightly increased from the previous visit (3-4/10), but this could be attributed to the weather change.  The exercise routine was not progressed from the last visit due to the increase in subjective reports.  Patient reported a decreased in pain after performing the seated trunk flexion stretch.  Patient had no c/o with the routine.      Timed:         Manual Therapy:    0     mins  13137;     Therapeutic Exercise:    21     mins  65562;    Neuromuscular Tremayne:    0    mins  17222;    Therapeutic Activity:     0     mins  00644;     Gait Training      0    mins  50817;  Work Conditioning     0   mins  23521   Self Care  __8___ mins 18899      Untimed:  Electrical Stimulation:    0     mins  79825 ( );      Timed Treatment:   29   mins   Total Treatment:     29   mins    Chidi Arias, PT  [place] License: [id number]

## 2025-05-14 ENCOUNTER — TREATMENT (OUTPATIENT)
Dept: PHYSICAL THERAPY | Facility: CLINIC | Age: 85
End: 2025-05-14
Payer: MEDICARE

## 2025-05-14 DIAGNOSIS — M54.50 LUMBAR PAIN: Primary | ICD-10-CM

## 2025-05-14 NOTE — PROGRESS NOTES
Physical Therapy Daily Treatment Note  [address] [facility], [address]  [place] [address]      Patient: Olu Izquierdo   : 1940  Referring practitioner: Luis Dudley MD  Date of Initial Visit: Type: THERAPY  Noted: 2025  Today's Date: 2025  Patient seen for 7 sessions       Visit Diagnoses:    ICD-10-CM ICD-9-CM   1. Lumbar pain  M54.50 724.2           Subjective   Patient reports that the back feels better today, rates the pain at 2-3/10.    Objective   See Exercise, Manual, and Modality Logs for complete treatment.       Assessment/Plan  Subjective reports are improved from the last visit (5/10), which leads me to believe that the increase last visit was due to the weather.  Patient performed the exercises with less fatigue than previously, indicating improved strength and endurance.  Added seated fig 4 stretch for improved flexibility and a reduction in pain in the right glut region.      Timed:         Manual Therapy:    0     mins  74524;     Therapeutic Exercise:    19     mins  24219;     Neuromuscular Tremayne:    0    mins  48623;    Therapeutic Activity:     0     mins  97617;     Gait Training      0    mins  58634;  Work Conditioning     0   mins  15909   Self Care  __8___ mins 28945    Untimed:  Electrical Stimulation:    0     mins  51799 ( );      Timed Treatment:   27   mins   Total Treatment:     27   mins    Chidi Arias, PT  [place] License: [id number]

## 2025-05-19 ENCOUNTER — TREATMENT (OUTPATIENT)
Dept: PHYSICAL THERAPY | Facility: CLINIC | Age: 85
End: 2025-05-19
Payer: MEDICARE

## 2025-05-19 DIAGNOSIS — M54.50 LUMBAR PAIN: Primary | ICD-10-CM

## 2025-05-19 PROCEDURE — 97110 THERAPEUTIC EXERCISES: CPT | Performed by: PHYSICAL THERAPIST

## 2025-05-19 PROCEDURE — 97535 SELF CARE MNGMENT TRAINING: CPT | Performed by: PHYSICAL THERAPIST

## 2025-05-19 NOTE — PROGRESS NOTES
Physical Therapy Daily Treatment Note  [address] [facility], [address]  [place] [address]      Patient: Olu Izquierdo   : 1940  Referring practitioner: Luis Dudley MD  Date of Initial Visit: Type: THERAPY  Noted: 2025  Today's Date: 2025  Patient seen for 8 sessions       Visit Diagnoses:    ICD-10-CM ICD-9-CM   1. Lumbar pain  M54.50 724.2             Subjective   Patient reports pain int he right hip region rated at 2/10.    Objective   See Exercise, Manual, and Modality Logs for complete treatment.   Added palloff press.    Assessment/Plan  Subjective reports of pain remain low.  Added palloff press for abdominal/oblique strengthening which will improve lumbar stability.  Patient performed the routine without an increase in lumbar pain.  Patient continues to be need core and LE strengthening.      Timed:         Manual Therapy:    0     mins  25035;     Therapeutic Exercise:    15     mins  52955;     Neuromuscular Tremayne:    0    mins  83511;    Therapeutic Activity:     0     mins  47130;     Gait Training      0    mins  37269;  Work Conditioning     0   mins  41320   Self Care  __8___ mins 92965      Untimed:  Electrical Stimulation:    0     mins  68348 ( );      Timed Treatment:   23   mins   Total Treatment:     28   mins    Chidi Arias, PT  [place] License: [id number]

## 2025-05-21 ENCOUNTER — TREATMENT (OUTPATIENT)
Dept: PHYSICAL THERAPY | Facility: CLINIC | Age: 85
End: 2025-05-21
Payer: MEDICARE

## 2025-05-21 DIAGNOSIS — M54.50 LUMBAR PAIN: Primary | ICD-10-CM

## 2025-05-21 NOTE — PROGRESS NOTES
Physical Therapy Daily Treatment Note  [address] [facility], [address]  [place] [address]      Patient: Olu Izquierdo   : 1940  Referring practitioner: Luis Dudley MD  Date of Initial Visit: Type: THERAPY  Noted: 2025  Today's Date: 2025  Patient seen for 9 sessions       Visit Diagnoses:    ICD-10-CM ICD-9-CM   1. Lumbar pain  M54.50 724.2          Subjective   Patient reports soreness, but no significant pain.  Reports feeling stronger.    Objective   See Exercise, Manual, and Modality Logs for complete treatment.       Assessment/Plan  Subjective reports are improving with regard to strength.  Pain continues to be intermittent.  Patient did well with the progression of strengthening exercises, no reports of pain with the routine.  Patient will follow up with the MD next week and continue as directed.      Timed:         Manual Therapy:    0     mins  68857;     Therapeutic Exercise:    15     mins  11170;     Neuromuscular Tremayne:    0    mins  45145;    Therapeutic Activity:     0     mins  52164;     Gait Training      0    mins  95949;  Work Conditioning     0   mins  75938   Self Care  __8___ mins 79430      Untimed:  Electrical Stimulation:    0     mins  30400 ( );      Timed Treatment:   23   mins   Total Treatment:     33   mins    Chidi Arias, PT  [place] License: [id number]

## 2025-05-28 NOTE — PROGRESS NOTES
Subjective   Patient ID: Olu Izquierdo is a 84 y.o. male is here today for 6 week PO follow-up with a XR Lumbar done today. Patient had a Lumbar 2 to lumbar 3 and lumbar 3 to lumbar 4 laminectomy with neurolysis with fusion and instrumentation done on 3/17/2025    Today patient states that he has R lower leg pain that radiates to the R ankle     History of Present Illness    This patient is doing fairly well.  He has a flareup of some pain in his right lower leg laterally in the calf and down to his ankle but not into his foot.  This occurred about 2 weeks ago.  Otherwise he has no pain.    The following portions of the patient's history were reviewed and updated as appropriate: allergies, current medications, past family history, past medical history, past social history, past surgical history, and problem list.      Objective     There were no vitals filed for this visit.  There is no height or weight on file to calculate BMI.    Tobacco Use: Low Risk  (5/29/2025)    Patient History     Smoking Tobacco Use: Never     Smokeless Tobacco Use: Never     Passive Exposure: Not on file          Physical Exam    Neurological:      Mental Status: He is alert and oriented to person, place, and time.       Neurological Exam    Mental Status  Alert. Oriented to person, place, and time.            Assessment & Plan   Independent Review of Radiographic Studies:      I personally reviewed the images from the following studies.    I reviewed his x-rays done today.  They have not been reviewed by radiologist.  This shows good alignment of the construct at L3-4 and L2-3.  I compared them to films done in April of this year and they look about the same.    Medical Decision Making:      I told the patient that I think this is just nerve inflammation with nerve healing.  I suggested that we start him on a Medrol Dosepak.  I will check him again in 6 weeks with another x-ray.  The x-ray today looks okay and so I think it is fine for  him to start increasing his activities and physical therapy and do more bending and lifting.    Diagnoses and all orders for this visit:    1. Follow-up examination following surgery (Primary)  -     XR Spine Lumbar Complete With Flex & Ext; Future    Other orders  -     methylPREDNISolone (MEDROL) 4 MG dose pack; Take as directed on package instructions.  Dispense: 21 tablet; Refill: 0      Return in about 6 weeks (around 7/10/2025).          FLT3 ITD (-) AML   Dacogen held on day 3 6/21 due to acute change in mental status now back to baseline. Course will go to day 6 to complete 5 days   Monitor daily CBC's and transfuse as needed, Monitor daily CMP and replete electrolytes as needed   Strict I's/O's, daily weights, mouth care, anti emetics.   s/p Dacogen 20 mg/m2 x 5 days + Venetoclax. (s/p Venetoclax escalation, now on 100 mg oral daily - while on posaconazole)  Day 21 BM bx due on 7/9  Pt is DNR

## 2025-05-29 ENCOUNTER — HOSPITAL ENCOUNTER (OUTPATIENT)
Dept: GENERAL RADIOLOGY | Facility: HOSPITAL | Age: 85
Discharge: HOME | End: 2025-05-29
Admitting: NEUROLOGICAL SURGERY
Payer: MEDICARE

## 2025-05-29 ENCOUNTER — OFFICE VISIT (OUTPATIENT)
Dept: NEUROSURGERY | Facility: CLINIC | Age: 85
End: 2025-05-29
Payer: MEDICARE

## 2025-05-29 ENCOUNTER — TELEPHONE (OUTPATIENT)
Dept: FAMILY MEDICINE CLINIC | Facility: CLINIC | Age: 85
End: 2025-05-29
Payer: MEDICARE

## 2025-05-29 DIAGNOSIS — Z09 FOLLOW-UP EXAMINATION FOLLOWING SURGERY: Primary | ICD-10-CM

## 2025-05-29 DIAGNOSIS — Z09 FOLLOW-UP EXAMINATION FOLLOWING SURGERY: ICD-10-CM

## 2025-05-29 PROCEDURE — 99024 POSTOP FOLLOW-UP VISIT: CPT | Performed by: NEUROLOGICAL SURGERY

## 2025-05-29 PROCEDURE — 72100 X-RAY EXAM L-S SPINE 2/3 VWS: CPT

## 2025-05-29 RX ORDER — METHYLPREDNISOLONE 4 MG/1
TABLET ORAL
Qty: 21 TABLET | Refills: 0 | Status: SHIPPED | OUTPATIENT
Start: 2025-05-29

## 2025-05-29 NOTE — TELEPHONE ENCOUNTER
HUB RELAY: lvm for pt to call back to reschedule appt on 6/13/25 due to PCP being out of the office. Advised appt would be removed from schedule. Please reschedule pt.

## 2025-06-02 ENCOUNTER — TREATMENT (OUTPATIENT)
Dept: PHYSICAL THERAPY | Facility: CLINIC | Age: 85
End: 2025-06-02
Payer: MEDICARE

## 2025-06-02 DIAGNOSIS — M54.50 LUMBAR PAIN: Primary | ICD-10-CM

## 2025-06-02 PROCEDURE — 97110 THERAPEUTIC EXERCISES: CPT | Performed by: PHYSICAL THERAPIST

## 2025-06-02 PROCEDURE — 97535 SELF CARE MNGMENT TRAINING: CPT | Performed by: PHYSICAL THERAPIST

## 2025-06-02 NOTE — PROGRESS NOTES
Physical Therapy Daily Treatment Note  [address] [facility], [address]  [place] [address]      Patient: Olu Izquierdo   : 1940  Referring practitioner: Luis Dudley MD  Date of Initial Visit: Type: THERAPY  Noted: 2025  Today's Date: 2025  Patient seen for 10 sessions       Visit Diagnoses:    ICD-10-CM ICD-9-CM   1. Lumbar pain  M54.50 724.2           Subjective   Patient reports that his right foot and lower leg are bothering him, rates the pain at 5/10.  Reports minimal pain in the back and right glut though.  Reports that the MD gave him a steriod pack which he just started Saturday.    Objective   See Exercise, Manual, and Modality Logs for complete treatment.   Added supine nerve glides.    Assessment/Plan  Subjective reports are increased from the previous visit.  Held the NuStep due to increased ankle pain.  Decreased the strengthening exercises in standing.  Added supine nerve glide to help with the symptom in the right lower leg and foot.  Patient tolerated the exercise routine without an increase in pain or parasthesias.      Timed:         Manual Therapy:    0     mins  36046;     Therapeutic Exercise:    17     mins  89415;     Neuromuscular Tremayne:    0    mins  94901;    Therapeutic Activity:     0     mins  57663;     Gait Training      0    mins  84255;  Work Conditioning     0   mins  22472   Self Care  __8___ mins 07910      Untimed:  Electrical Stimulation:    0     mins  91321 ( );      Timed Treatment:   25   mins   Total Treatment:     25   mins    Chidi Arias, PT  [place] License: [id number]

## 2025-06-04 ENCOUNTER — TREATMENT (OUTPATIENT)
Dept: PHYSICAL THERAPY | Facility: CLINIC | Age: 85
End: 2025-06-04
Payer: MEDICARE

## 2025-06-04 DIAGNOSIS — M54.50 LUMBAR PAIN: Primary | ICD-10-CM

## 2025-06-04 NOTE — PROGRESS NOTES
Physical Therapy Daily Treatment Note  [address], [address]  [place] [address]      Patient: Olu Izquierdo   : 1940  Referring practitioner: Luis Dudley MD  Date of Initial Visit: Type: THERAPY  Noted: 2025  Today's Date: 2025  Patient seen for 11 sessions       Visit Diagnoses:    ICD-10-CM ICD-9-CM   1. Lumbar pain  M54.50 724.2           Subjective   Patient reports that he feels better than the other.  Reports no pain in the back, but some pain in the ankle/leg.    Objective   See Exercise, Manual, and Modality Logs for complete treatment.       Assessment/Plan  Subjective reports are improved from the last visit.  Continued with some of the exercises that were previously held, but no new exercises were given today.  Patient performed the routine without reports of pain and was able to perform the routine better than the last visit.      Timed:         Manual Therapy:    0     mins  20840;     Therapeutic Exercise:    15     mins  30956;     Neuromuscular Tremayne:    0    mins  83448;    Therapeutic Activity:     0     mins  23987;     Gait Training      0    mins  61634;  Work Conditioning     0   mins  68893   Self Care  __8___ mins 77212      Untimed:  Electrical Stimulation:    0     mins  61250 ( );      Timed Treatment:   23   mins   Total Treatment:     29   mins    Chidi Arias, PT  [place] License: [id number]

## 2025-06-05 ENCOUNTER — TELEPHONE (OUTPATIENT)
Dept: NEUROSURGERY | Facility: CLINIC | Age: 85
End: 2025-06-05

## 2025-06-05 NOTE — TELEPHONE ENCOUNTER
Hub staff attempted to follow warm transfer process and was unsuccessful     Caller: ALFRED    Relationship to patient: SELF    Best call back number: [phone]    Patient is needing: DR PALACIOS TOLD HIM TO CALL AND ASK FOR DILLON OR RACHELLE ABOUT SOME MEDICATION HE WAS PRESCRIBED -  DID GET A LITTLE BETTER BUT STILL HAS SOME PAIN IN HIS ANKLE AND LEG - HE WAS PRESCRIBED MEDRO DOSEPAK - HAS FINISHED MEDICATION     THANK YOU

## 2025-06-06 ENCOUNTER — TELEPHONE (OUTPATIENT)
Dept: NEUROSURGERY | Facility: CLINIC | Age: 85
End: 2025-06-06
Payer: MEDICARE

## 2025-06-06 RX ORDER — DEXAMETHASONE 1.5 MG/1
TABLET ORAL
Qty: 51 TABLET | Refills: 0 | Status: SHIPPED | OUTPATIENT
Start: 2025-06-06

## 2025-06-06 NOTE — TELEPHONE ENCOUNTER
This patient called yesterday.  He is better but is still having pain after a Medrol Dosepak.  We will try him on a slightly longer course of steroids.

## 2025-06-09 ENCOUNTER — TELEPHONE (OUTPATIENT)
Dept: NEUROSURGERY | Facility: CLINIC | Age: 85
End: 2025-06-09
Payer: MEDICARE

## 2025-06-09 ENCOUNTER — TREATMENT (OUTPATIENT)
Dept: PHYSICAL THERAPY | Facility: CLINIC | Age: 85
End: 2025-06-09
Payer: MEDICARE

## 2025-06-09 DIAGNOSIS — M54.50 LUMBAR PAIN: Primary | ICD-10-CM

## 2025-06-09 PROCEDURE — 97535 SELF CARE MNGMENT TRAINING: CPT | Performed by: PHYSICAL THERAPIST

## 2025-06-09 PROCEDURE — 97110 THERAPEUTIC EXERCISES: CPT | Performed by: PHYSICAL THERAPIST

## 2025-06-09 NOTE — PROGRESS NOTES
Physical Therapy Daily Treatment Note  [address] [facility], [address]  [place] [address]      Patient: Olu Izquierdo   : 1940  Referring practitioner: Luis Dudley MD  Date of Initial Visit: Type: THERAPY  Noted: 2025  Today's Date: 2025  Patient seen for 12 sessions       Visit Diagnoses:    ICD-10-CM ICD-9-CM   1. Lumbar pain  M54.50 724.2       Subjective   Patient reports aching in the low back, but denies pain.    Objective          Active Range of Motion     Lumbar   Flexion: 112 degrees   Extension: 5 degrees       See Exercise, Manual, and Modality Logs for complete treatment.       Assessment/Plan  Subjective reports are good with regard to pain, but aching persists.  Added step ups for LE strengthening which will allow him to navigate stairs easier.  Patient tolerated the progression of strengthening exercises without visual or verbal signs of pain in the lumbar spine.  Plan to incorporate more balance activities for improved gait and less reliance on the cane.      Timed:         Manual Therapy:    0     mins  48626;     Therapeutic Exercise:    20     mins  03288;     Neuromuscular Tremayne:    0    mins  27825;    Therapeutic Activity:     0     mins  41758;     Gait Training      0    mins  58799;  Work Conditioning     0   mins  14699   Self Care  __8___ mins 96940      Untimed:  Electrical Stimulation:    0     mins  45884 ( );      Timed Treatment:   28   mins   Total Treatment:     31   mins    Chidi Arias, PT  [place] License: [id number]

## 2025-06-09 NOTE — TELEPHONE ENCOUNTER
Home health is sending a plan of care that has not been signed yet from April. They will be faxing it over this afternoon.

## 2025-06-11 ENCOUNTER — TREATMENT (OUTPATIENT)
Dept: PHYSICAL THERAPY | Facility: CLINIC | Age: 85
End: 2025-06-11
Payer: MEDICARE

## 2025-06-11 ENCOUNTER — OFFICE VISIT (OUTPATIENT)
Dept: FAMILY MEDICINE CLINIC | Facility: CLINIC | Age: 85
End: 2025-06-11
Payer: MEDICARE

## 2025-06-11 VITALS
OXYGEN SATURATION: 100 % | TEMPERATURE: 98.6 F | WEIGHT: 191.7 LBS | SYSTOLIC BLOOD PRESSURE: 112 MMHG | BODY MASS INDEX: 25.96 KG/M2 | DIASTOLIC BLOOD PRESSURE: 72 MMHG | HEIGHT: 72 IN | HEART RATE: 66 BPM | RESPIRATION RATE: 17 BRPM

## 2025-06-11 DIAGNOSIS — M48.062 SPINAL STENOSIS OF LUMBAR REGION WITH NEUROGENIC CLAUDICATION: Primary | Chronic | ICD-10-CM

## 2025-06-11 DIAGNOSIS — G62.9 NEUROPATHY: ICD-10-CM

## 2025-06-11 DIAGNOSIS — Z09 FOLLOW-UP EXAMINATION FOLLOWING SURGERY: ICD-10-CM

## 2025-06-11 DIAGNOSIS — F33.0 MILD EPISODE OF RECURRENT MAJOR DEPRESSIVE DISORDER: ICD-10-CM

## 2025-06-11 DIAGNOSIS — M54.50 LUMBAR PAIN: Primary | ICD-10-CM

## 2025-06-11 PROCEDURE — 97110 THERAPEUTIC EXERCISES: CPT | Performed by: PHYSICAL THERAPIST

## 2025-06-11 PROCEDURE — 97535 SELF CARE MNGMENT TRAINING: CPT | Performed by: PHYSICAL THERAPIST

## 2025-06-11 RX ORDER — HYDROCODONE BITARTRATE AND ACETAMINOPHEN 5; 325 MG/1; MG/1
1 TABLET ORAL EVERY 12 HOURS PRN
Qty: 12 TABLET | Refills: 0 | Status: SHIPPED | OUTPATIENT
Start: 2025-06-11

## 2025-06-11 NOTE — PROGRESS NOTES
Physical Therapy Daily Treatment Note  [address] [facility], [address]  [place] [address]      Patient: Olu Izquierdo   : 1940  Referring practitioner: Luis Dudley MD  Date of Initial Visit: Type: THERAPY  Noted: 2025  Today's Date: 2025  Patient seen for 13 sessions       Visit Diagnoses:    ICD-10-CM ICD-9-CM   1. Lumbar pain  M54.50 724.2           Subjective   Patient reports that the last 3-4 days have been good, currently rates the pain at 1-2/10.  Reports being able to put together his wife's scooter last week without any issues.    Objective          Postural Observations    Additional Postural Observation Details  Patient ambulates with a minimal antalgic gait pattern with the use of a cane.    Patient is able to go from sitting to standing 5 times using the arm rests to push up in 14 seconds.    Palpation     Additional Palpation Details  No TTP to the lumbar spine.    Active Range of Motion     Lumbar   Flexion: 118 degrees   Extension: 11 degrees   Left lateral flexion: 15 degrees   Right lateral flexion: 13 degrees     Strength/Myotome Testing     Left Hip   Planes of Motion   Flexion: 4  Abduction: 5  Adduction: 5    Right Hip   Planes of Motion   Flexion: 4+  Abduction: 5  Adduction: 5    Left Knee   Extension: 4-    Right Knee   Extension: 4    Left Ankle/Foot   Dorsiflexion: 5    Right Ankle/Foot   Dorsiflexion: 5      See Exercise, Manual, and Modality Logs for complete treatment.       Assessment & Plan       Assessment  Prognosis details: STG's to be met by 2 weeks  1)  Independent with HEP to show compliance  2)  Decrease pain by 50% or more to allow patient to perform self care and activities more comfortably  3)  Increase AROM lumbar SB 5 degrees for improved trunk mobility  4)  Patient to ambulate 50 feet without the use of the cane with a minimal antalgic gait pattern for improved ability to navigate in his house    LTG's to be met by 4 weeks  1)  Independent with  HEP progression to show continued compliance  2)  Decrease pain by 75% or more to allow patient to return to activities and hobbies with minimal limitations   3)  Increase strength for left hip flex to 4+/5 and left knee ext to 4/5 for improved stability with gait  4)  Perform SLS on each leg without the use of hand support 5-10 seconds for improved proprioception with will allow patient to ambulate more steady        Plan  Therapy options: will be seen for skilled therapy services  Planned therapy interventions: strengthening, stretching, therapeutic activities, home exercise program, neuromuscular re-education and gait training      Subjective reports are improved with regard to pain.  Improvements have been made with regard to AROM, strength and function, but deficits persist with pain, AROM strength and function.  Added tandem stance for improved proprioception which will improve stability with gait and ambulating on uneven terrain.  Patient had no reports of pain in the lumbar spine with the routine.      Timed:         Manual Therapy:    0     mins  22183;     Therapeutic Exercise:    21     mins  49445;     Neuromuscular Tremayne:    0    mins  83764;   Therapeutic Activity:     0     mins  90506;     Gait Training      0    mins  38138;  Work Conditioning     0   mins  38604   Self Care  __8___ mins 44755      Untimed:  Electrical Stimulation:    0     mins  55320 ( );      Timed Treatment:   29   mins   Total Treatment:     29   mins    Chidi Arias, PT  [place] License: [id number]

## 2025-06-11 NOTE — PROGRESS NOTES
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  Last visit added the low-dose duloxetine.  He is taking very rare hydrocodone.  Has not taken it in a couple weeks.  Over the last 5 or 6 days his radiculopathy pain in the right lower extremity is much improved.  He is doing his physical therapy.  He is tolerating the duloxetine as started at last visit.  Mood is stable.  Tolerates the Lyrica.    Objective   Vital Signs:  /72   Pulse 66   Temp 98.6 °F (37 °C) (Oral)   Resp 17   Ht 182.9 cm (72\")   Wt 87 kg (191 lb 11.2 oz)   SpO2 100%   BMI 26.00 kg/m²   Estimated body mass index is 26 kg/m² as calculated from the following:    Height as of this encounter: 182.9 cm (72\").    Weight as of this encounter: 87 kg (191 lb 11.2 oz).            Physical Exam  Constitutional:       General: He is not in acute distress.     Appearance: He is not ill-appearing.   Musculoskeletal:      Comments: No allodynia right lower extremity.  Gait mildly antalgic.   Skin:     Findings: No rash.   Psychiatric:         Mood and Affect: Mood normal.        Result Review :  The following data was reviewed by: Edenilson Henry MD on 06/11/2025:  Common labs          3/21/2025    05:18 3/22/2025    04:18 3/23/2025    06:34   Common Labs   Glucose 118  111  108    BUN 12  14  14    Creatinine 0.66  0.78  0.72    Sodium 137  136  135    Potassium 3.8  4.3  4.1    Chloride 103  101  100    Calcium 8.1  8.5  8.6    WBC 11.34  11.07  10.54    Hemoglobin 9.4  9.5  9.6    Hematocrit 28.7  29.2  28.7    Platelets 196  230  285    Uric Acid   4.2      TSH          3/11/2025    10:01   TSH   TSH 1.880                Assessment and Plan   Diagnoses and all orders for this visit:    1. 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 Follow-up examination following surgery  -     HYDROcodone-acetaminophen (NORCO) 5-325 MG per tablet; Take 1 tablet by mouth Every 12 (Twelve) Hours As Needed (post operative back pain).  Dispense: 12 tablet; Refill: 0    3. Neuropathy    4. 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  Continue duloxetine at low-dose 20 mg daily.  Continue Lyrica prescribed by me.  I have refilled his postoperative hydrocodone.  12 tablets to last more than a month.  He states he may not need them at all.  No red flag behavior.  I will see him back in 3 months for annual Medicare wellness visit and recheck.  Sooner as needed.  Duration of duloxetine, targeting 6 months.       Follow Up   Return in about 3 months (around 9/11/2025) for Subsequent Medicare Wellness Visit.  Patient was given instructions and counseling regarding his condition or for health maintenance advice. Please see specific information pulled into the AVS if appropriate.

## 2025-06-16 ENCOUNTER — TREATMENT (OUTPATIENT)
Dept: PHYSICAL THERAPY | Facility: CLINIC | Age: 85
End: 2025-06-16
Payer: MEDICARE

## 2025-06-16 DIAGNOSIS — M54.50 LUMBAR PAIN: Primary | ICD-10-CM

## 2025-06-16 PROCEDURE — 97530 THERAPEUTIC ACTIVITIES: CPT | Performed by: PHYSICAL THERAPIST

## 2025-06-16 PROCEDURE — 97110 THERAPEUTIC EXERCISES: CPT | Performed by: PHYSICAL THERAPIST

## 2025-06-16 NOTE — PROGRESS NOTES
Physical Therapy Daily Treatment Note  [facility] [place]   [address], [address]  [place] [address]  P: [phone]  F: [phone]    Patient: Olu Izquierdo   : 1940  Referring practitioner: Luis Dudley MD  Date of Initial Visit: Type: THERAPY  Noted: 2025  Today's Date: 2025  Patient seen for 14 sessions       Visit Diagnoses:    ICD-10-CM ICD-9-CM   1. Lumbar pain  M54.50 724.2         Subjective     Olu Izquierdo reports: No new complaints this visit.         Objective   See Exercise, Manual, and Modality Logs for complete treatment.       Assessment:  New core strengthening interventions added this visit, with pt voicing low subjective LBP reports with pallof press exercise. Step ups added for functional strength improvements and increased lumbar stability. Pt demonstrate balance deficits, evident by 1 UE support needed with SLS activities and will continue to benefit from skilled therapy to improve.         Plan:  Progress per Plan of Care            Timed:         Manual Therapy:         mins  78133;     Therapeutic Exercise:    15     mins  88601;     Neuromuscular Tremayne:        mins  68723;    Therapeutic Activity:     15     mins  10179;     Gait Training:           mins  25557;     Ultrasound:          mins  46265;    Ionto                                   mins  02297  Self Care                            mins  12539    Un-Timed:  Electrical Stimulation:         mins  60203 ( );  Traction          mins 47482        Timed Treatment:   30   mins   Total Treatment:     52   mins      Michele Rodriguez, Physical Therapist Assistant  [place] License #: [id number]

## 2025-06-18 ENCOUNTER — TREATMENT (OUTPATIENT)
Dept: PHYSICAL THERAPY | Facility: CLINIC | Age: 85
End: 2025-06-18
Payer: MEDICARE

## 2025-06-18 DIAGNOSIS — M54.50 LUMBAR PAIN: Primary | ICD-10-CM

## 2025-06-18 PROCEDURE — 97112 NEUROMUSCULAR REEDUCATION: CPT | Performed by: PHYSICAL THERAPIST

## 2025-06-18 PROCEDURE — 97164 PT RE-EVAL EST PLAN CARE: CPT | Performed by: PHYSICAL THERAPIST

## 2025-06-18 PROCEDURE — 97110 THERAPEUTIC EXERCISES: CPT | Performed by: PHYSICAL THERAPIST

## 2025-06-18 NOTE — PROGRESS NOTES
Re-Assessment / Re-Certification    Patient: Olu Izquierdo   : 1940  Diagnosis/ICD-10 Code:  Lumbar pain [M54.50]  Referring practitioner: Luis Dudley MD  Date of Initial Visit: Type: THERAPY  Noted: 2025  Today's Date: 2025   Patient seen for 15 session  Location of Service: [place] [address]       Visit Diagnoses:    ICD-10-CM ICD-9-CM   1. Lumbar pain  M54.50 724.2       Subjective:     Subjective Questionnaire: Oswestry: 38%  Clinical Progress: improved  Home Program Compliance: Yes  Treatment has included: therapeutic exercise, neuromuscular re-education, manual therapy, and therapeutic activity  NPRS: Worst: 4/10, Average: -2/10    Subjective   Olu Izquierdo reported today that he's 70% better today, still having issues with the ankle and foot though on the right, and weakness in the left leg    Objective   Additional Postural Observation Details  Patient ambulates with an antalgic gait pattern with the use of cane on his right side.  Trunk is shifted to the right.     Palpation      Additional Palpation Details  Minimal TTP to the left QL.     Active Range of Motion      Lumbar   Flexion: 70 degrees   Extension: 5 degrees   Left lateral flexion: 9 degrees   Right lateral flexion: 8 degrees      Strength/Myotome Testing      Left Hip   Planes of Motion   Abduction: 4+  Adduction: 4+  External rotation: 5  Internal rotation: 4+     Right Hip   Planes of Motion   Abduction: 4+  Adduction: 4+  External rotation: 5  Internal rotation: 4+     Left Knee   Extension: 4+     Right Knee   Extension: 5-     Left Ankle/Foot   Dorsiflexion: 5     Right Ankle/Foot   Dorsiflexion: 5        See Exercise, Manual, and Modality Logs for complete treatment.     Assessment/Plan  Patient demonstrated improvements in strength and activity tolerance at today's re-evaluation and is progressing well towards current goals under current treatment  plan. Tx today continue with emphasis of progressive functional strengthening, which the patient tolerated well and without complaint. VC and tactile cueing were provided with all interventions as needed during today's session. Patient's HEP was updated and progressed at the end of today's treatment with patient demonstrating good understanding Patient continues to have some limitations and impairments in the aforementioned areas and will continue to benefit from skilled PT to help patient regain functional mobility and return to PLOF by meeting all LTGs.      Progress toward previous goals: Partially Met    -    New Goals  Short-term goals (STG): -  Long-term goals (LTG): -      Recommendations: Continue as planned following plan implemented at IE, with progressions towards goals est at IE  Timeframe: 1 month  Prognosis to achieve goals: good    PT: Pedro Alvarado PT     License Number: [id number]  Electronically signed by Pedro Alvarado PT, 06/18/25, 1:08 PM EDT      Based upon review of the patient's progress and continued therapy plan, it is my medical opinion that Olu Izquierdo should continue physical therapy treatment at [facility]  [address] [place] [id number]  [phone].    Signature: __________________________________  Luis Dudley MD  PHYSICIAN: Luis Dudley MD  NPI: [id number]                                     DATE:     Timed:         Manual Therapy:    -     mins  71900;     Therapeutic Exercise:    15     mins  06396;     Neuromuscular Tremayne:    10    mins  62791;    Therapeutic Activity:     -     mins  48673;     Gait Training:           mins  09378;     Ultrasound:          mins  34664;    Ionto                                  mins   31944  Self Care                           mins   09576  Canalith Repos         mins 35455    Un-Timed:  Electrical Stimulation:          mins  60219 ( );  Dry Needling         mins  self-pay  Traction         mins 51713  Re-Evaluation    10    Mins  02232      Timed Treatment:   25   mins   Total Treatment:     40   mins      Please sign and return via fax to [email or web address] . Thank you, [facility].

## 2025-06-23 ENCOUNTER — TREATMENT (OUTPATIENT)
Dept: PHYSICAL THERAPY | Facility: CLINIC | Age: 85
End: 2025-06-23
Payer: MEDICARE

## 2025-06-23 DIAGNOSIS — M54.50 LUMBAR PAIN: Primary | ICD-10-CM

## 2025-06-23 PROCEDURE — 97112 NEUROMUSCULAR REEDUCATION: CPT | Performed by: PHYSICAL THERAPIST

## 2025-06-23 PROCEDURE — 97110 THERAPEUTIC EXERCISES: CPT | Performed by: PHYSICAL THERAPIST

## 2025-06-23 NOTE — PROGRESS NOTES
Physical Therapy Daily Treatment Note  [address] [facility], [address]  [place] [address]      Patient: Olu Izquierdo   : 1940  Referring practitioner: Luis Dudley MD  Date of Initial Visit: Type: THERAPY  Noted: 2025  Today's Date: 2025  Patient seen for 16 sessions       Visit Diagnoses:    ICD-10-CM ICD-9-CM   1. Lumbar pain  M54.50 724.2           Subjective   Patient reports that the pain in his right leg comes and goes, rates the pain at 2-3/10.  Reports being able to drive easier now.  States that he can sleep good.    Objective   See Exercise, Manual, and Modality Logs for complete treatment.       Assessment/Plan  Subjective reports of pain remain low.  Function is gradually improving as evident by his ability to sleep better, drive easier and assemble his wife's motorized scooter.  Added the blue airex for some of the standing exercises to challenge the proprioception.  Added SLS with finger support for improved balance which will allow the patient to navigate uneven terrain with improved stability.  Patient had no c/o with the routine.      Timed:         Manual Therapy:    0     mins  20309;     Therapeutic Exercise:    15     mins  03233;     Neuromuscular Tremayne:    8    mins  08228;    Therapeutic Activity:     0     mins  85330;     Gait Training      0    mins  96364;  Work Conditioning     0   mins  28551   Self Care  _0____ mins 78251      Untimed:  Electrical Stimulation:    0     mins  01022 ( );      Timed Treatment:   23   mins   Total Treatment:     39   mins    Chidi Arias, PT  [place] License: [id number]

## 2025-06-25 ENCOUNTER — TREATMENT (OUTPATIENT)
Dept: PHYSICAL THERAPY | Facility: CLINIC | Age: 85
End: 2025-06-25
Payer: MEDICARE

## 2025-06-25 DIAGNOSIS — M54.50 LUMBAR PAIN: Primary | ICD-10-CM

## 2025-06-25 NOTE — PROGRESS NOTES
Physical Therapy Daily Treatment Note  [address] [facility], [address]  [place] [address]      Patient: Olu Izquierdo   : 1940  Referring practitioner: Luis Dudley MD  Date of Initial Visit: Type: THERAPY  Noted: 2025  Today's Date: 2025  Patient seen for 17 sessions       Visit Diagnoses:    ICD-10-CM ICD-9-CM   1. Lumbar pain  M54.50 724.2       Subjective   Patient reports that the neuropathy in the feet is bothering him today.  Reports that the back is a lot better and he's met the main goal of putting together his wife's scooter when she has to go out.    Objective   See Exercise, Manual, and Modality Logs for complete treatment.       Assessment/Plan  Subjective reports are good with regard to the lumbar spine, but continues to have intermittent flare ups of the neuropathy in the feet.  Patient performed the routine with some discomfort in the feet, but no issues in the lumbar spine.  Plan to continue PT 1 more visit, then D/C to [facility] and he was agreeable with this.      Timed:         Manual Therapy:    0     mins  77361;     Therapeutic Exercise:    18     mins  57144;     Neuromuscular Tremayne:    0    mins  99484;    Therapeutic Activity:     0     mins  10067;     Gait Training      0    mins  57643;  Work Conditioning     0   mins  93306   Self Care  __8___ mins 17035    Untimed:  Electrical Stimulation:    0     mins  84199 ( );      Timed Treatment:   26   mins   Total Treatment:     30   mins    Chidi Arias, PT  [place] License: [id number]

## 2025-06-30 ENCOUNTER — TREATMENT (OUTPATIENT)
Dept: PHYSICAL THERAPY | Facility: CLINIC | Age: 85
End: 2025-06-30
Payer: MEDICARE

## 2025-06-30 ENCOUNTER — DOCUMENTATION (OUTPATIENT)
Dept: PHYSICAL THERAPY | Facility: CLINIC | Age: 85
End: 2025-06-30

## 2025-06-30 DIAGNOSIS — M54.50 LUMBAR PAIN: Primary | ICD-10-CM

## 2025-06-30 NOTE — PROGRESS NOTES
Discharge Summary  Discharge Summary from Physical Therapy Report  [address] [facility] [address]  [place] [address]    Patient Information  Olu Izquierdo  1940    Dates  PT visit: 4/23 to 6/30/25  Number of Visits: 18     Discharge Status of Patient: See last daily note.    Goals: Partially Met    Visit Diagnoses:  No diagnosis found.    Discharge Plan: Continue with current home exercise program as instructed    Comments see above.    Date of Discharge 6/30/25        Chidi Arias, PT  Physical Therapist  [place] License [id number]

## 2025-06-30 NOTE — PROGRESS NOTES
Physical Therapy Daily Treatment Note  [address] [facility], [address]  [place] [address]      Patient: Olu Izquierdo   : 1940  Referring practitioner: Luis Dudley MD  Date of Initial Visit: Type: THERAPY  Noted: 2025  Today's Date: 2025  Patient seen for 18 sessions       Visit Diagnoses:    ICD-10-CM ICD-9-CM   1. Lumbar pain  M54.50 724.2           Subjective   Patient reports soreness in the leg.  Denies any issues in the back.  Continues to have intermittent pain in the right top of foot extending up to the mid lower leg.  Reports that the back, hip, strength and stamina are better since beginning PT.    Objective   See Exercise, Manual, and Modality Logs for complete treatment.       Assessment/Plan  Subjective reports are improved with regard to the lumbar spine and strength of the LE, but pain persists in the right lower leg and foot.  The exercises were held per patient request.  Discussed the HEP moving forward and the patient understood.  Instructed the patient to reach out with any questions or concerns.  D/C to HEP at this time.      Timed:         Manual Therapy:    0     mins  08442;     Therapeutic Exercise:    6     mins  60657;     Neuromuscular Tremayne:    0    mins  60007;    Therapeutic Activity:     0     mins  51637;     Gait Training      0    mins  31167;  Work Conditioning     0   mins  43481   Self Care  __17___ mins 09304    Untimed:  Electrical Stimulation:    0     mins  69176 ( );      Timed Treatment:   23   mins   Total Treatment:     23   mins    Chidi Arias, PT  [place] License: [id number]

## 2025-07-11 NOTE — PROGRESS NOTES
Chief Complaint:   Hilda Hart is a 37year old female who presents for Hypertension follow up - patient denies headache, dizziness, chest pain or palpitations. Denies weight gain or leg swelling, has been watching salt intake and tolerating medications well. No orthopnea or PND. Past Medical History:   Diagnosis Date   â¢ Depression with anxiety    â¢ GERD (gastroesophageal reflux disease)    â¢ HTN (hypertension)    â¢ PONV (postoperative nausea and vomiting)    â¢ Wears glasses        Past Surgical History:   Procedure Laterality Date   â¢ ANKLE SURGERY     â¢ KNEE ARTHROSCOPY W/ MENISCECTOMY Left 1917       Current Outpatient Prescriptions   Medication Sig Dispense Refill   â¢ acetaminophen (TYLENOL) 500 MG tablet Take 500 mg by mouth every 6 hours as needed for Pain. â¢ IBUPROFEN PO Take 200 mg by mouth 4 times daily as needed. â¢ calcium carbonate (TUMS) 500 MG chewable tablet Chew 1 tablet by mouth as needed for Heartburn. â¢ NON FORMULARY Unknown birth control medication like Murina IUD       No current facility-administered medications for this visit. ALLERGIES:   Allergen Reactions   â¢ Hctz [Hydrochlorothiazide] Other (See Comments)     Heart racing, low potassium levels   â¢ Penicillins      Eyes swell, urticaria        Social History     Social History   â¢ Marital status:      Spouse name: N/A   â¢ Number of children: N/A   â¢ Years of education: N/A     Occupational History   â¢ Not on file. Social History Main Topics   â¢ Smoking status: Current Every Day Smoker     Packs/day: 0.50     Types: Cigarettes     Last attempt to quit: 5/5/2016   â¢ Smokeless tobacco: Never Used      Comment: encouraged to cut back pre-op   â¢ Alcohol use No      Comment: glass of wine for the holidays   â¢ Drug use: No   â¢ Sexual activity: Not on file     Other Topics Concern   â¢ Not on file     Social History Narrative    Lives with daughter.  from , son now lives with his girlfriend.  Works Subjective   Patient ID: Olu Izquierdo is a 84 y.o. male is here today for 6 week  follow-up with a new XR -spine done today.    Today patient states that he has pain in the low back that radiates to the R leg that radiates to the R foot, along with numbness in the R foot. Patient states that his pain is intermittent and increases @ night     History of Present Illness    This patient returns today.  He is still having pain in his right leg and down his right leg particularly when he walks a long distance.  Currently sitting in the office he has no pain at all.  His surgery was on March 17 of this year.    The following portions of the patient's history were reviewed and updated as appropriate: allergies, current medications, past family history, past medical history, past social history, past surgical history, and problem list.      Objective     There were no vitals filed for this visit.  There is no height or weight on file to calculate BMI.    Tobacco Use: Low Risk  (7/15/2025)    Patient History     Smoking Tobacco Use: Never     Smokeless Tobacco Use: Never     Passive Exposure: Not on file          Physical Exam    Neurological:      Mental Status: He is alert and oriented to person, place, and time.       Neurological Exam    Mental Status  Alert. Oriented to person, place, and time.            Assessment & Plan   Independent Review of Radiographic Studies:      I personally reviewed the images from the following studies.    I reviewed his x-rays which were done today.  I compared them to x-rays done on 29 May.  Both were done for postoperative purposes and the ones today have not yet been read.  Both look about the same.  There is good placement of the hardware and no evidence of hardware failure.    Medical Decision Making:      I told the patient I thought we would be wise to check an MRI.  I want to be sure there is not something going on at another level.  We will also try him on some  "at [facility] training to be a . Family History   Problem Relation Age of Onset   â¢ NEGATIVE FAMILY HX OF Mother    â¢ NEGATIVE FAMILY HX OF Father    â¢ NEGATIVE FAMILY HX OF Sister    â¢ NEGATIVE FAMILY HX OF Brother    â¢ Diabetes Maternal Grandmother    â¢ NEGATIVE FAMILY HX OF Maternal Grandfather    â¢ NEGATIVE FAMILY HX OF Paternal Grandmother    â¢ NEGATIVE FAMILY HX OF Paternal Grandfather        PHYSICAL EXAMINATION:  Visit Vitals  /84 ([place] [facility] [place]itting, Cuff Size: Regular)   Pulse 64   Temp 97.8 Â°F (36.6 Â°C) (Temporal Artery)   Ht 5' 4"" (1.626 m)   Wt 71.7 kg   LMP 01/13/2018   SpO2 99%   BMI 27.12 kg/mÂ²     Patient is well-developed, well-nourished, alert, awake in no apparent distress. Skin is warm and dry. HEENT: The sclerae are clear, conjunctivae are pink  Neck is supple, no thyromegaly, no supraclavicular lymph nodes. Lungs:  Essentially clear to auscultation, good air movements, no rales, rhonchi or wheezing. Heart:  Regular rate and rhythm, no murmurs or ectopic beats. Abdomen: Flat, soft and non-tender, no masses, hepatosplenomegaly, good bowel sounds. Extremities: Moves all extremities well. No edema, clubbing, cyanosis. ASSESSMENT:  1. Benign essential hypertension    2. Encounter for screening for lipoid disorders        PLAN:  Orders Placed This Encounter   â¢ Basic Metabolic Panel   â¢ Lipid Panel without Reflex     Patient advised to:  1. Set up appointment for fasting blood work  2. Continue with low salt healthy diet and lifestyle  3. Follow up in 6 months/ earlier as needed.     " Lodine.    Diagnoses and all orders for this visit:    1. Spinal stenosis of lumbar region with neurogenic claudication (Primary)  -     MRI Lumbar Spine With & Without Contrast; Future    Other orders  -     etodolac (Lodine) 400 MG tablet; Take 1 tablet by mouth 2 (Two) Times a Day.  Dispense: 60 tablet; Refill: 0      Return for After radiology test.

## 2025-07-15 ENCOUNTER — OFFICE VISIT (OUTPATIENT)
Dept: NEUROSURGERY | Facility: CLINIC | Age: 85
End: 2025-07-15
Payer: MEDICARE

## 2025-07-15 DIAGNOSIS — M48.062 SPINAL STENOSIS OF LUMBAR REGION WITH NEUROGENIC CLAUDICATION: Primary | Chronic | ICD-10-CM

## 2025-07-15 PROCEDURE — 99214 OFFICE O/P EST MOD 30 MIN: CPT | Performed by: NEUROLOGICAL SURGERY

## 2025-07-15 RX ORDER — ETODOLAC 400 MG/1
400 TABLET, FILM COATED ORAL 2 TIMES DAILY
Qty: 60 TABLET | Refills: 0 | Status: SHIPPED | OUTPATIENT
Start: 2025-07-15

## 2025-07-17 ENCOUNTER — HOSPITAL ENCOUNTER (OUTPATIENT)
Dept: MRI IMAGING | Facility: HOSPITAL | Age: 85
Discharge: HOME | End: 2025-07-17
Admitting: NEUROLOGICAL SURGERY
Payer: MEDICARE

## 2025-07-17 DIAGNOSIS — M48.062 SPINAL STENOSIS OF LUMBAR REGION WITH NEUROGENIC CLAUDICATION: Chronic | ICD-10-CM

## 2025-07-17 PROCEDURE — A9577 INJ MULTIHANCE: HCPCS | Performed by: NEUROLOGICAL SURGERY

## 2025-07-17 PROCEDURE — 25510000002 GADOBENATE DIMEGLUMINE 529 MG/ML SOLUTION: Performed by: NEUROLOGICAL SURGERY

## 2025-07-17 PROCEDURE — 72158 MRI LUMBAR SPINE W/O & W/DYE: CPT

## 2025-07-17 RX ADMIN — GADOBENATE DIMEGLUMINE 17 ML: 529 INJECTION, SOLUTION INTRAVENOUS at 16:33

## 2025-07-22 RX ORDER — SPIRONOLACTONE 50 MG/1
50 TABLET, FILM COATED ORAL DAILY
Qty: 90 TABLET | Refills: 1 | Status: SHIPPED | OUTPATIENT
Start: 2025-07-22

## 2025-07-22 NOTE — PROGRESS NOTES
Subjective   Patient ID: Olu Izquierdo is a 84 y.o. male is here today for follow-up with a new MRI L-spine done on 7/17/2025, and XR L-spine done on 7/15/2025    History of Present Illness    This patient returns today.  He is having pain in his right leg particularly when he walks any distance.  He has no pain when he is sitting.    The following portions of the patient's history were reviewed and updated as appropriate: allergies, current medications, past family history, past medical history, past social history, past surgical history, and problem list.      Objective     There were no vitals filed for this visit.  There is no height or weight on file to calculate BMI.    Tobacco Use: Low Risk  (7/24/2025)    Patient History     Smoking Tobacco Use: Never     Smokeless Tobacco Use: Never     Passive Exposure: Not on file          Physical Exam    Neurological:      Mental Status: He is alert and oriented to person, place, and time.       Neurological Exam    Mental Status  Alert. Oriented to person, place, and time.            Assessment & Plan   Independent Review of Radiographic Studies:      I personally reviewed the images from the following studies.    I reviewed his MRI which was done on 17 July of this year.  This shows some stenosis still at L5-S1 and also at L4-5 but that has not changed and it is not severe.  L3-4 looks good.  L2-3 looks okay as well.  The radiologist talks about some residual disc but I really do not see that.  He thought there was some pressure on the right L3 nerve but I think that is incorrect as well.  I really just do not see that.  L1-2 and T12-L1 look okay.    Medical Decision Making:      I told the patient about the imaging.  I told him that we should try an epidural block with an injection at L5-S1 just to see if that will help his pain.  The pain is much more referable to L4-5 than anything else.  He certainly does not go with L3.    Diagnoses and all orders for this  visit:    1. Spinal stenosis of lumbar region with neurogenic claudication (Primary)  -     Epidural Block      Return in about 4 weeks (around 8/21/2025).

## 2025-07-22 NOTE — TELEPHONE ENCOUNTER
Rx Refill Note  Requested Prescriptions     Pending Prescriptions Disp Refills    spironolactone (ALDACTONE) 50 MG tablet [Pharmacy Med Name: SPIRONOLACTONE 50MG TABLETS] 90 tablet 1     Sig: TAKE 1 TABLET BY MOUTH DAILY      Last office visit with prescribing clinician: 6/11/2025   Last telemedicine visit with prescribing clinician: Visit date not found   Next office visit with prescribing clinician: 9/29/2025                         Would you like a call back once the refill request has been completed: [] Yes [] No    If the office needs to give you a call back, can they leave a voicemail: [] Yes [] No    Liana Cruz MA  07/22/25, 10:52 EDT

## 2025-07-24 ENCOUNTER — OFFICE VISIT (OUTPATIENT)
Dept: NEUROSURGERY | Facility: CLINIC | Age: 85
End: 2025-07-24
Payer: MEDICARE

## 2025-07-24 DIAGNOSIS — M48.062 SPINAL STENOSIS OF LUMBAR REGION WITH NEUROGENIC CLAUDICATION: Primary | Chronic | ICD-10-CM

## 2025-07-24 PROCEDURE — 99213 OFFICE O/P EST LOW 20 MIN: CPT | Performed by: NEUROLOGICAL SURGERY

## 2025-07-29 RX ORDER — SPIRONOLACTONE 50 MG/1
50 TABLET, FILM COATED ORAL DAILY
Qty: 90 TABLET | Refills: 1 | OUTPATIENT
Start: 2025-07-29

## 2025-08-04 RX ORDER — METOPROLOL SUCCINATE 25 MG/1
25 TABLET, EXTENDED RELEASE ORAL DAILY
Qty: 90 TABLET | Refills: 1 | Status: SHIPPED | OUTPATIENT
Start: 2025-08-04

## 2025-08-08 ENCOUNTER — HOSPITAL ENCOUNTER (OUTPATIENT)
Dept: GENERAL RADIOLOGY | Facility: HOSPITAL | Age: 85
Discharge: HOME | End: 2025-08-08
Payer: MEDICARE

## 2025-08-08 ENCOUNTER — ANESTHESIA EVENT (OUTPATIENT)
Dept: PAIN MEDICINE | Facility: HOSPITAL | Age: 85
End: 2025-08-08
Payer: MEDICARE

## 2025-08-08 ENCOUNTER — HOSPITAL ENCOUNTER (OUTPATIENT)
Dept: PAIN MEDICINE | Facility: HOSPITAL | Age: 85
Discharge: HOME | End: 2025-08-08
Payer: MEDICARE

## 2025-08-08 ENCOUNTER — ANESTHESIA (OUTPATIENT)
Dept: PAIN MEDICINE | Facility: HOSPITAL | Age: 85
End: 2025-08-08
Payer: MEDICARE

## 2025-08-08 VITALS
HEART RATE: 71 BPM | RESPIRATION RATE: 16 BRPM | SYSTOLIC BLOOD PRESSURE: 119 MMHG | OXYGEN SATURATION: 97 % | TEMPERATURE: 96.9 F | DIASTOLIC BLOOD PRESSURE: 65 MMHG

## 2025-08-08 DIAGNOSIS — R52 PAIN: ICD-10-CM

## 2025-08-08 DIAGNOSIS — M51.16 INTERVERTEBRAL DISC DISORDER WITH RADICULOPATHY OF LUMBAR REGION: Primary | ICD-10-CM

## 2025-08-08 PROCEDURE — 25510000001 IOPAMIDOL 41 % SOLUTION: Performed by: ANESTHESIOLOGY

## 2025-08-08 PROCEDURE — 77003 FLUOROGUIDE FOR SPINE INJECT: CPT

## 2025-08-08 PROCEDURE — 25010000002 METHYLPREDNISOLONE PER 80 MG: Performed by: ANESTHESIOLOGY

## 2025-08-08 RX ORDER — IOPAMIDOL 408 MG/ML
10 INJECTION, SOLUTION INTRATHECAL
Status: COMPLETED | OUTPATIENT
Start: 2025-08-08 | End: 2025-08-08

## 2025-08-08 RX ORDER — LIDOCAINE HYDROCHLORIDE 10 MG/ML
1 INJECTION, SOLUTION INFILTRATION; PERINEURAL ONCE
Status: DISCONTINUED | OUTPATIENT
Start: 2025-08-08 | End: 2025-08-09 | Stop reason: HOSPADM

## 2025-08-08 RX ORDER — FENTANYL CITRATE 50 UG/ML
25 INJECTION, SOLUTION INTRAMUSCULAR; INTRAVENOUS AS NEEDED
Refills: 0 | Status: DISCONTINUED | OUTPATIENT
Start: 2025-08-08 | End: 2025-08-09 | Stop reason: HOSPADM

## 2025-08-08 RX ORDER — METHYLPREDNISOLONE ACETATE 80 MG/ML
80 INJECTION, SUSPENSION INTRA-ARTICULAR; INTRALESIONAL; INTRAMUSCULAR; SOFT TISSUE ONCE
Status: COMPLETED | OUTPATIENT
Start: 2025-08-08 | End: 2025-08-08

## 2025-08-08 RX ORDER — MIDAZOLAM HYDROCHLORIDE 1 MG/ML
1 INJECTION, SOLUTION INTRAMUSCULAR; INTRAVENOUS ONCE AS NEEDED
Status: DISCONTINUED | OUTPATIENT
Start: 2025-08-08 | End: 2025-08-09 | Stop reason: HOSPADM

## 2025-08-08 RX ADMIN — METHYLPREDNISOLONE ACETATE 80 MG: 80 INJECTION, SUSPENSION INTRA-ARTICULAR; INTRALESIONAL; INTRAMUSCULAR; SOFT TISSUE at 10:02

## 2025-08-08 RX ADMIN — IOPAMIDOL 10 ML: 408 INJECTION, SOLUTION INTRATHECAL at 10:01

## 2025-08-18 RX ORDER — ATORVASTATIN CALCIUM 10 MG/1
10 TABLET, FILM COATED ORAL DAILY
Qty: 90 TABLET | Refills: 1 | Status: SHIPPED | OUTPATIENT
Start: 2025-08-18

## 2025-08-19 DIAGNOSIS — Z09 FOLLOW-UP EXAMINATION FOLLOWING SURGERY: ICD-10-CM

## 2025-08-20 RX ORDER — HYDROCODONE BITARTRATE AND ACETAMINOPHEN 5; 325 MG/1; MG/1
1 TABLET ORAL EVERY 12 HOURS PRN
Qty: 12 TABLET | Refills: 0 | OUTPATIENT
Start: 2025-08-20

## 2025-08-20 RX ORDER — HYDROCHLOROTHIAZIDE 12.5 MG/1
12.5 TABLET ORAL DAILY
Qty: 90 TABLET | Refills: 1 | Status: SHIPPED | OUTPATIENT
Start: 2025-08-20

## 2025-08-26 ENCOUNTER — OFFICE VISIT (OUTPATIENT)
Dept: NEUROSURGERY | Facility: CLINIC | Age: 85
End: 2025-08-26
Payer: MEDICARE

## 2025-08-26 DIAGNOSIS — M48.062 SPINAL STENOSIS OF LUMBAR REGION WITH NEUROGENIC CLAUDICATION: Primary | Chronic | ICD-10-CM

## 2025-08-26 PROCEDURE — 99213 OFFICE O/P EST LOW 20 MIN: CPT | Performed by: NEUROLOGICAL SURGERY

## 2025-08-26 RX ORDER — ETODOLAC 400 MG/1
400 TABLET, FILM COATED ORAL 2 TIMES DAILY
Qty: 60 TABLET | Refills: 1 | Status: SHIPPED | OUTPATIENT
Start: 2025-08-26

## (undated) DEVICE — SMOKE EVACUATION TUBING WITH 7/8 IN TO 1/4 IN REDUCER: Brand: BUFFALO FILTER

## (undated) DEVICE — PK NEURO SPINE 40

## (undated) DEVICE — AIRLIFE™ UNIVERSAL OXYGEN NUT AND NIPPLE CONNECTION: Brand: AIRLIFE™

## (undated) DEVICE — Device

## (undated) DEVICE — SPNG GZ WOVN 4X4IN 12PLY 10/BX STRL

## (undated) DEVICE — PENCL SMOKE/EVAC MEGADYNE TELESCP 10FT

## (undated) DEVICE — PK ATS CUST W CARDIOTOMY RESEVOIR

## (undated) DEVICE — CODMAN® SURGICAL PATTIES 3/4" X 3/4" (1.91CM X 1.91CM): Brand: CODMAN®

## (undated) DEVICE — INTENDED FOR TISSUE SEPARATION, AND OTHER PROCEDURES THAT REQUIRE A SHARP SURGICAL BLADE TO PUNCTURE OR CUT.: Brand: BARD-PARKER ® CARBON RIB-BACK BLADES

## (undated) DEVICE — NDL SPINE 20G 3 1/2 YEL STRL 1P/U

## (undated) DEVICE — SPONGE,LAP,12"X12",XR,ST,5/PK,40PK/CS: Brand: MEDLINE

## (undated) DEVICE — TOOL MR8-15MH30 MR8 15CM MATCH 3MM: Brand: MIDAS REX MR8

## (undated) DEVICE — APPL CHLORAPREP HI/LITE 26ML ORNG

## (undated) DEVICE — UNDYED BRAIDED (POLYGLACTIN 910), SYNTHETIC ABSORBABLE SUTURE: Brand: COATED VICRYL

## (undated) DEVICE — 6.0MM PRECISION ROUND

## (undated) DEVICE — NEEDLE, QUINCKE, 20GX3.5": Brand: MEDLINE

## (undated) DEVICE — CONN TBG Y 5 IN 1 LF STRL

## (undated) DEVICE — TRAP FLD MINIVAC MEGADYNE 100ML

## (undated) DEVICE — BG TRANSF W/COUPLER SPK 600ML

## (undated) DEVICE — DISPOSABLE IRRIGATION BIPOLAR CORD, M1000 TYPE: Brand: KIRWAN

## (undated) DEVICE — GLV SURG BIOGEL LTX PF 7

## (undated) DEVICE — SYR CONTRL LUERLOK 10CC

## (undated) DEVICE — SYR LUERLOK 30CC

## (undated) DEVICE — GLV SURG SENSICARE W/ALOE PF LF 7.5 STRL

## (undated) DEVICE — SOL NACL 0.9PCT 100ML SGL

## (undated) DEVICE — TOOL MR8-15BA50T MR8 15CM BAL SYMTRI 5MM: Brand: MIDAS REX MR8

## (undated) DEVICE — NDL BIOP BONE MARRW JAMSHIDI 11G 101MM

## (undated) DEVICE — ANTIBACTERIAL UNDYED BRAIDED (POLYGLACTIN 910), SYNTHETIC ABSORBABLE SUTURE: Brand: COATED VICRYL

## (undated) DEVICE — DRP MICROSCP LEICA 137X381CM

## (undated) DEVICE — COVER,C-ARM,41X74: Brand: MEDLINE

## (undated) DEVICE — ADHS SKIN DERMABOND TOP ADVANCED

## (undated) DEVICE — DRP MICROSCOPE 4 BINOCULAR CV 54X150IN

## (undated) DEVICE — DRSNG WND GZ PAD BORDERED 4X8IN STRL

## (undated) DEVICE — 3M™ STERI-STRIP™ ANTIMICROBIAL SKIN CLOSURES 1/2 INCH X 4 INCHES 50/CARTON 4 CARTONS/CASE A1847: Brand: 3M™ STERI-STRIP™

## (undated) DEVICE — DRN WND JP RND W TROC SIL 10F 1/8IN

## (undated) DEVICE — SYR LUERLOK 50ML

## (undated) DEVICE — RESERVOIR,SUCTION,100CC,SILICONE: Brand: MEDLINE

## (undated) DEVICE — ADHS LIQ MASTISOL 2/3ML

## (undated) DEVICE — 1LYRTR 16FR10ML100%SIL UMS SNP: Brand: MEDLINE INDUSTRIES, INC.

## (undated) DEVICE — SYR LL TP 10ML STRL

## (undated) DEVICE — SPONGE,NEURO,.75"X.75",XR,STRL,LF,10/PK: Brand: MEDLINE

## (undated) DEVICE — APPL CHLORAPREP W/TINT 26ML ORNG